# Patient Record
Sex: MALE | Race: WHITE | NOT HISPANIC OR LATINO | Employment: OTHER | URBAN - METROPOLITAN AREA
[De-identification: names, ages, dates, MRNs, and addresses within clinical notes are randomized per-mention and may not be internally consistent; named-entity substitution may affect disease eponyms.]

---

## 2022-06-27 RX ORDER — NEOMYCIN SULFATE 500 MG/1
TABLET ORAL
COMMUNITY
Start: 2021-12-14

## 2023-07-27 ENCOUNTER — HOSPITAL ENCOUNTER (OUTPATIENT)
Dept: DATA CONVERSION | Facility: HOSPITAL | Age: 78
Discharge: HOME | End: 2023-07-28

## 2023-07-27 DIAGNOSIS — G89.29 OTHER CHRONIC PAIN: Primary | ICD-10-CM

## 2023-07-27 DIAGNOSIS — M25.552 PAIN IN LEFT HIP: ICD-10-CM

## 2023-07-27 DIAGNOSIS — Z96.642 PRESENCE OF LEFT ARTIFICIAL HIP JOINT: ICD-10-CM

## 2023-07-27 DIAGNOSIS — Z88.2 ALLERGY STATUS TO SULFONAMIDES: ICD-10-CM

## 2023-07-27 LAB
ANION GAP SERPL CALCULATED.3IONS-SCNC: 7 MMOL/L (ref 0–19)
BASOPHILS # BLD AUTO: 0.02 K/UL (ref 0–0.22)
BASOPHILS NFR BLD AUTO: 0.3 % (ref 0–1)
BUN SERPL-MCNC: 17 MG/DL (ref 8–25)
BUN/CREAT SERPL: 18.9 RATIO (ref 8–21)
CALCIUM SERPL-MCNC: 9.3 MG/DL (ref 8.5–10.4)
CHLORIDE SERPL-SCNC: 105 MMOL/L (ref 97–107)
CO2 SERPL-SCNC: 30 MMOL/L (ref 24–31)
CREAT SERPL-MCNC: 0.9 MG/DL (ref 0.4–1.6)
DEPRECATED RDW RBC AUTO: 45.1 FL (ref 37–54)
DIFFERENTIAL METHOD BLD: ABNORMAL
EOSINOPHIL # BLD AUTO: 0.09 K/UL (ref 0–0.45)
EOSINOPHIL NFR BLD: 1.5 % (ref 0–3)
ERYTHROCYTE [DISTWIDTH] IN BLOOD BY AUTOMATED COUNT: 12.6 % (ref 11.7–15)
GFR SERPL CREATININE-BSD FRML MDRD: 87 ML/MIN/1.73 M2
GLUCOSE SERPL-MCNC: 94 MG/DL (ref 65–99)
HCT VFR BLD AUTO: 44 % (ref 41–50)
HGB BLD-MCNC: 14.8 GM/DL (ref 13.5–16.5)
IMM GRANULOCYTES # BLD AUTO: 0.02 K/UL (ref 0–0.1)
LYMPHOCYTES # BLD AUTO: 1.9 K/UL (ref 1.2–3.2)
LYMPHOCYTES NFR BLD MANUAL: 31.7 % (ref 20–40)
MCH RBC QN AUTO: 32.7 PG (ref 26–34)
MCHC RBC AUTO-ENTMCNC: 33.6 % (ref 31–37)
MCV RBC AUTO: 97.1 FL (ref 80–100)
MONOCYTES # BLD AUTO: 0.78 K/UL (ref 0–0.8)
MONOCYTES NFR BLD MANUAL: 13 % (ref 0–8)
NEUTROPHILS # BLD AUTO: 3.19 K/UL
NEUTROPHILS # BLD AUTO: 3.19 K/UL (ref 1.8–7.7)
NEUTROPHILS.IMMATURE NFR BLD: 0.3 % (ref 0–1)
NEUTS SEG NFR BLD: 53.2 % (ref 50–70)
NRBC BLD-RTO: 0 /100 WBC
PLATELET # BLD AUTO: 192 K/UL (ref 150–450)
PMV BLD AUTO: 9.2 CU (ref 7–12.6)
POTASSIUM SERPL-SCNC: 4.3 MMOL/L (ref 3.4–5.1)
RBC # BLD AUTO: 4.53 M/UL (ref 4.5–5.5)
SODIUM SERPL-SCNC: 142 MMOL/L (ref 133–145)
WBC # BLD AUTO: 6 K/UL (ref 4.5–11)

## 2024-05-12 ENCOUNTER — APPOINTMENT (OUTPATIENT)
Dept: RADIOLOGY | Facility: HOSPITAL | Age: 79
End: 2024-05-12
Payer: MEDICARE

## 2024-05-12 ENCOUNTER — APPOINTMENT (OUTPATIENT)
Dept: CARDIOLOGY | Facility: HOSPITAL | Age: 79
End: 2024-05-12
Payer: MEDICARE

## 2024-05-12 ENCOUNTER — HOSPITAL ENCOUNTER (EMERGENCY)
Facility: HOSPITAL | Age: 79
Discharge: HOME | End: 2024-05-12
Attending: EMERGENCY MEDICINE
Payer: MEDICARE

## 2024-05-12 VITALS
DIASTOLIC BLOOD PRESSURE: 101 MMHG | HEART RATE: 77 BPM | HEIGHT: 68 IN | SYSTOLIC BLOOD PRESSURE: 138 MMHG | OXYGEN SATURATION: 97 % | BODY MASS INDEX: 33.31 KG/M2 | TEMPERATURE: 97.6 F | RESPIRATION RATE: 18 BRPM | WEIGHT: 219.8 LBS

## 2024-05-12 DIAGNOSIS — R11.2 NAUSEA, VOMITING AND DIARRHEA: Primary | ICD-10-CM

## 2024-05-12 DIAGNOSIS — A08.4 VIRAL GASTROENTERITIS: ICD-10-CM

## 2024-05-12 DIAGNOSIS — R19.7 NAUSEA, VOMITING AND DIARRHEA: Primary | ICD-10-CM

## 2024-05-12 LAB
ALBUMIN SERPL-MCNC: 3.4 G/DL (ref 3.5–5)
ALP BLD-CCNC: 45 U/L (ref 35–125)
ALT SERPL-CCNC: 36 U/L (ref 5–40)
ANION GAP SERPL CALC-SCNC: 14 MMOL/L
APPEARANCE UR: CLEAR
AST SERPL-CCNC: 48 U/L (ref 5–40)
BACTERIA #/AREA URNS AUTO: ABNORMAL /HPF
BASOPHILS # BLD MANUAL: 0 X10*3/UL (ref 0–0.1)
BASOPHILS NFR BLD MANUAL: 0 %
BILIRUB SERPL-MCNC: 0.6 MG/DL (ref 0.1–1.2)
BILIRUB UR STRIP.AUTO-MCNC: NEGATIVE MG/DL
BUN SERPL-MCNC: 22 MG/DL (ref 8–25)
CALCIUM SERPL-MCNC: 8.5 MG/DL (ref 8.5–10.4)
CHLORIDE SERPL-SCNC: 102 MMOL/L (ref 97–107)
CO2 SERPL-SCNC: 23 MMOL/L (ref 24–31)
COLOR UR: YELLOW
CREAT SERPL-MCNC: 1.1 MG/DL (ref 0.4–1.6)
EGFRCR SERPLBLD CKD-EPI 2021: 68 ML/MIN/1.73M*2
EOSINOPHIL # BLD MANUAL: 0 X10*3/UL (ref 0–0.4)
EOSINOPHIL NFR BLD MANUAL: 0 %
ERYTHROCYTE [DISTWIDTH] IN BLOOD BY AUTOMATED COUNT: 12.9 % (ref 11.5–14.5)
FLUAV RNA RESP QL NAA+PROBE: NOT DETECTED
FLUBV RNA RESP QL NAA+PROBE: NOT DETECTED
GLUCOSE SERPL-MCNC: 138 MG/DL (ref 65–99)
GLUCOSE UR STRIP.AUTO-MCNC: NORMAL MG/DL
HCT VFR BLD AUTO: 45.1 % (ref 41–52)
HGB BLD-MCNC: 14.9 G/DL (ref 13.5–17.5)
IMM GRANULOCYTES # BLD AUTO: 0.02 X10*3/UL (ref 0–0.5)
IMM GRANULOCYTES NFR BLD AUTO: 0.6 % (ref 0–0.9)
KETONES UR STRIP.AUTO-MCNC: ABNORMAL MG/DL
LEUKOCYTE ESTERASE UR QL STRIP.AUTO: NEGATIVE
LIPASE SERPL-CCNC: <16 U/L (ref 16–63)
LYMPHOCYTES # BLD MANUAL: 1.19 X10*3/UL (ref 0.8–3)
LYMPHOCYTES NFR BLD MANUAL: 34 %
MCH RBC QN AUTO: 31.6 PG (ref 26–34)
MCHC RBC AUTO-ENTMCNC: 33 G/DL (ref 32–36)
MCV RBC AUTO: 96 FL (ref 80–100)
METAMYELOCYTES # BLD MANUAL: 0.07 X10*3/UL
METAMYELOCYTES NFR BLD MANUAL: 2 %
MONOCYTES # BLD MANUAL: 0.39 X10*3/UL (ref 0.05–0.8)
MONOCYTES NFR BLD MANUAL: 11 %
MUCOUS THREADS #/AREA URNS AUTO: ABNORMAL /LPF
NEUTROPHILS # BLD MANUAL: 1.76 X10*3/UL (ref 1.6–5.5)
NEUTS BAND # BLD MANUAL: 1.44 X10*3/UL (ref 0–0.5)
NEUTS BAND NFR BLD MANUAL: 41 %
NEUTS SEG # BLD MANUAL: 0.32 X10*3/UL (ref 1.6–5)
NEUTS SEG NFR BLD MANUAL: 9 %
NEUTS VAC BLD QL SMEAR: PRESENT
NITRITE UR QL STRIP.AUTO: NEGATIVE
NRBC BLD-RTO: 0 /100 WBCS (ref 0–0)
PH UR STRIP.AUTO: 6.5 [PH]
PLATELET # BLD AUTO: 202 X10*3/UL (ref 150–450)
POTASSIUM SERPL-SCNC: 3.7 MMOL/L (ref 3.4–5.1)
PROT SERPL-MCNC: 6.5 G/DL (ref 5.9–7.9)
PROT UR STRIP.AUTO-MCNC: ABNORMAL MG/DL
RBC # BLD AUTO: 4.72 X10*6/UL (ref 4.5–5.9)
RBC # UR STRIP.AUTO: ABNORMAL /UL
RBC #/AREA URNS AUTO: >20 /HPF
RBC MORPH BLD: ABNORMAL
SARS-COV-2 RNA RESP QL NAA+PROBE: NOT DETECTED
SODIUM SERPL-SCNC: 139 MMOL/L (ref 133–145)
SP GR UR STRIP.AUTO: >1.05
SQUAMOUS #/AREA URNS AUTO: ABNORMAL /HPF
TOTAL CELLS COUNTED BLD: 100
TROPONIN T SERPL-MCNC: 25 NG/L
TROPONIN T SERPL-MCNC: 26 NG/L
UROBILINOGEN UR STRIP.AUTO-MCNC: NORMAL MG/DL
VARIANT LYMPHS # BLD MANUAL: 0.11 X10*3/UL (ref 0–0.3)
VARIANT LYMPHS NFR BLD: 3 %
WBC # BLD AUTO: 3.5 X10*3/UL (ref 4.4–11.3)
WBC #/AREA URNS AUTO: ABNORMAL /HPF

## 2024-05-12 PROCEDURE — 2550000001 HC RX 255 CONTRASTS: Performed by: EMERGENCY MEDICINE

## 2024-05-12 PROCEDURE — 87636 SARSCOV2 & INF A&B AMP PRB: CPT | Performed by: PHYSICIAN ASSISTANT

## 2024-05-12 PROCEDURE — 96375 TX/PRO/DX INJ NEW DRUG ADDON: CPT

## 2024-05-12 PROCEDURE — 96374 THER/PROPH/DIAG INJ IV PUSH: CPT

## 2024-05-12 PROCEDURE — 2500000004 HC RX 250 GENERAL PHARMACY W/ HCPCS (ALT 636 FOR OP/ED): Performed by: PHYSICIAN ASSISTANT

## 2024-05-12 PROCEDURE — 80053 COMPREHEN METABOLIC PANEL: CPT | Performed by: PHYSICIAN ASSISTANT

## 2024-05-12 PROCEDURE — 74177 CT ABD & PELVIS W/CONTRAST: CPT | Performed by: RADIOLOGY

## 2024-05-12 PROCEDURE — 93005 ELECTROCARDIOGRAM TRACING: CPT

## 2024-05-12 PROCEDURE — 83690 ASSAY OF LIPASE: CPT | Performed by: PHYSICIAN ASSISTANT

## 2024-05-12 PROCEDURE — 81001 URINALYSIS AUTO W/SCOPE: CPT | Performed by: PHYSICIAN ASSISTANT

## 2024-05-12 PROCEDURE — 85007 BL SMEAR W/DIFF WBC COUNT: CPT | Performed by: PHYSICIAN ASSISTANT

## 2024-05-12 PROCEDURE — 85027 COMPLETE CBC AUTOMATED: CPT | Performed by: PHYSICIAN ASSISTANT

## 2024-05-12 PROCEDURE — 36415 COLL VENOUS BLD VENIPUNCTURE: CPT | Performed by: PHYSICIAN ASSISTANT

## 2024-05-12 PROCEDURE — 87086 URINE CULTURE/COLONY COUNT: CPT | Mod: TRILAB,WESLAB | Performed by: PHYSICIAN ASSISTANT

## 2024-05-12 PROCEDURE — 74177 CT ABD & PELVIS W/CONTRAST: CPT

## 2024-05-12 PROCEDURE — 84484 ASSAY OF TROPONIN QUANT: CPT | Performed by: PHYSICIAN ASSISTANT

## 2024-05-12 PROCEDURE — 71046 X-RAY EXAM CHEST 2 VIEWS: CPT

## 2024-05-12 PROCEDURE — 99285 EMERGENCY DEPT VISIT HI MDM: CPT | Mod: 25

## 2024-05-12 PROCEDURE — 71046 X-RAY EXAM CHEST 2 VIEWS: CPT | Performed by: RADIOLOGY

## 2024-05-12 RX ORDER — ONDANSETRON 4 MG/1
4 TABLET, FILM COATED ORAL EVERY 6 HOURS
Qty: 12 TABLET | Refills: 0 | Status: SHIPPED | OUTPATIENT
Start: 2024-05-12 | End: 2024-05-21 | Stop reason: HOSPADM

## 2024-05-12 RX ORDER — KETOROLAC TROMETHAMINE 30 MG/ML
15 INJECTION, SOLUTION INTRAMUSCULAR; INTRAVENOUS ONCE
Status: COMPLETED | OUTPATIENT
Start: 2024-05-12 | End: 2024-05-12

## 2024-05-12 RX ORDER — FAMOTIDINE 10 MG/ML
20 INJECTION INTRAVENOUS ONCE
Status: COMPLETED | OUTPATIENT
Start: 2024-05-12 | End: 2024-05-12

## 2024-05-12 RX ORDER — ACETAMINOPHEN 325 MG/1
975 TABLET ORAL ONCE
Status: COMPLETED | OUTPATIENT
Start: 2024-05-12 | End: 2024-05-12

## 2024-05-12 RX ORDER — ACETAMINOPHEN 325 MG/1
650 TABLET ORAL EVERY 6 HOURS PRN
Qty: 30 TABLET | Refills: 0 | Status: SHIPPED | OUTPATIENT
Start: 2024-05-12 | End: 2024-05-22

## 2024-05-12 RX ORDER — FAMOTIDINE 20 MG/1
20 TABLET, FILM COATED ORAL DAILY
Qty: 15 TABLET | Refills: 0 | Status: SHIPPED | OUTPATIENT
Start: 2024-05-12 | End: 2024-06-10 | Stop reason: HOSPADM

## 2024-05-12 RX ORDER — ONDANSETRON HYDROCHLORIDE 2 MG/ML
4 INJECTION, SOLUTION INTRAVENOUS ONCE
Status: COMPLETED | OUTPATIENT
Start: 2024-05-12 | End: 2024-05-12

## 2024-05-12 RX ADMIN — IOHEXOL 75 ML: 350 INJECTION, SOLUTION INTRAVENOUS at 14:02

## 2024-05-12 RX ADMIN — FAMOTIDINE 20 MG: 10 INJECTION, SOLUTION INTRAVENOUS at 13:43

## 2024-05-12 RX ADMIN — ONDANSETRON 4 MG: 2 INJECTION INTRAMUSCULAR; INTRAVENOUS at 13:17

## 2024-05-12 RX ADMIN — SODIUM CHLORIDE 500 ML: 900 INJECTION, SOLUTION INTRAVENOUS at 13:18

## 2024-05-12 RX ADMIN — ACETAMINOPHEN 975 MG: 325 TABLET ORAL at 15:35

## 2024-05-12 RX ADMIN — KETOROLAC TROMETHAMINE 15 MG: 30 INJECTION, SOLUTION INTRAMUSCULAR at 13:17

## 2024-05-12 ASSESSMENT — PAIN DESCRIPTION - PAIN TYPE: TYPE: ACUTE PAIN

## 2024-05-12 ASSESSMENT — PAIN SCALES - GENERAL
PAINLEVEL_OUTOF10: 7
PAINLEVEL_OUTOF10: 0 - NO PAIN
PAINLEVEL_OUTOF10: 7
PAINLEVEL_OUTOF10: 7
PAINLEVEL_OUTOF10: 0 - NO PAIN

## 2024-05-12 ASSESSMENT — PAIN - FUNCTIONAL ASSESSMENT
PAIN_FUNCTIONAL_ASSESSMENT: 0-10

## 2024-05-12 ASSESSMENT — COLUMBIA-SUICIDE SEVERITY RATING SCALE - C-SSRS
2. HAVE YOU ACTUALLY HAD ANY THOUGHTS OF KILLING YOURSELF?: NO
6. HAVE YOU EVER DONE ANYTHING, STARTED TO DO ANYTHING, OR PREPARED TO DO ANYTHING TO END YOUR LIFE?: NO
1. IN THE PAST MONTH, HAVE YOU WISHED YOU WERE DEAD OR WISHED YOU COULD GO TO SLEEP AND NOT WAKE UP?: NO

## 2024-05-12 ASSESSMENT — PAIN DESCRIPTION - DESCRIPTORS: DESCRIPTORS: SQUEEZING

## 2024-05-12 ASSESSMENT — PAIN DESCRIPTION - LOCATION
LOCATION: ABDOMEN
LOCATION: ABDOMEN

## 2024-05-12 NOTE — ED PROVIDER NOTES
HPI   No chief complaint on file.      79-year-old male presented emergency department with a chief complaint of 1 week of nausea, vomiting, diarrhea.  Has mild  abdominal discomfort.  Does consume alcohol regularly.  Denies chest pain, shortness of breath numbness weakness.  Well-appearing nontoxic afebrile.  Denies fall or injury or trauma.  Denies recent travel or antibiotic use.  No other complaint.                          No data recorded                   Patient History   No past medical history on file.  No past surgical history on file.  No family history on file.  Social History     Tobacco Use    Smoking status: Not on file    Smokeless tobacco: Not on file   Substance Use Topics    Alcohol use: Not on file    Drug use: Not on file       Physical Exam   ED Triage Vitals [05/12/24 1304]   Temperature Heart Rate Respirations BP   36.4 °C (97.6 °F) 65 20 128/55      Pulse Ox Temp Source Heart Rate Source Patient Position   94 % Temporal Monitor Sitting      BP Location FiO2 (%)     Left arm --       Physical Exam  Vitals and nursing note reviewed.   Constitutional:       Appearance: Normal appearance.   HENT:      Head: Normocephalic.      Nose: Nose normal.      Mouth/Throat:      Mouth: Mucous membranes are moist.   Cardiovascular:      Rate and Rhythm: Normal rate and regular rhythm.   Pulmonary:      Effort: Pulmonary effort is normal.   Abdominal:      Palpations: Abdomen is soft.   Musculoskeletal:         General: Normal range of motion.      Cervical back: Normal range of motion.   Skin:     General: Skin is warm.   Neurological:      General: No focal deficit present.      Mental Status: He is alert and oriented to person, place, and time.   Psychiatric:         Mood and Affect: Mood normal.         ED Course & MDM   ED Course as of 05/12/24 2202   Sun May 12, 2024   1429 EKG on my independent interpretation: Sinus rhythm 81 bpm, normal axis, wide QRS at 1 and 58 ms with right bundle branch block  morphology, no clear acute ST or T wave abnormalities [TRAMAINE]   1500 Bands %, Manual: 41.0 []   1515 Bands %, Manual: 41.0 []      ED Course User Index  [] Burke Brice PA-C  [] June Morales MD         Diagnoses as of 05/12/24 2202   Nausea, vomiting and diarrhea   Viral gastroenteritis       Medical Decision Making  I have seen and evaluated this patient.  The attending physician has also seen and evaluated this patient.  Vital signs, laboratory testing and diagnostic images if applicable have been reviewed.  All laboratory and imaging is interpreted by myself unless otherwise stated.  Radiology studies are also formally interpreted by radiologist.    Patient is mildly leukopenic.  Not significantly anemic.  Metabolic panel without zaid renal impairment or significant electrolyte abnormality.  Patient is a 41% bandemia.  Believe this is viral in nature.  Negative CT abdomen.  Negative chest x-ray.  Overall believe patient represents a viral syndrome.  He has improvement in emergency department.  Feels comfortable plan of discharge.  Released in good condition.    Labs Reviewed  CBC WITH AUTO DIFFERENTIAL - Abnormal     WBC                           3.5 (*)                nRBC                          0.0                    RBC                           4.72                   Hemoglobin                    14.9                   Hematocrit                    45.1                   MCV                           96                     MCH                           31.6                   MCHC                          33.0                   RDW                           12.9                   Platelets                     202                    Immature Granulocytes %, Automated   0.6                    Immature Granulocytes Absolute, Au*   0.02                     Narrative: The previously reported component Neutrophils % is no longer being reported.The previously reported component Lymphocytes % is no  longer being reported.The previously reported component Monocytes % is no longer being reported.The previously                 reported component Eosinophils % is no longer being reported.The previously reported component Basophils % is no longer being reported.The previously reported component Absolute Neutrophils is no longer being reported.The previously reported                 component Absolute Lymphocytes is no longer being reported.The previously reported component Absolute Monocytes is no longer being reported.The previously reported component Absolute Eosinophils is no longer being reported.The previously reported                 component Absolute Basophils is no longer being reported.  COMPREHENSIVE METABOLIC PANEL - Abnormal     Glucose                       138 (*)                Sodium                        139                    Potassium                     3.7                    Chloride                      102                    Bicarbonate                   23 (*)                 Urea Nitrogen                 22                     Creatinine                    1.10                   eGFR                          68                     Calcium                       8.5                    Albumin                       3.4 (*)                Alkaline Phosphatase          45                     Total Protein                 6.5                    AST                           48 (*)                 Bilirubin, Total              0.6                    ALT                           36                     Anion Gap                     14                  LIPASE - Abnormal     Lipase                        <16 (*)             URINALYSIS WITH REFLEX CULTURE AND MICROSCOPIC - Abnormal     Color, Urine                  Yellow                 Appearance, Urine             Clear                  Specific Gravity, Urine       >1.050 (*)               pH, Urine                     6.5                     Protein, Urine                100 (2+) (*)               Glucose, Urine                Normal                 Blood, Urine                    (*)                  Ketones, Urine                10 (1+) (*)               Bilirubin, Urine              NEGATIVE                Urobilinogen, Urine           Normal                 Nitrite, Urine                NEGATIVE                Leukocyte Esterase, Urine     NEGATIVE             SERIAL TROPONIN, INITIAL ([facility]) - Abnormal     Troponin T, High Sensitivity   26 (*)              SERIAL TROPONIN, INITIAL ([facility]) - Abnormal     Troponin T, High Sensitivity   25 (*)              MANUAL DIFFERENTIAL - Abnormal     Neutrophils %, Manual         9.0                    Bands %, Manual               41.0                   Lymphocytes %, Manual         34.0                   Monocytes %, Manual           11.0                   Eosinophils %, Manual         0.0                    Basophils %, Manual           0.0                    Atypical Lymphocytes %, Manual   3.0                    Metamyelocytes %, Manual      2.0                    Seg Neutrophils Absolute, Manual   0.32 (*)               Bands Absolute, Manual        1.44 (*)               Lymphocytes Absolute, Manual   1.19                   Monocytes Absolute, Manual    0.39                   Eosinophils Absolute, Manual   0.00                   Basophils Absolute, Manual    0.00                   Atypical Lymphs Absolute, Manual   0.11                   Metamyelocytes Absolute, Manual   0.07                   Total Cells Counted           100                    Neutrophils Absolute, Manual   1.76                   RBC Morphology                See Below                Vacuolated Neutrophils        Present             URINALYSIS MICROSCOPIC WITH REFLEX CULTURE - Abnormal     WBC, Urine                    6-10 (*)               RBC, Urine                    >20 (*)                Squamous Epithelial Cells, Urine                           Bacteria, Urine               1+ (*)                 Mucus, Urine                  2+                  SARS-COV-2 PCR - Normal     Coronavirus 2019, PCR                                  Narrative: This assay has received FDA Emergency Use Authorization (EUA) and is only authorized for the duration of time that circumstances exist to justify the authorization of the emergency use of in vitro diagnostic tests for the detection of SARS-CoV-2 virus and/or diagnosis of COVID-19 infection under section 564(b)(1) of the Act, 21 U.S.C. 360bbb-3(b)(1). This assay is an in vitro diagnostic nucleic acid amplification test for the qualitative detection of SARS-CoV-2 from nasopharyngeal specimens and has been validated for use at [facility]. Negative results do not preclude COVID-19 infections and should not be used as the sole basis for diagnosis, treatment, or other management decisions.                  INFLUENZA A AND B PCR - Normal     Flu A Result                                         Flu B Result                                           Narrative: This assay is an in vitro diagnostic multiplex nucleic acid amplification test for the detection and discrimination of Influenza A & B from nasopharyngeal specimens, and has been validated for use at [facility]. Negative results do not preclude Influenza A/B infections, and should not be used as the sole basis for diagnosis, treatment, or other management decisions. If Influenza A/B and RSV PCR results are negative, testing for Parainfluenza virus, Adenovirus and Metapneumovirus is routinely performed for [facility] [place] [place] [facility] in[place], and is available on other patients by placing an add-on request.  URINE CULTURE  URINALYSIS WITH REFLEX CULTURE AND MICROSCOPIC       Narrative: The following orders were created for panel order Urinalysis with Reflex Culture and Microscopic.                 Procedure                               Abnormality         Status                                   ---------                               -----------         ------                                   Urinalysis with Reflex C...[[id number]]  Abnormal            Final result                             Extra Urine Gray Tube[[id number]]                                                                                     Please view results for these tests on the individual orders.  TROPONIN T SERIES, HIGH SENSITIVITY (0, 2 HR, 6 HR)       Narrative: The following orders were created for panel order Troponin T Series, High Sensitivity (0, 2HR, 6HR).                Procedure                               Abnormality         Status                                   ---------                               -----------         ------                                   Serial Troponin, Initial...[[id number]]  Abnormal            Final result                                             Please view results for these tests on the individual orders.  TROPONIN T SERIES, HIGH SENSITIVITY (0, 2 HR, 6 HR)       Narrative: The following orders were created for panel order Troponin T Series, High Sensitivity (0, 2HR, 6HR).                Procedure                               Abnormality         Status                                   ---------                               -----------         ------                                   Serial Troponin, Initial...[[id number]]  Abnormal            Final result                             Serial Troponin, 2 Hour ...[[id number]]                                                                               Please view results for these tests on the individual orders.  SERIAL TROPONIN,  2 HOUR ([facility])  XR chest 2 views   Final Result    No acute cardiopulmonary disease.          Signed by: Hollie Stout 5/12/2024 2:39 PM    Dictation workstation:   [id number]     CT abdomen pelvis w IV contrast    Final Result    Diffuse fatty infiltration of the liver.          Colonic diverticulosis without acute diverticulitis.          Mildly dilated proximal small bowel segments which are predominantly    fluid-filled. Gradual transition to less dilated small bowel without    obstruction. Findings may reflect a nonspecific enteritis.          Left renal lower pole 6 mm nonobstructing calculus. No hydronephrosis    bilaterally.                      MACRO:    None.          Signed by: Rocco Hutchins 5/12/2024 2:19 PM    Dictation workstation:   [id number]     Medications  ketorolac (Toradol) injection 15 mg (15 mg intravenous Given 5/12/24 1317)  ondansetron (Zofran) injection 4 mg (4 mg intravenous Given 5/12/24 1317)  sodium chloride 0.9 % bolus 500 mL (0 mL intravenous Stopped 5/12/24 1348)  famotidine PF (Pepcid) injection 20 mg (20 mg intravenous Given 5/12/24 1343)  iohexol (OMNIPaque) 350 mg iodine/mL solution 75 mL (75 mL intravenous Given 5/12/24 1402)  acetaminophen (Tylenol) tablet 975 mg (975 mg oral Given 5/12/24 1535)  Discharge Medication List as of 5/12/2024  4:49 PM    START taking these medications    acetaminophen (Tylenol) 325 mg tablet  Take 2 tablets (650 mg) by mouth every 6 hours if needed for mild pain (1 - 3) for up to 10 days., Starting Sun 5/12/2024, Until Wed 5/22/2024 at 2359, Print    famotidine (Pepcid) 20 mg tablet  Take 1 tablet (20 mg) by mouth once daily for 15 days., Starting Sun 5/12/2024, Until Mon 5/27/2024, Print    ondansetron (Zofran) 4 mg tablet  Take 1 tablet (4 mg) by mouth every 6 hours for 3 days., Starting Sun 5/12/2024, Until Wed 5/15/2024, Print                  Procedure  Procedures     Burke Brice PA-C  05/12/24 7669

## 2024-05-14 LAB
ATRIAL RATE: 81 BPM
BACTERIA UR CULT: NORMAL
P AXIS: 59 DEGREES
P OFFSET: 199 MS
P ONSET: 149 MS
PR INTERVAL: 148 MS
Q ONSET: 223 MS
QRS COUNT: 13 BEATS
QRS DURATION: 158 MS
QT INTERVAL: 422 MS
QTC CALCULATION(BAZETT): 490 MS
QTC FREDERICIA: 466 MS
R AXIS: 220 DEGREES
T AXIS: 28 DEGREES
T OFFSET: 434 MS
VENTRICULAR RATE: 81 BPM

## 2024-05-17 ENCOUNTER — APPOINTMENT (OUTPATIENT)
Dept: CARDIOLOGY | Facility: HOSPITAL | Age: 79
DRG: 417 | End: 2024-05-17
Payer: MEDICARE

## 2024-05-17 ENCOUNTER — HOSPITAL ENCOUNTER (EMERGENCY)
Facility: HOSPITAL | Age: 79
Discharge: OTHER INSTITUTION | DRG: 417 | End: 2024-05-18
Attending: STUDENT IN AN ORGANIZED HEALTH CARE EDUCATION/TRAINING PROGRAM
Payer: MEDICARE

## 2024-05-17 ENCOUNTER — APPOINTMENT (OUTPATIENT)
Dept: RADIOLOGY | Facility: HOSPITAL | Age: 79
DRG: 417 | End: 2024-05-17
Payer: MEDICARE

## 2024-05-17 DIAGNOSIS — R10.84 ABDOMINAL PAIN, GENERALIZED: Primary | ICD-10-CM

## 2024-05-17 DIAGNOSIS — K57.92 DIVERTICULITIS: ICD-10-CM

## 2024-05-17 LAB
ALBUMIN SERPL-MCNC: 2.6 G/DL (ref 3.5–5)
ALP BLD-CCNC: 82 U/L (ref 35–125)
ALT SERPL-CCNC: 27 U/L (ref 5–40)
ANION GAP SERPL CALC-SCNC: 14 MMOL/L
AST SERPL-CCNC: 23 U/L (ref 5–40)
BILIRUB SERPL-MCNC: 0.7 MG/DL (ref 0.1–1.2)
BUN SERPL-MCNC: 16 MG/DL (ref 8–25)
CALCIUM SERPL-MCNC: 8.3 MG/DL (ref 8.5–10.4)
CHLORIDE SERPL-SCNC: 97 MMOL/L (ref 97–107)
CO2 SERPL-SCNC: 23 MMOL/L (ref 24–31)
CREAT SERPL-MCNC: 0.8 MG/DL (ref 0.4–1.6)
EGFRCR SERPLBLD CKD-EPI 2021: 90 ML/MIN/1.73M*2
GLUCOSE SERPL-MCNC: 142 MG/DL (ref 65–99)
LIPASE SERPL-CCNC: 31 U/L (ref 16–63)
POTASSIUM SERPL-SCNC: 3.7 MMOL/L (ref 3.4–5.1)
PROT SERPL-MCNC: 5.8 G/DL (ref 5.9–7.9)
SODIUM SERPL-SCNC: 134 MMOL/L (ref 133–145)

## 2024-05-17 PROCEDURE — 99285 EMERGENCY DEPT VISIT HI MDM: CPT | Mod: 25

## 2024-05-17 PROCEDURE — 2500000004 HC RX 250 GENERAL PHARMACY W/ HCPCS (ALT 636 FOR OP/ED): Performed by: STUDENT IN AN ORGANIZED HEALTH CARE EDUCATION/TRAINING PROGRAM

## 2024-05-17 PROCEDURE — 80053 COMPREHEN METABOLIC PANEL: CPT | Performed by: STUDENT IN AN ORGANIZED HEALTH CARE EDUCATION/TRAINING PROGRAM

## 2024-05-17 PROCEDURE — 83690 ASSAY OF LIPASE: CPT | Performed by: STUDENT IN AN ORGANIZED HEALTH CARE EDUCATION/TRAINING PROGRAM

## 2024-05-17 PROCEDURE — 74177 CT ABD & PELVIS W/CONTRAST: CPT

## 2024-05-17 PROCEDURE — 2550000001 HC RX 255 CONTRASTS: Performed by: STUDENT IN AN ORGANIZED HEALTH CARE EDUCATION/TRAINING PROGRAM

## 2024-05-17 PROCEDURE — 96375 TX/PRO/DX INJ NEW DRUG ADDON: CPT

## 2024-05-17 PROCEDURE — 74177 CT ABD & PELVIS W/CONTRAST: CPT | Performed by: RADIOLOGY

## 2024-05-17 PROCEDURE — 85007 BL SMEAR W/DIFF WBC COUNT: CPT | Performed by: STUDENT IN AN ORGANIZED HEALTH CARE EDUCATION/TRAINING PROGRAM

## 2024-05-17 PROCEDURE — 85027 COMPLETE CBC AUTOMATED: CPT | Performed by: STUDENT IN AN ORGANIZED HEALTH CARE EDUCATION/TRAINING PROGRAM

## 2024-05-17 PROCEDURE — 36415 COLL VENOUS BLD VENIPUNCTURE: CPT | Performed by: STUDENT IN AN ORGANIZED HEALTH CARE EDUCATION/TRAINING PROGRAM

## 2024-05-17 PROCEDURE — 93005 ELECTROCARDIOGRAM TRACING: CPT

## 2024-05-17 RX ORDER — FAMOTIDINE 10 MG/ML
40 INJECTION INTRAVENOUS ONCE
Status: COMPLETED | OUTPATIENT
Start: 2024-05-17 | End: 2024-05-17

## 2024-05-17 RX ORDER — ONDANSETRON HYDROCHLORIDE 2 MG/ML
4 INJECTION, SOLUTION INTRAVENOUS ONCE
Status: COMPLETED | OUTPATIENT
Start: 2024-05-17 | End: 2024-05-17

## 2024-05-17 RX ORDER — SODIUM CHLORIDE 9 MG/ML
100 INJECTION, SOLUTION INTRAVENOUS CONTINUOUS
Status: CANCELLED | OUTPATIENT
Start: 2024-05-17

## 2024-05-17 RX ADMIN — IOHEXOL 75 ML: 350 INJECTION, SOLUTION INTRAVENOUS at 22:55

## 2024-05-17 RX ADMIN — ONDANSETRON 4 MG: 2 INJECTION INTRAMUSCULAR; INTRAVENOUS at 22:25

## 2024-05-17 RX ADMIN — FAMOTIDINE 40 MG: 10 INJECTION, SOLUTION INTRAVENOUS at 22:24

## 2024-05-17 ASSESSMENT — PAIN DESCRIPTION - FREQUENCY: FREQUENCY: CONSTANT/CONTINUOUS

## 2024-05-17 ASSESSMENT — PAIN - FUNCTIONAL ASSESSMENT: PAIN_FUNCTIONAL_ASSESSMENT: 0-10

## 2024-05-17 ASSESSMENT — PAIN SCALES - GENERAL: PAINLEVEL_OUTOF10: 7

## 2024-05-17 ASSESSMENT — PAIN DESCRIPTION - DESCRIPTORS: DESCRIPTORS: CRAMPING

## 2024-05-17 ASSESSMENT — PAIN DESCRIPTION - LOCATION: LOCATION: ABDOMEN

## 2024-05-17 ASSESSMENT — PAIN DESCRIPTION - ORIENTATION: ORIENTATION: LEFT

## 2024-05-18 ENCOUNTER — APPOINTMENT (OUTPATIENT)
Dept: RADIOLOGY | Facility: HOSPITAL | Age: 79
DRG: 417 | End: 2024-05-18
Payer: MEDICARE

## 2024-05-18 ENCOUNTER — HOSPITAL ENCOUNTER (INPATIENT)
Facility: HOSPITAL | Age: 79
LOS: 3 days | Discharge: NURSING, REHAB OR LONG-TERM CARE | DRG: 417 | End: 2024-05-21
Attending: STUDENT IN AN ORGANIZED HEALTH CARE EDUCATION/TRAINING PROGRAM | Admitting: STUDENT IN AN ORGANIZED HEALTH CARE EDUCATION/TRAINING PROGRAM
Payer: MEDICARE

## 2024-05-18 VITALS
TEMPERATURE: 98.4 F | WEIGHT: 240.3 LBS | SYSTOLIC BLOOD PRESSURE: 143 MMHG | RESPIRATION RATE: 15 BRPM | HEART RATE: 85 BPM | HEIGHT: 68 IN | BODY MASS INDEX: 36.42 KG/M2 | OXYGEN SATURATION: 95 % | DIASTOLIC BLOOD PRESSURE: 78 MMHG

## 2024-05-18 DIAGNOSIS — K81.0 ACUTE CHOLECYSTITIS: Primary | ICD-10-CM

## 2024-05-18 DIAGNOSIS — I50.43 CHF (CONGESTIVE HEART FAILURE), NYHA CLASS I, ACUTE ON CHRONIC, COMBINED (MULTI): ICD-10-CM

## 2024-05-18 DIAGNOSIS — I50.9 CHRONIC CONGESTIVE HEART FAILURE, UNSPECIFIED HEART FAILURE TYPE (MULTI): ICD-10-CM

## 2024-05-18 LAB
APPEARANCE UR: CLEAR
BACTERIA #/AREA URNS AUTO: ABNORMAL /HPF
BASOPHILS # BLD MANUAL: 0 X10*3/UL (ref 0–0.1)
BASOPHILS NFR BLD MANUAL: 0 %
BILIRUB UR STRIP.AUTO-MCNC: NEGATIVE MG/DL
COLOR UR: YELLOW
EOSINOPHIL # BLD MANUAL: 0 X10*3/UL (ref 0–0.4)
EOSINOPHIL NFR BLD MANUAL: 0 %
ERYTHROCYTE [DISTWIDTH] IN BLOOD BY AUTOMATED COUNT: 12.7 % (ref 11.5–14.5)
GLUCOSE UR STRIP.AUTO-MCNC: NORMAL MG/DL
HCT VFR BLD AUTO: 43.6 % (ref 41–52)
HGB BLD-MCNC: 14.7 G/DL (ref 13.5–17.5)
IMM GRANULOCYTES # BLD AUTO: 0.1 X10*3/UL (ref 0–0.5)
IMM GRANULOCYTES NFR BLD AUTO: 1.5 % (ref 0–0.9)
KETONES UR STRIP.AUTO-MCNC: ABNORMAL MG/DL
LEUKOCYTE ESTERASE UR QL STRIP.AUTO: NEGATIVE
LYMPHOCYTES # BLD MANUAL: 0.61 X10*3/UL (ref 0.8–3)
LYMPHOCYTES NFR BLD MANUAL: 9 %
MCH RBC QN AUTO: 31.7 PG (ref 26–34)
MCHC RBC AUTO-ENTMCNC: 33.7 G/DL (ref 32–36)
MCV RBC AUTO: 94 FL (ref 80–100)
MONOCYTES # BLD MANUAL: 0.82 X10*3/UL (ref 0.05–0.8)
MONOCYTES NFR BLD MANUAL: 12 %
MUCOUS THREADS #/AREA URNS AUTO: ABNORMAL /LPF
NEUTROPHILS # BLD MANUAL: 5.37 X10*3/UL (ref 1.6–5.5)
NEUTS BAND # BLD MANUAL: 2.11 X10*3/UL (ref 0–0.5)
NEUTS BAND NFR BLD MANUAL: 31 %
NEUTS SEG # BLD MANUAL: 3.26 X10*3/UL (ref 1.6–5)
NEUTS SEG NFR BLD MANUAL: 48 %
NITRITE UR QL STRIP.AUTO: NEGATIVE
NRBC BLD-RTO: 0 /100 WBCS (ref 0–0)
PH UR STRIP.AUTO: 6.5 [PH]
PLATELET # BLD AUTO: 345 X10*3/UL (ref 150–450)
PROT UR STRIP.AUTO-MCNC: ABNORMAL MG/DL
RBC # BLD AUTO: 4.63 X10*6/UL (ref 4.5–5.9)
RBC # UR STRIP.AUTO: NEGATIVE /UL
RBC #/AREA URNS AUTO: ABNORMAL /HPF
RBC MORPH BLD: ABNORMAL
SP GR UR STRIP.AUTO: >1.05
TOTAL CELLS COUNTED BLD: 100
UROBILINOGEN UR STRIP.AUTO-MCNC: ABNORMAL MG/DL
WBC # BLD AUTO: 6.8 X10*3/UL (ref 4.4–11.3)
WBC #/AREA URNS AUTO: ABNORMAL /HPF

## 2024-05-18 PROCEDURE — A9537 TC99M MEBROFENIN: HCPCS | Performed by: STUDENT IN AN ORGANIZED HEALTH CARE EDUCATION/TRAINING PROGRAM

## 2024-05-18 PROCEDURE — 2500000004 HC RX 250 GENERAL PHARMACY W/ HCPCS (ALT 636 FOR OP/ED): Performed by: STUDENT IN AN ORGANIZED HEALTH CARE EDUCATION/TRAINING PROGRAM

## 2024-05-18 PROCEDURE — 78227 HEPATOBIL SYST IMAGE W/DRUG: CPT

## 2024-05-18 PROCEDURE — 81003 URINALYSIS AUTO W/O SCOPE: CPT | Performed by: STUDENT IN AN ORGANIZED HEALTH CARE EDUCATION/TRAINING PROGRAM

## 2024-05-18 PROCEDURE — 96376 TX/PRO/DX INJ SAME DRUG ADON: CPT

## 2024-05-18 PROCEDURE — 1100000001 HC PRIVATE ROOM DAILY

## 2024-05-18 PROCEDURE — 2500000001 HC RX 250 WO HCPCS SELF ADMINISTERED DRUGS (ALT 637 FOR MEDICARE OP): Performed by: STUDENT IN AN ORGANIZED HEALTH CARE EDUCATION/TRAINING PROGRAM

## 2024-05-18 PROCEDURE — 2500000006 HC RX 250 W HCPCS SELF ADMINISTERED DRUGS (ALT 637 FOR ALL PAYERS): Mod: MUE | Performed by: STUDENT IN AN ORGANIZED HEALTH CARE EDUCATION/TRAINING PROGRAM

## 2024-05-18 PROCEDURE — 76705 ECHO EXAM OF ABDOMEN: CPT | Performed by: STUDENT IN AN ORGANIZED HEALTH CARE EDUCATION/TRAINING PROGRAM

## 2024-05-18 PROCEDURE — 76705 ECHO EXAM OF ABDOMEN: CPT

## 2024-05-18 PROCEDURE — 96365 THER/PROPH/DIAG IV INF INIT: CPT

## 2024-05-18 PROCEDURE — 99222 1ST HOSP IP/OBS MODERATE 55: CPT | Performed by: STUDENT IN AN ORGANIZED HEALTH CARE EDUCATION/TRAINING PROGRAM

## 2024-05-18 PROCEDURE — 3430000001 HC RX 343 DIAGNOSTIC RADIOPHARMACEUTICALS: Performed by: STUDENT IN AN ORGANIZED HEALTH CARE EDUCATION/TRAINING PROGRAM

## 2024-05-18 PROCEDURE — 78226 HEPATOBILIARY SYSTEM IMAGING: CPT | Performed by: NUCLEAR MEDICINE

## 2024-05-18 PROCEDURE — 96375 TX/PRO/DX INJ NEW DRUG ADDON: CPT

## 2024-05-18 PROCEDURE — 96361 HYDRATE IV INFUSION ADD-ON: CPT

## 2024-05-18 RX ORDER — SODIUM CHLORIDE, SODIUM LACTATE, POTASSIUM CHLORIDE, CALCIUM CHLORIDE 600; 310; 30; 20 MG/100ML; MG/100ML; MG/100ML; MG/100ML
75 INJECTION, SOLUTION INTRAVENOUS CONTINUOUS
Status: DISCONTINUED | OUTPATIENT
Start: 2024-05-18 | End: 2024-05-20

## 2024-05-18 RX ORDER — TIZANIDINE 2 MG/1
2 TABLET ORAL EVERY 6 HOURS PRN
Status: DISCONTINUED | OUTPATIENT
Start: 2024-05-18 | End: 2024-05-21 | Stop reason: HOSPADM

## 2024-05-18 RX ORDER — ONDANSETRON HYDROCHLORIDE 2 MG/ML
4 INJECTION, SOLUTION INTRAVENOUS EVERY 4 HOURS PRN
Status: DISCONTINUED | OUTPATIENT
Start: 2024-05-18 | End: 2024-05-21 | Stop reason: HOSPADM

## 2024-05-18 RX ORDER — ENOXAPARIN SODIUM 100 MG/ML
40 INJECTION SUBCUTANEOUS DAILY
Status: DISCONTINUED | OUTPATIENT
Start: 2024-05-19 | End: 2024-05-21 | Stop reason: HOSPADM

## 2024-05-18 RX ORDER — ALUMINUM HYDROXIDE, MAGNESIUM HYDROXIDE, AND SIMETHICONE 1200; 120; 1200 MG/30ML; MG/30ML; MG/30ML
30 SUSPENSION ORAL ONCE
Status: COMPLETED | OUTPATIENT
Start: 2024-05-18 | End: 2024-05-18

## 2024-05-18 RX ORDER — MORPHINE SULFATE 4 MG/ML
4 INJECTION, SOLUTION INTRAMUSCULAR; INTRAVENOUS ONCE
Status: COMPLETED | OUTPATIENT
Start: 2024-05-18 | End: 2024-05-18

## 2024-05-18 RX ORDER — METOPROLOL TARTRATE 25 MG/1
25 TABLET, FILM COATED ORAL DAILY
COMMUNITY

## 2024-05-18 RX ORDER — FAMOTIDINE 20 MG/1
20 TABLET, FILM COATED ORAL DAILY
Status: DISCONTINUED | OUTPATIENT
Start: 2024-05-19 | End: 2024-05-21 | Stop reason: HOSPADM

## 2024-05-18 RX ORDER — LISINOPRIL 10 MG/1
10 TABLET ORAL DAILY
COMMUNITY

## 2024-05-18 RX ORDER — KIT FOR THE PREPARATION OF TECHNETIUM TC 99M MEBROFENIN 45 MG/10ML
6 INJECTION, POWDER, LYOPHILIZED, FOR SOLUTION INTRAVENOUS
Status: COMPLETED | OUTPATIENT
Start: 2024-05-18 | End: 2024-05-18

## 2024-05-18 RX ORDER — POTASSIUM CHLORIDE 20 MEQ/1
20 TABLET, EXTENDED RELEASE ORAL DAILY
COMMUNITY

## 2024-05-18 RX ORDER — KETOROLAC TROMETHAMINE 30 MG/ML
15 INJECTION, SOLUTION INTRAMUSCULAR; INTRAVENOUS ONCE
Status: COMPLETED | OUTPATIENT
Start: 2024-05-18 | End: 2024-05-18

## 2024-05-18 RX ORDER — ACETAMINOPHEN 325 MG/1
650 TABLET ORAL EVERY 6 HOURS
Status: DISCONTINUED | OUTPATIENT
Start: 2024-05-18 | End: 2024-05-21 | Stop reason: HOSPADM

## 2024-05-18 RX ADMIN — PIPERACILLIN SODIUM AND TAZOBACTAM SODIUM 3.38 G: 3; .375 INJECTION, SOLUTION INTRAVENOUS at 23:09

## 2024-05-18 RX ADMIN — KETOROLAC TROMETHAMINE 15 MG: 30 INJECTION, SOLUTION INTRAMUSCULAR at 08:54

## 2024-05-18 RX ADMIN — ACETAMINOPHEN 650 MG: 325 TABLET ORAL at 20:26

## 2024-05-18 RX ADMIN — PIPERACILLIN SODIUM AND TAZOBACTAM SODIUM 3.38 G: 3; .375 INJECTION, SOLUTION INTRAVENOUS at 01:06

## 2024-05-18 RX ADMIN — ALUMINUM HYDROXIDE, MAGNESIUM HYDROXIDE, AND SIMETHICONE 30 ML: 1200; 120; 1200 SUSPENSION ORAL at 02:24

## 2024-05-18 RX ADMIN — MORPHINE SULFATE 4 MG: 4 INJECTION, SOLUTION INTRAMUSCULAR; INTRAVENOUS at 01:38

## 2024-05-18 RX ADMIN — TIZANIDINE 2 MG: 2 TABLET ORAL at 20:24

## 2024-05-18 RX ADMIN — SODIUM CHLORIDE 500 ML: 900 INJECTION, SOLUTION INTRAVENOUS at 08:54

## 2024-05-18 RX ADMIN — TAZOBACTAM SODIUM AND PIPERACILLIN SODIUM 3.38 G: 375; 3 INJECTION, SOLUTION INTRAVENOUS at 15:40

## 2024-05-18 RX ADMIN — ONDANSETRON 4 MG: 2 INJECTION INTRAMUSCULAR; INTRAVENOUS at 20:24

## 2024-05-18 RX ADMIN — SODIUM CHLORIDE, SODIUM LACTATE, POTASSIUM CHLORIDE, AND CALCIUM CHLORIDE 75 ML/HR: 600; 310; 30; 20 INJECTION, SOLUTION INTRAVENOUS at 20:23

## 2024-05-18 RX ADMIN — KIT FOR THE PREPARATION OF TECHNETIUM TC 99M MEBROFENIN 6 MILLICURIE: 45 INJECTION, POWDER, LYOPHILIZED, FOR SOLUTION INTRAVENOUS at 09:42

## 2024-05-18 SDOH — SOCIAL STABILITY: SOCIAL NETWORK: ARE YOU MARRIED, WIDOWED, DIVORCED, SEPARATED, NEVER MARRIED, OR LIVING WITH A PARTNER?: WIDOWED

## 2024-05-18 SDOH — ECONOMIC STABILITY: FOOD INSECURITY: WITHIN THE PAST 12 MONTHS, THE FOOD YOU BOUGHT JUST DIDN'T LAST AND YOU DIDN'T HAVE MONEY TO GET MORE.: NEVER TRUE

## 2024-05-18 SDOH — SOCIAL STABILITY: SOCIAL NETWORK: HOW OFTEN DO YOU ATTEND CHURCH OR RELIGIOUS SERVICES?: NEVER

## 2024-05-18 SDOH — ECONOMIC STABILITY: INCOME INSECURITY: HOW HARD IS IT FOR YOU TO PAY FOR THE VERY BASICS LIKE FOOD, HOUSING, MEDICAL CARE, AND HEATING?: NOT HARD AT ALL

## 2024-05-18 SDOH — ECONOMIC STABILITY: FOOD INSECURITY: WITHIN THE PAST 12 MONTHS, YOU WORRIED THAT YOUR FOOD WOULD RUN OUT BEFORE YOU GOT MONEY TO BUY MORE.: NEVER TRUE

## 2024-05-18 SDOH — SOCIAL STABILITY: SOCIAL INSECURITY: WITHIN THE LAST YEAR, HAVE YOU BEEN AFRAID OF YOUR PARTNER OR EX-PARTNER?: NO

## 2024-05-18 SDOH — SOCIAL STABILITY: SOCIAL NETWORK: HOW OFTEN DO YOU GET TOGETHER WITH FRIENDS OR RELATIVES?: ONCE A WEEK

## 2024-05-18 SDOH — SOCIAL STABILITY: SOCIAL NETWORK: HOW OFTEN DO YOU ATTENT MEETINGS OF THE CLUB OR ORGANIZATION YOU BELONG TO?: NEVER

## 2024-05-18 SDOH — SOCIAL STABILITY: SOCIAL INSECURITY
WITHIN THE LAST YEAR, HAVE TO BEEN RAPED OR FORCED TO HAVE ANY KIND OF SEXUAL ACTIVITY BY YOUR PARTNER OR EX-PARTNER?: NO

## 2024-05-18 SDOH — SOCIAL STABILITY: SOCIAL INSECURITY: WITHIN THE LAST YEAR, HAVE YOU BEEN HUMILIATED OR EMOTIONALLY ABUSED IN OTHER WAYS BY YOUR PARTNER OR EX-PARTNER?: NO

## 2024-05-18 SDOH — HEALTH STABILITY: MENTAL HEALTH
STRESS IS WHEN SOMEONE FEELS TENSE, NERVOUS, ANXIOUS, OR CAN'T SLEEP AT NIGHT BECAUSE THEIR MIND IS TROUBLED. HOW STRESSED ARE YOU?: ONLY A LITTLE

## 2024-05-18 SDOH — ECONOMIC STABILITY: TRANSPORTATION INSECURITY
IN THE PAST 12 MONTHS, HAS LACK OF TRANSPORTATION KEPT YOU FROM MEETINGS, WORK, OR FROM GETTING THINGS NEEDED FOR DAILY LIVING?: NO

## 2024-05-18 SDOH — ECONOMIC STABILITY: TRANSPORTATION INSECURITY
IN THE PAST 12 MONTHS, HAS THE LACK OF TRANSPORTATION KEPT YOU FROM MEDICAL APPOINTMENTS OR FROM GETTING MEDICATIONS?: NO

## 2024-05-18 SDOH — SOCIAL STABILITY: SOCIAL NETWORK
DO YOU BELONG TO ANY CLUBS OR ORGANIZATIONS SUCH AS CHURCH GROUPS UNIONS, FRATERNAL OR ATHLETIC GROUPS, OR SCHOOL GROUPS?: NO

## 2024-05-18 SDOH — ECONOMIC STABILITY: INCOME INSECURITY: IN THE PAST 12 MONTHS, HAS THE ELECTRIC, GAS, OIL, OR WATER COMPANY THREATENED TO SHUT OFF SERVICE IN YOUR HOME?: NO

## 2024-05-18 SDOH — ECONOMIC STABILITY: INCOME INSECURITY: IN THE LAST 12 MONTHS, WAS THERE A TIME WHEN YOU WERE NOT ABLE TO PAY THE MORTGAGE OR RENT ON TIME?: NO

## 2024-05-18 SDOH — SOCIAL STABILITY: SOCIAL INSECURITY
WITHIN THE LAST YEAR, HAVE YOU BEEN KICKED, HIT, SLAPPED, OR OTHERWISE PHYSICALLY HURT BY YOUR PARTNER OR EX-PARTNER?: NO

## 2024-05-18 SDOH — HEALTH STABILITY: MENTAL HEALTH
HOW OFTEN DO YOU NEED TO HAVE SOMEONE HELP YOU WHEN YOU READ INSTRUCTIONS, PAMPHLETS, OR OTHER WRITTEN MATERIAL FROM YOUR DOCTOR OR PHARMACY?: NEVER

## 2024-05-18 SDOH — SOCIAL STABILITY: SOCIAL NETWORK: IN A TYPICAL WEEK, HOW MANY TIMES DO YOU TALK ON THE PHONE WITH FAMILY, FRIENDS, OR NEIGHBORS?: ONCE A WEEK

## 2024-05-18 SDOH — HEALTH STABILITY: PHYSICAL HEALTH: ON AVERAGE, HOW MANY MINUTES DO YOU ENGAGE IN EXERCISE AT THIS LEVEL?: 0 MIN

## 2024-05-18 SDOH — SOCIAL STABILITY: SOCIAL INSECURITY: HAVE YOU HAD THOUGHTS OF HARMING ANYONE ELSE?: NO

## 2024-05-18 SDOH — HEALTH STABILITY: PHYSICAL HEALTH: ON AVERAGE, HOW MANY DAYS PER WEEK DO YOU ENGAGE IN MODERATE TO STRENUOUS EXERCISE (LIKE A BRISK WALK)?: 0 DAYS

## 2024-05-18 SDOH — HEALTH STABILITY: MENTAL HEALTH: EXPERIENCED ANY OF THE FOLLOWING LIFE EVENTS: DEATH OF FAMILY/FRIEND

## 2024-05-18 ASSESSMENT — COGNITIVE AND FUNCTIONAL STATUS - GENERAL
CLIMB 3 TO 5 STEPS WITH RAILING: A LITTLE
DAILY ACTIVITIY SCORE: 19
MOVING TO AND FROM BED TO CHAIR: A LITTLE
DRESSING REGULAR LOWER BODY CLOTHING: A LITTLE
STANDING UP FROM CHAIR USING ARMS: A LITTLE
WALKING IN HOSPITAL ROOM: A LITTLE
MOBILITY SCORE: 20
PERSONAL GROOMING: A LITTLE
PATIENT BASELINE BEDBOUND: NO
TOILETING: A LITTLE
HELP NEEDED FOR BATHING: A LITTLE
DRESSING REGULAR UPPER BODY CLOTHING: A LITTLE

## 2024-05-18 ASSESSMENT — ENCOUNTER SYMPTOMS
FACIAL ASYMMETRY: 0
VOMITING: 0
FEVER: 0
DYSURIA: 0
CHILLS: 0
ABDOMINAL PAIN: 1
VOICE CHANGE: 0
ARTHRALGIAS: 0
HEADACHES: 0
HEMATURIA: 0
TROUBLE SWALLOWING: 0
ADENOPATHY: 0
BRUISES/BLEEDS EASILY: 0
SHORTNESS OF BREATH: 0
ABDOMINAL DISTENTION: 1
SORE THROAT: 0
SPEECH DIFFICULTY: 0
DIARRHEA: 1
BLOOD IN STOOL: 0
WOUND: 0
UNEXPECTED WEIGHT CHANGE: 0
NAUSEA: 0
CHEST TIGHTNESS: 0
PALPITATIONS: 0

## 2024-05-18 ASSESSMENT — ACTIVITIES OF DAILY LIVING (ADL)
BATHING: INDEPENDENT
TOILETING: INDEPENDENT
DRESSING YOURSELF: INDEPENDENT
ADEQUATE_TO_COMPLETE_ADL: YES
WALKS IN HOME: NEEDS ASSISTANCE
HEARING - RIGHT EAR: FUNCTIONAL
GROOMING: INDEPENDENT
JUDGMENT_ADEQUATE_SAFELY_COMPLETE_DAILY_ACTIVITIES: YES
PATIENT'S MEMORY ADEQUATE TO SAFELY COMPLETE DAILY ACTIVITIES?: YES
HEARING - LEFT EAR: FUNCTIONAL
FEEDING YOURSELF: INDEPENDENT

## 2024-05-18 ASSESSMENT — LIFESTYLE VARIABLES
AUDIT-C TOTAL SCORE: 0
HOW OFTEN DURING THE LAST YEAR HAVE YOU HAD A FEELING OF GUILT OR REMORSE AFTER DRINKING: WEEKLY
AUDIT TOTAL SCORE: 10
SKIP TO QUESTIONS 9-10: 0
SUBSTANCE_ABUSE_PAST_12_MONTHS: NO
PRESCIPTION_ABUSE_PAST_12_MONTHS: NO
AUDIT-C TOTAL SCORE: 10
HOW OFTEN DO YOU HAVE A DRINK CONTAINING ALCOHOL: NEVER
HOW MANY STANDARD DRINKS CONTAINING ALCOHOL DO YOU HAVE ON A TYPICAL DAY: 5 OR 6
AUDIT-C TOTAL SCORE: 0
HAVE YOU OR SOMEONE ELSE BEEN INJURED AS A RESULT OF YOUR DRINKING: NO
HOW OFTEN DURING THE LAST YEAR HAVE YOU FAILED TO DO WHAT WAS NORMALLY EXPECTED FROM YOU BECAUSE OF DRINKING: MONTHLY
HOW OFTEN DO YOU HAVE A DRINK CONTAINING ALCOHOL: 4 OR MORE TIMES A WEEK
AUDIT TOTAL SCORE: 20
HOW OFTEN DURING THE LAST YEAR HAVE YOU BEEN UNABLE TO REMEMBER WHAT HAPPENED THE NIGHT BEFORE BECAUSE YOU HAD BEEN DRINKING: MONTHLY
HOW OFTEN DURING THE LAST YEAR HAVE YOU NEEDED AN ALCOHOLIC DRINK FIRST THING IN THE MORNING TO GET YOURSELF GOING AFTER A NIGHT OF HEAVY DRINKING: WEEKLY
HOW OFTEN DO YOU HAVE 6 OR MORE DRINKS ON ONE OCCASION: DAILY OR ALMOST DAILY
SKIP TO QUESTIONS 9-10: 1
HAS A RELATIVE, FRIEND, DOCTOR, OR ANOTHER HEALTH PROFESSIONAL EXPRESSED CONCERN ABOUT YOUR DRINKING OR SUGGESTED YOU CUT DOWN: NO
HOW MANY STANDARD DRINKS CONTAINING ALCOHOL DO YOU HAVE ON A TYPICAL DAY: PATIENT DOES NOT DRINK
AUDIT-C TOTAL SCORE: 10
HOW OFTEN DO YOU HAVE 6 OR MORE DRINKS ON ONE OCCASION: NEVER
HOW OFTEN DURING THE LAST YEAR HAVE YOU FOUND THAT YOU WERE NOT ABLE TO STOP DRINKING ONCE YOU HAD STARTED: NEVER

## 2024-05-18 ASSESSMENT — PAIN SCALES - GENERAL
PAINLEVEL_OUTOF10: 4
PAINLEVEL_OUTOF10: 2
PAINLEVEL_OUTOF10: 2
PAINLEVEL_OUTOF10: 7

## 2024-05-18 ASSESSMENT — PATIENT HEALTH QUESTIONNAIRE - PHQ9
2. FEELING DOWN, DEPRESSED OR HOPELESS: SEVERAL DAYS
SUM OF ALL RESPONSES TO PHQ9 QUESTIONS 1 & 2: 2
1. LITTLE INTEREST OR PLEASURE IN DOING THINGS: SEVERAL DAYS

## 2024-05-18 ASSESSMENT — PAIN - FUNCTIONAL ASSESSMENT
PAIN_FUNCTIONAL_ASSESSMENT: 0-10
PAIN_FUNCTIONAL_ASSESSMENT: 0-10

## 2024-05-18 ASSESSMENT — COLUMBIA-SUICIDE SEVERITY RATING SCALE - C-SSRS
1. IN THE PAST MONTH, HAVE YOU WISHED YOU WERE DEAD OR WISHED YOU COULD GO TO SLEEP AND NOT WAKE UP?: NO
6. HAVE YOU EVER DONE ANYTHING, STARTED TO DO ANYTHING, OR PREPARED TO DO ANYTHING TO END YOUR LIFE?: NO
2. HAVE YOU ACTUALLY HAD ANY THOUGHTS OF KILLING YOURSELF?: NO

## 2024-05-18 NOTE — PROGRESS NOTES
Patient received in sign out from Dr Paige. Patient with abdominal pain pending ultrasound.    Ultrasound equivocal as well.  Dr. Multani recommends HIDA scan.  This is positive for acute cholecystitis.  Patient accepted to surgery service.  [facility] [place].  Parts of this chart were completed with dictation software, please excuse any errors in transcription.

## 2024-05-18 NOTE — ED PROVIDER NOTES
HPI   No chief complaint on file.      This is a 79-year-old male with a past medical history of GERD presenting to the ED for evaluation of abdominal pain, bloating, nausea and vomiting.  He was seen in the emergency department 5 days ago with similar symptoms, had a full workup, and was sent home with medication for treatment.  He returns today with the same symptoms which are persistent.  He complains of worsening left lower quadrant abdominal pain as well.  He denies any chest pain or cough or congestion, fevers or chills, shortness of breath.      History provided by:  Patient and medical records   used: No                        No data recorded                   Patient History   Past Medical History:   Diagnosis Date    CHF (congestive heart failure) (Multi)     Hypertension      No past surgical history on file.  No family history on file.  Social History     Tobacco Use    Smoking status: Never    Smokeless tobacco: Never   Vaping Use    Vaping status: Never Used   Substance Use Topics    Alcohol use: Yes     Alcohol/week: 14.0 standard drinks of alcohol     Types: 14 Cans of beer per week    Drug use: Never       Physical Exam   ED Triage Vitals   Temperature Heart Rate Respirations BP   05/17/24 2107 05/17/24 2108 05/17/24 2108 05/17/24 2108   36.5 °C (97.7 °F) 98 18 123/73      Pulse Ox Temp Source Heart Rate Source Patient Position   05/17/24 2108 05/17/24 2108 05/17/24 2108 --   94 % Oral Monitor       BP Location FiO2 (%)     05/17/24 2108 --     Right arm        Physical Exam  General: well developed, well nourished 79-year-old male who is awake alert, he Is present, otherwise in no acute distress  Eyes: sclera clear bilaterally, PERRL, EOMI  HENT: normocephalic, atraumatic.   CV: regular rate and rhythm, no murmur, no gallops, or rubs.   Resp: clear to ascultation bilaterally, no wheezes, rales, or rhonchi  GI: abdomen soft, tender to palpation in the left lower quadrant without  rigidity or guarding, no peritoneal signs, abdomen is nondistended, no masses palpated  MSK: no swelling of the extremities.  Psych: appropriate mood and affect, cooperative with exam  Skin: warm, dry, without evidence of rash or abrasions    ED Course & MDM   ED Course as of 05/18/24 0242   Sat May 18, 2024   0026 EKG shows sinus tachycardia with PVCs present.  Rate is 106 beats minute.  Right bundle branch block morphology is present.  There is no ST elevation or depression, no acute ischemic pattern.  No STEMI.  QTc is 571 ms, MS interval 142.  Morphology unchanged compared with EKG from 5 days ago. [NT]   0212 I spoke with the general surgeon Dr. Multani over the phone who is requesting an ultrasound of the right upper quadrant.  She is not highly suspicious of acute cholecystitis given lack of any leukocytosis or gallstones on CT imaging.  She wants to guide her next steps based on ultrasound imaging.  Patient will stay in the ED until ultrasound is available in the morning to have this done, further disposition will be decided afterwards. [NT]      ED Course User Index  [NT] Oliver Paige DO         Diagnoses as of 05/18/24 0242   Abdominal pain, generalized   Diverticulitis       Medical Decision Making  PMH: GERD  History obtained: directly from patient, review of recent ED visit results  Social factors affecting disposition: none    He is in no acute distress in the ED, he does have ongoing hiccups here.  He complains of left lower quadrant Johnson pain which is tender to palpation on exam, no peritoneal signs present.  I reviewed his workup from his last ED visit as well as a CT scan which showed nonspecific enteritis.  Due to worsening symptoms and now focal tenderness on exam will repeat scan and labs, treat with Pepcid, Zofran.    Labs show no leukocytosis, normal LFTs and lipase.  CT imaging shows interval development of uncomplicated diverticulitis consistent with his left lower quadrant pain.   Also shows a distended gallbladder with pericholecystic fluid, radiologist notes high concern for acute cholecystitis.  The patient does have tenderness to palpation in the right upper quadrant.  I am unable to get an ultrasound at this time of night to confirm this.  Surgical consult placed.    General surgeon Dr. Multani recommends ultrasound given the lack of gallstones or leukocytosis on labs to determine if he does have acute cholecystitis or not.  She feels the fluid is more likely due to his enteritis.  If this is the case he can likely be discharged on antibiotics and treated for his diverticulitis, if he does have cholecystitis he will need to be transferred for surgery.    Care of the patient signed out to the oncoming physician pending ultrasound results in the morning.    Procedure  Procedures     Oliver Paige DO  05/18/24 0242

## 2024-05-19 ENCOUNTER — APPOINTMENT (OUTPATIENT)
Dept: RADIOLOGY | Facility: HOSPITAL | Age: 79
DRG: 417 | End: 2024-05-19
Payer: MEDICARE

## 2024-05-19 ENCOUNTER — ANESTHESIA (OUTPATIENT)
Dept: OPERATING ROOM | Facility: HOSPITAL | Age: 79
DRG: 417 | End: 2024-05-19
Payer: MEDICARE

## 2024-05-19 ENCOUNTER — ANESTHESIA EVENT (OUTPATIENT)
Dept: OPERATING ROOM | Facility: HOSPITAL | Age: 79
DRG: 417 | End: 2024-05-19
Payer: MEDICARE

## 2024-05-19 LAB
ALBUMIN SERPL-MCNC: 2 G/DL (ref 3.5–5)
ALP BLD-CCNC: 125 U/L (ref 35–125)
ALT SERPL-CCNC: 36 U/L (ref 5–40)
ANION GAP SERPL CALC-SCNC: 14 MMOL/L
AST SERPL-CCNC: 37 U/L (ref 5–40)
BILIRUB SERPL-MCNC: 0.7 MG/DL (ref 0.1–1.2)
BUN SERPL-MCNC: 25 MG/DL (ref 8–25)
CALCIUM SERPL-MCNC: 7.7 MG/DL (ref 8.5–10.4)
CHLORIDE SERPL-SCNC: 98 MMOL/L (ref 97–107)
CO2 SERPL-SCNC: 22 MMOL/L (ref 24–31)
CREAT SERPL-MCNC: 1.2 MG/DL (ref 0.4–1.6)
EGFRCR SERPLBLD CKD-EPI 2021: 62 ML/MIN/1.73M*2
ERYTHROCYTE [DISTWIDTH] IN BLOOD BY AUTOMATED COUNT: 12.7 % (ref 11.5–14.5)
GLUCOSE BLD MANUAL STRIP-MCNC: 119 MG/DL (ref 74–99)
GLUCOSE SERPL-MCNC: 130 MG/DL (ref 65–99)
HCT VFR BLD AUTO: 37.2 % (ref 41–52)
HGB BLD-MCNC: 12.3 G/DL (ref 13.5–17.5)
LACTATE BLDV-SCNC: 1 MMOL/L (ref 0.4–2)
MCH RBC QN AUTO: 31.7 PG (ref 26–34)
MCHC RBC AUTO-ENTMCNC: 33.1 G/DL (ref 32–36)
MCV RBC AUTO: 96 FL (ref 80–100)
NRBC BLD-RTO: 0 /100 WBCS (ref 0–0)
PLATELET # BLD AUTO: 273 X10*3/UL (ref 150–450)
POTASSIUM SERPL-SCNC: 4.2 MMOL/L (ref 3.4–5.1)
PROT SERPL-MCNC: 5.1 G/DL (ref 5.9–7.9)
RBC # BLD AUTO: 3.88 X10*6/UL (ref 4.5–5.9)
SODIUM SERPL-SCNC: 134 MMOL/L (ref 133–145)
TROPONIN T SERPL-MCNC: 20 NG/L
TROPONIN T SERPL-MCNC: 25 NG/L
WBC # BLD AUTO: 7.2 X10*3/UL (ref 4.4–11.3)

## 2024-05-19 PROCEDURE — 47562 LAPAROSCOPIC CHOLECYSTECTOMY: CPT | Performed by: STUDENT IN AN ORGANIZED HEALTH CARE EDUCATION/TRAINING PROGRAM

## 2024-05-19 PROCEDURE — 2720000007 HC OR 272 NO HCPCS: Performed by: STUDENT IN AN ORGANIZED HEALTH CARE EDUCATION/TRAINING PROGRAM

## 2024-05-19 PROCEDURE — 2500000005 HC RX 250 GENERAL PHARMACY W/O HCPCS: Performed by: STUDENT IN AN ORGANIZED HEALTH CARE EDUCATION/TRAINING PROGRAM

## 2024-05-19 PROCEDURE — 71260 CT THORAX DX C+: CPT | Performed by: STUDENT IN AN ORGANIZED HEALTH CARE EDUCATION/TRAINING PROGRAM

## 2024-05-19 PROCEDURE — 84484 ASSAY OF TROPONIN QUANT: CPT | Performed by: INTERNAL MEDICINE

## 2024-05-19 PROCEDURE — 3700000001 HC GENERAL ANESTHESIA TIME - INITIAL BASE CHARGE: Performed by: STUDENT IN AN ORGANIZED HEALTH CARE EDUCATION/TRAINING PROGRAM

## 2024-05-19 PROCEDURE — 83605 ASSAY OF LACTIC ACID: CPT | Performed by: INTERNAL MEDICINE

## 2024-05-19 PROCEDURE — 51702 INSERT TEMP BLADDER CATH: CPT

## 2024-05-19 PROCEDURE — 2500000004 HC RX 250 GENERAL PHARMACY W/ HCPCS (ALT 636 FOR OP/ED): Performed by: INTERNAL MEDICINE

## 2024-05-19 PROCEDURE — 2500000001 HC RX 250 WO HCPCS SELF ADMINISTERED DRUGS (ALT 637 FOR MEDICARE OP): Performed by: STUDENT IN AN ORGANIZED HEALTH CARE EDUCATION/TRAINING PROGRAM

## 2024-05-19 PROCEDURE — 2500000004 HC RX 250 GENERAL PHARMACY W/ HCPCS (ALT 636 FOR OP/ED): Performed by: STUDENT IN AN ORGANIZED HEALTH CARE EDUCATION/TRAINING PROGRAM

## 2024-05-19 PROCEDURE — 2500000005 HC RX 250 GENERAL PHARMACY W/O HCPCS: Performed by: NURSE ANESTHETIST, CERTIFIED REGISTERED

## 2024-05-19 PROCEDURE — 99232 SBSQ HOSP IP/OBS MODERATE 35: CPT | Performed by: STUDENT IN AN ORGANIZED HEALTH CARE EDUCATION/TRAINING PROGRAM

## 2024-05-19 PROCEDURE — 2780000003 HC OR 278 NO HCPCS: Performed by: STUDENT IN AN ORGANIZED HEALTH CARE EDUCATION/TRAINING PROGRAM

## 2024-05-19 PROCEDURE — 74177 CT ABD & PELVIS W/CONTRAST: CPT

## 2024-05-19 PROCEDURE — A47562 PR LAP,CHOLECYSTECTOMY: Performed by: NURSE ANESTHETIST, CERTIFIED REGISTERED

## 2024-05-19 PROCEDURE — 2500000002 HC RX 250 W HCPCS SELF ADMINISTERED DRUGS (ALT 637 FOR MEDICARE OP, ALT 636 FOR OP/ED): Performed by: STUDENT IN AN ORGANIZED HEALTH CARE EDUCATION/TRAINING PROGRAM

## 2024-05-19 PROCEDURE — 2500000004 HC RX 250 GENERAL PHARMACY W/ HCPCS (ALT 636 FOR OP/ED): Performed by: NURSE ANESTHETIST, CERTIFIED REGISTERED

## 2024-05-19 PROCEDURE — 7100000001 HC RECOVERY ROOM TIME - INITIAL BASE CHARGE: Performed by: STUDENT IN AN ORGANIZED HEALTH CARE EDUCATION/TRAINING PROGRAM

## 2024-05-19 PROCEDURE — 74177 CT ABD & PELVIS W/CONTRAST: CPT | Performed by: STUDENT IN AN ORGANIZED HEALTH CARE EDUCATION/TRAINING PROGRAM

## 2024-05-19 PROCEDURE — 2500000004 HC RX 250 GENERAL PHARMACY W/ HCPCS (ALT 636 FOR OP/ED): Performed by: ANESTHESIOLOGY

## 2024-05-19 PROCEDURE — 84484 ASSAY OF TROPONIN QUANT: CPT | Mod: 91 | Performed by: INTERNAL MEDICINE

## 2024-05-19 PROCEDURE — 71045 X-RAY EXAM CHEST 1 VIEW: CPT | Performed by: STUDENT IN AN ORGANIZED HEALTH CARE EDUCATION/TRAINING PROGRAM

## 2024-05-19 PROCEDURE — 3600000008 HC OR TIME - EACH INCREMENTAL 1 MINUTE - PROCEDURE LEVEL THREE: Performed by: STUDENT IN AN ORGANIZED HEALTH CARE EDUCATION/TRAINING PROGRAM

## 2024-05-19 PROCEDURE — 99100 ANES PT EXTEME AGE<1 YR&>70: CPT | Performed by: ANESTHESIOLOGY

## 2024-05-19 PROCEDURE — 36415 COLL VENOUS BLD VENIPUNCTURE: CPT | Performed by: STUDENT IN AN ORGANIZED HEALTH CARE EDUCATION/TRAINING PROGRAM

## 2024-05-19 PROCEDURE — 80053 COMPREHEN METABOLIC PANEL: CPT | Performed by: STUDENT IN AN ORGANIZED HEALTH CARE EDUCATION/TRAINING PROGRAM

## 2024-05-19 PROCEDURE — 94640 AIRWAY INHALATION TREATMENT: CPT

## 2024-05-19 PROCEDURE — 74018 RADEX ABDOMEN 1 VIEW: CPT

## 2024-05-19 PROCEDURE — A47562 PR LAP,CHOLECYSTECTOMY: Performed by: ANESTHESIOLOGY

## 2024-05-19 PROCEDURE — 88304 TISSUE EXAM BY PATHOLOGIST: CPT | Mod: TC | Performed by: STUDENT IN AN ORGANIZED HEALTH CARE EDUCATION/TRAINING PROGRAM

## 2024-05-19 PROCEDURE — 7100000002 HC RECOVERY ROOM TIME - EACH INCREMENTAL 1 MINUTE: Performed by: STUDENT IN AN ORGANIZED HEALTH CARE EDUCATION/TRAINING PROGRAM

## 2024-05-19 PROCEDURE — 2550000001 HC RX 255 CONTRASTS: Performed by: STUDENT IN AN ORGANIZED HEALTH CARE EDUCATION/TRAINING PROGRAM

## 2024-05-19 PROCEDURE — 0FT44ZZ RESECTION OF GALLBLADDER, PERCUTANEOUS ENDOSCOPIC APPROACH: ICD-10-PCS | Performed by: STUDENT IN AN ORGANIZED HEALTH CARE EDUCATION/TRAINING PROGRAM

## 2024-05-19 PROCEDURE — A4550 SURGICAL TRAYS: HCPCS | Performed by: STUDENT IN AN ORGANIZED HEALTH CARE EDUCATION/TRAINING PROGRAM

## 2024-05-19 PROCEDURE — 3600000003 HC OR TIME - INITIAL BASE CHARGE - PROCEDURE LEVEL THREE: Performed by: STUDENT IN AN ORGANIZED HEALTH CARE EDUCATION/TRAINING PROGRAM

## 2024-05-19 PROCEDURE — 3700000002 HC GENERAL ANESTHESIA TIME - EACH INCREMENTAL 1 MINUTE: Performed by: STUDENT IN AN ORGANIZED HEALTH CARE EDUCATION/TRAINING PROGRAM

## 2024-05-19 PROCEDURE — 82947 ASSAY GLUCOSE BLOOD QUANT: CPT

## 2024-05-19 PROCEDURE — 71045 X-RAY EXAM CHEST 1 VIEW: CPT

## 2024-05-19 PROCEDURE — 36415 COLL VENOUS BLD VENIPUNCTURE: CPT | Performed by: INTERNAL MEDICINE

## 2024-05-19 PROCEDURE — 1100000001 HC PRIVATE ROOM DAILY

## 2024-05-19 PROCEDURE — 88304 TISSUE EXAM BY PATHOLOGIST: CPT | Performed by: PATHOLOGY

## 2024-05-19 PROCEDURE — 85027 COMPLETE CBC AUTOMATED: CPT | Performed by: STUDENT IN AN ORGANIZED HEALTH CARE EDUCATION/TRAINING PROGRAM

## 2024-05-19 PROCEDURE — 99140 ANES COMP EMERGENCY COND: CPT | Performed by: ANESTHESIOLOGY

## 2024-05-19 PROCEDURE — 9420000001 HC RT PATIENT EDUCATION 5 MIN

## 2024-05-19 PROCEDURE — 2500000006 HC RX 250 W HCPCS SELF ADMINISTERED DRUGS (ALT 637 FOR ALL PAYERS): Performed by: STUDENT IN AN ORGANIZED HEALTH CARE EDUCATION/TRAINING PROGRAM

## 2024-05-19 RX ORDER — HEPARIN SODIUM 5000 [USP'U]/ML
5000 INJECTION, SOLUTION INTRAVENOUS; SUBCUTANEOUS ONCE
Status: COMPLETED | OUTPATIENT
Start: 2024-05-19 | End: 2024-05-19

## 2024-05-19 RX ORDER — LIDOCAINE HYDROCHLORIDE AND EPINEPHRINE 10; 10 MG/ML; UG/ML
INJECTION, SOLUTION INFILTRATION; PERINEURAL AS NEEDED
Status: DISCONTINUED | OUTPATIENT
Start: 2024-05-19 | End: 2024-05-19 | Stop reason: HOSPADM

## 2024-05-19 RX ORDER — IPRATROPIUM BROMIDE AND ALBUTEROL SULFATE 2.5; .5 MG/3ML; MG/3ML
3 SOLUTION RESPIRATORY (INHALATION) EVERY 2 HOUR PRN
Status: DISCONTINUED | OUTPATIENT
Start: 2024-05-19 | End: 2024-05-21 | Stop reason: HOSPADM

## 2024-05-19 RX ORDER — TRAMADOL HYDROCHLORIDE 50 MG/1
50 TABLET ORAL EVERY 6 HOURS PRN
Status: DISCONTINUED | OUTPATIENT
Start: 2024-05-19 | End: 2024-05-21 | Stop reason: HOSPADM

## 2024-05-19 RX ORDER — ROCURONIUM BROMIDE 10 MG/ML
INJECTION, SOLUTION INTRAVENOUS AS NEEDED
Status: DISCONTINUED | OUTPATIENT
Start: 2024-05-19 | End: 2024-05-19

## 2024-05-19 RX ORDER — ALBUTEROL SULFATE 0.83 MG/ML
2.5 SOLUTION RESPIRATORY (INHALATION) ONCE AS NEEDED
Status: DISCONTINUED | OUTPATIENT
Start: 2024-05-19 | End: 2024-05-19 | Stop reason: HOSPADM

## 2024-05-19 RX ORDER — PROPOFOL 10 MG/ML
INJECTION, EMULSION INTRAVENOUS AS NEEDED
Status: DISCONTINUED | OUTPATIENT
Start: 2024-05-19 | End: 2024-05-19

## 2024-05-19 RX ORDER — MIDAZOLAM HYDROCHLORIDE 1 MG/ML
INJECTION, SOLUTION INTRAMUSCULAR; INTRAVENOUS AS NEEDED
Status: DISCONTINUED | OUTPATIENT
Start: 2024-05-19 | End: 2024-05-19

## 2024-05-19 RX ORDER — TRAMADOL HYDROCHLORIDE 50 MG/1
50 TABLET ORAL EVERY 8 HOURS PRN
Status: DISCONTINUED | OUTPATIENT
Start: 2024-05-19 | End: 2024-05-19

## 2024-05-19 RX ORDER — TRAMADOL HYDROCHLORIDE 50 MG/1
50 TABLET ORAL 2 TIMES DAILY PRN
COMMUNITY

## 2024-05-19 RX ORDER — SODIUM CHLORIDE, SODIUM LACTATE, POTASSIUM CHLORIDE, CALCIUM CHLORIDE 600; 310; 30; 20 MG/100ML; MG/100ML; MG/100ML; MG/100ML
100 INJECTION, SOLUTION INTRAVENOUS CONTINUOUS
Status: DISCONTINUED | OUTPATIENT
Start: 2024-05-19 | End: 2024-05-19 | Stop reason: HOSPADM

## 2024-05-19 RX ORDER — ALBUTEROL SULFATE 0.83 MG/ML
2.5 SOLUTION RESPIRATORY (INHALATION) EVERY 6 HOURS PRN
COMMUNITY

## 2024-05-19 RX ORDER — ASPIRIN 81 MG/1
81 TABLET ORAL DAILY
COMMUNITY

## 2024-05-19 RX ORDER — HYDRALAZINE HYDROCHLORIDE 20 MG/ML
5 INJECTION INTRAMUSCULAR; INTRAVENOUS EVERY 30 MIN PRN
Status: DISCONTINUED | OUTPATIENT
Start: 2024-05-19 | End: 2024-05-19 | Stop reason: HOSPADM

## 2024-05-19 RX ORDER — FENTANYL CITRATE 50 UG/ML
50 INJECTION, SOLUTION INTRAMUSCULAR; INTRAVENOUS EVERY 5 MIN PRN
Status: DISCONTINUED | OUTPATIENT
Start: 2024-05-19 | End: 2024-05-19 | Stop reason: HOSPADM

## 2024-05-19 RX ORDER — IPRATROPIUM BROMIDE AND ALBUTEROL SULFATE 2.5; .5 MG/3ML; MG/3ML
3 SOLUTION RESPIRATORY (INHALATION)
Status: DISCONTINUED | OUTPATIENT
Start: 2024-05-19 | End: 2024-05-21 | Stop reason: HOSPADM

## 2024-05-19 RX ORDER — SUCCINYLCHOLINE CHLORIDE 20 MG/ML
INJECTION INTRAMUSCULAR; INTRAVENOUS AS NEEDED
Status: DISCONTINUED | OUTPATIENT
Start: 2024-05-19 | End: 2024-05-19

## 2024-05-19 RX ORDER — IPRATROPIUM BROMIDE AND ALBUTEROL SULFATE 2.5; .5 MG/3ML; MG/3ML
3 SOLUTION RESPIRATORY (INHALATION)
Status: DISCONTINUED | OUTPATIENT
Start: 2024-05-19 | End: 2024-05-19

## 2024-05-19 RX ORDER — ONDANSETRON HYDROCHLORIDE 2 MG/ML
INJECTION, SOLUTION INTRAVENOUS AS NEEDED
Status: DISCONTINUED | OUTPATIENT
Start: 2024-05-19 | End: 2024-05-19

## 2024-05-19 RX ORDER — ONDANSETRON HYDROCHLORIDE 2 MG/ML
4 INJECTION, SOLUTION INTRAVENOUS ONCE AS NEEDED
Status: DISCONTINUED | OUTPATIENT
Start: 2024-05-19 | End: 2024-05-19 | Stop reason: HOSPADM

## 2024-05-19 RX ORDER — FENTANYL CITRATE 50 UG/ML
INJECTION, SOLUTION INTRAMUSCULAR; INTRAVENOUS AS NEEDED
Status: DISCONTINUED | OUTPATIENT
Start: 2024-05-19 | End: 2024-05-19

## 2024-05-19 RX ORDER — MIDAZOLAM HYDROCHLORIDE 1 MG/ML
1 INJECTION, SOLUTION INTRAMUSCULAR; INTRAVENOUS ONCE AS NEEDED
Status: DISCONTINUED | OUTPATIENT
Start: 2024-05-19 | End: 2024-05-19 | Stop reason: HOSPADM

## 2024-05-19 RX ORDER — KETOROLAC TROMETHAMINE 30 MG/ML
15 INJECTION, SOLUTION INTRAMUSCULAR; INTRAVENOUS EVERY 6 HOURS PRN
Status: DISCONTINUED | OUTPATIENT
Start: 2024-05-19 | End: 2024-05-21 | Stop reason: HOSPADM

## 2024-05-19 RX ORDER — LIDOCAINE HYDROCHLORIDE 10 MG/ML
0.1 INJECTION INFILTRATION; PERINEURAL ONCE
Status: DISCONTINUED | OUTPATIENT
Start: 2024-05-19 | End: 2024-05-19 | Stop reason: HOSPADM

## 2024-05-19 RX ADMIN — HEPARIN SODIUM 5000 UNITS: 5000 INJECTION, SOLUTION INTRAVENOUS; SUBCUTANEOUS at 09:37

## 2024-05-19 RX ADMIN — FENTANYL CITRATE 50 MCG: 0.05 INJECTION, SOLUTION INTRAMUSCULAR; INTRAVENOUS at 11:45

## 2024-05-19 RX ADMIN — SODIUM CHLORIDE, SODIUM LACTATE, POTASSIUM CHLORIDE, AND CALCIUM CHLORIDE 75 ML/HR: 600; 310; 30; 20 INJECTION, SOLUTION INTRAVENOUS at 16:10

## 2024-05-19 RX ADMIN — FENTANYL CITRATE 50 MCG: 50 INJECTION INTRAMUSCULAR; INTRAVENOUS at 12:32

## 2024-05-19 RX ADMIN — PROPOFOL 150 MG: 10 INJECTION, EMULSION INTRAVENOUS at 10:13

## 2024-05-19 RX ADMIN — ACETAMINOPHEN 650 MG: 325 TABLET ORAL at 03:41

## 2024-05-19 RX ADMIN — HYDROMORPHONE HYDROCHLORIDE 0.4 MG: 1 INJECTION, SOLUTION INTRAMUSCULAR; INTRAVENOUS; SUBCUTANEOUS at 12:20

## 2024-05-19 RX ADMIN — Medication 2 L/MIN: at 19:48

## 2024-05-19 RX ADMIN — IPRATROPIUM BROMIDE AND ALBUTEROL SULFATE 3 ML: 2.5; .5 SOLUTION RESPIRATORY (INHALATION) at 07:10

## 2024-05-19 RX ADMIN — PIPERACILLIN SODIUM AND TAZOBACTAM SODIUM 3.38 G: 3; .375 INJECTION, SOLUTION INTRAVENOUS at 04:01

## 2024-05-19 RX ADMIN — MIDAZOLAM 2 MG: 1 INJECTION INTRAMUSCULAR; INTRAVENOUS at 10:42

## 2024-05-19 RX ADMIN — SUCCINYLCHOLINE CHLORIDE 160 MG: 20 INJECTION, SOLUTION INTRAMUSCULAR; INTRAVENOUS at 10:13

## 2024-05-19 RX ADMIN — IPRATROPIUM BROMIDE AND ALBUTEROL SULFATE 3 ML: 2.5; .5 SOLUTION RESPIRATORY (INHALATION) at 03:55

## 2024-05-19 RX ADMIN — IOHEXOL 75 ML: 350 INJECTION, SOLUTION INTRAVENOUS at 07:50

## 2024-05-19 RX ADMIN — SUGAMMADEX 100 MG: 100 INJECTION, SOLUTION INTRAVENOUS at 11:33

## 2024-05-19 RX ADMIN — ROCURONIUM BROMIDE 10 MG: 10 INJECTION, SOLUTION INTRAVENOUS at 10:50

## 2024-05-19 RX ADMIN — ACETAMINOPHEN 650 MG: 325 TABLET ORAL at 21:15

## 2024-05-19 RX ADMIN — FENTANYL CITRATE 25 MCG: 0.05 INJECTION, SOLUTION INTRAMUSCULAR; INTRAVENOUS at 10:13

## 2024-05-19 RX ADMIN — ONDANSETRON HYDROCHLORIDE 4 MG: 2 INJECTION INTRAMUSCULAR; INTRAVENOUS at 10:43

## 2024-05-19 RX ADMIN — KETOROLAC TROMETHAMINE 15 MG: 30 INJECTION, SOLUTION INTRAMUSCULAR at 16:03

## 2024-05-19 RX ADMIN — TIZANIDINE 2 MG: 2 TABLET ORAL at 15:56

## 2024-05-19 RX ADMIN — HYDROMORPHONE HYDROCHLORIDE 0.4 MG: 1 INJECTION, SOLUTION INTRAMUSCULAR; INTRAVENOUS; SUBCUTANEOUS at 12:09

## 2024-05-19 RX ADMIN — Medication 5 L/MIN: at 07:13

## 2024-05-19 RX ADMIN — Medication 5 L/MIN: at 04:08

## 2024-05-19 RX ADMIN — TRAMADOL HYDROCHLORIDE 50 MG: 50 TABLET ORAL at 17:11

## 2024-05-19 RX ADMIN — DEXAMETHASONE SODIUM PHOSPHATE 4 MG: 4 INJECTION, SOLUTION INTRAMUSCULAR; INTRAVENOUS at 10:43

## 2024-05-19 RX ADMIN — KETOROLAC TROMETHAMINE 15 MG: 30 INJECTION, SOLUTION INTRAMUSCULAR at 01:44

## 2024-05-19 RX ADMIN — PIPERACILLIN SODIUM AND TAZOBACTAM SODIUM 3.38 G: 3; .375 INJECTION, SOLUTION INTRAVENOUS at 09:37

## 2024-05-19 RX ADMIN — PIPERACILLIN SODIUM AND TAZOBACTAM SODIUM 3.38 G: 3; .375 INJECTION, SOLUTION INTRAVENOUS at 21:19

## 2024-05-19 RX ADMIN — SODIUM CHLORIDE: 9 INJECTION, SOLUTION INTRAVENOUS at 10:08

## 2024-05-19 RX ADMIN — PIPERACILLIN SODIUM AND TAZOBACTAM SODIUM 3.38 G: 3; .375 INJECTION, SOLUTION INTRAVENOUS at 16:00

## 2024-05-19 RX ADMIN — IPRATROPIUM BROMIDE AND ALBUTEROL SULFATE 3 ML: 2.5; .5 SOLUTION RESPIRATORY (INHALATION) at 19:48

## 2024-05-19 RX ADMIN — ROCURONIUM BROMIDE 10 MG: 10 INJECTION, SOLUTION INTRAVENOUS at 10:38

## 2024-05-19 RX ADMIN — FENTANYL CITRATE 25 MCG: 0.05 INJECTION, SOLUTION INTRAMUSCULAR; INTRAVENOUS at 10:28

## 2024-05-19 RX ADMIN — KETOROLAC TROMETHAMINE 15 MG: 30 INJECTION, SOLUTION INTRAMUSCULAR at 21:18

## 2024-05-19 RX ADMIN — ACETAMINOPHEN 650 MG: 325 TABLET ORAL at 15:56

## 2024-05-19 RX ADMIN — HYDROMORPHONE HYDROCHLORIDE 0.4 MG: 1 INJECTION, SOLUTION INTRAMUSCULAR; INTRAVENOUS; SUBCUTANEOUS at 11:58

## 2024-05-19 RX ADMIN — HYDROMORPHONE HYDROCHLORIDE 0.4 MG: 1 INJECTION, SOLUTION INTRAMUSCULAR; INTRAVENOUS; SUBCUTANEOUS at 12:04

## 2024-05-19 RX ADMIN — FENTANYL CITRATE 50 MCG: 50 INJECTION INTRAMUSCULAR; INTRAVENOUS at 12:27

## 2024-05-19 RX ADMIN — SODIUM CHLORIDE 1000 ML: 900 INJECTION, SOLUTION INTRAVENOUS at 06:36

## 2024-05-19 SDOH — HEALTH STABILITY: MENTAL HEALTH: CURRENT SMOKER: 0

## 2024-05-19 ASSESSMENT — COGNITIVE AND FUNCTIONAL STATUS - GENERAL
HELP NEEDED FOR BATHING: A LITTLE
PERSONAL GROOMING: A LITTLE
DAILY ACTIVITIY SCORE: 17
TOILETING: A LOT
MOBILITY SCORE: 17
DRESSING REGULAR LOWER BODY CLOTHING: A LOT
WALKING IN HOSPITAL ROOM: A LOT
WALKING IN HOSPITAL ROOM: A LOT
MOVING TO AND FROM BED TO CHAIR: A LITTLE
TOILETING: A LOT
DRESSING REGULAR UPPER BODY CLOTHING: A LITTLE
MOVING TO AND FROM BED TO CHAIR: A LITTLE
MOBILITY SCORE: 16
DRESSING REGULAR LOWER BODY CLOTHING: A LOT
DRESSING REGULAR UPPER BODY CLOTHING: A LITTLE
STANDING UP FROM CHAIR USING ARMS: A LITTLE
PERSONAL GROOMING: A LITTLE
TURNING FROM BACK TO SIDE WHILE IN FLAT BAD: A LITTLE
CLIMB 3 TO 5 STEPS WITH RAILING: TOTAL
CLIMB 3 TO 5 STEPS WITH RAILING: TOTAL
DAILY ACTIVITIY SCORE: 17
STANDING UP FROM CHAIR USING ARMS: A LITTLE
HELP NEEDED FOR BATHING: A LITTLE

## 2024-05-19 ASSESSMENT — PAIN DESCRIPTION - LOCATION
LOCATION: ABDOMEN

## 2024-05-19 ASSESSMENT — PAIN SCALES - GENERAL
PAINLEVEL_OUTOF10: 4
PAINLEVEL_OUTOF10: 10 - WORST POSSIBLE PAIN
PAINLEVEL_OUTOF10: 9
PAINLEVEL_OUTOF10: 6
PAINLEVEL_OUTOF10: 7
PAINLEVEL_OUTOF10: 8
PAINLEVEL_OUTOF10: 7
PAINLEVEL_OUTOF10: 6
PAINLEVEL_OUTOF10: 5 - MODERATE PAIN
PAINLEVEL_OUTOF10: 8
PAINLEVEL_OUTOF10: 4
PAINLEVEL_OUTOF10: 7
PAINLEVEL_OUTOF10: 10 - WORST POSSIBLE PAIN
PAINLEVEL_OUTOF10: 9
PAIN_LEVEL: 2

## 2024-05-19 ASSESSMENT — PAIN DESCRIPTION - ORIENTATION
ORIENTATION: RIGHT
ORIENTATION: LEFT

## 2024-05-19 ASSESSMENT — PAIN - FUNCTIONAL ASSESSMENT
PAIN_FUNCTIONAL_ASSESSMENT: 0-10

## 2024-05-19 NOTE — ANESTHESIA PROCEDURE NOTES
Airway  Date/Time: 5/19/2024 10:14 AM  Urgency: elective    Airway not difficult    Staffing  Performed: CRNA   Authorized by: Yoni Valdez MD    Performed by: SANDEEP Gibbs-THI  Patient location during procedure: OR    Indications and Patient Condition  Indications for airway management: anesthesia  Spontaneous ventilation: present  Sedation level: deep  Preoxygenated: yes  Patient position: sniffing  MILS not maintained throughout  Mask difficulty assessment: 0 - not attempted    Final Airway Details  Final airway type: endotracheal airway      Successful airway: ETT  Cuffed: yes   Successful intubation technique: video laryngoscopy  Facilitating devices/methods: intubating stylet and cricoid pressure  Blade size: #3  ETT size (mm): 7.5  Cormack-Lehane Classification: grade I - full view of glottis  Placement verified by: capnometry   Cuff volume (mL): 7  Measured from: gums  ETT to gums (cm): 22  Number of attempts at approach: 1  Number of other approaches attempted: 0

## 2024-05-19 NOTE — NURSING NOTE
Pt co sob, expiratory wheezes increasing, pt states he takes albuterol inhalers at home.  Dr Multani will order

## 2024-05-19 NOTE — H&P
History Of Present Illness  Nas Jaffe is a 79 y.o. male presenting with 1 week of abdominal pain and diarrhea. He was seen in the ED last week and diagnosed with enteritis. He came back last night with persistent symptoms. He reports he is no longer having diarrhea but feels bloated.     Past Medical History  Past Medical History:   Diagnosis Date    CHF (congestive heart failure) (Multi)     Hypertension        Surgical History  No past surgical history on file.     Social History  He reports that he has never smoked. He has never used smokeless tobacco. He reports current alcohol use of about 14.0 standard drinks of alcohol per week. He reports that he does not use drugs.    Family History  No family history on file.     Allergies  Patient has no known allergies.    Review of Systems   Constitutional:  Negative for chills, fever and unexpected weight change.   HENT:  Negative for sneezing, sore throat, trouble swallowing and voice change.    Respiratory:  Negative for chest tightness and shortness of breath.    Cardiovascular:  Negative for chest pain and palpitations.   Gastrointestinal:  Positive for abdominal distention, abdominal pain and diarrhea. Negative for blood in stool, nausea and vomiting.   Endocrine: Negative for cold intolerance and heat intolerance.   Genitourinary:  Negative for decreased urine volume, dysuria and hematuria.   Musculoskeletal:  Negative for arthralgias and gait problem.   Skin:  Negative for rash and wound.   Neurological:  Negative for facial asymmetry, speech difficulty and headaches.   Hematological:  Negative for adenopathy. Does not bruise/bleed easily.   Psychiatric/Behavioral:  Negative for self-injury and suicidal ideas.         Physical Exam  Vitals and nursing note reviewed.   Constitutional:       Appearance: Normal appearance.   HENT:      Head: Normocephalic and atraumatic.      Mouth/Throat:      Mouth: Mucous membranes are moist.      Pharynx: Oropharynx is  clear.   Eyes:      Extraocular Movements: Extraocular movements intact.      Pupils: Pupils are equal, round, and reactive to light.   Cardiovascular:      Rate and Rhythm: Normal rate and regular rhythm.      Pulses: Normal pulses.   Pulmonary:      Effort: Pulmonary effort is normal.      Breath sounds: Normal breath sounds.   Abdominal:      General: There is distension.      Palpations: Abdomen is soft.      Tenderness: There is abdominal tenderness (generalized).   Musculoskeletal:      Cervical back: Normal range of motion and neck supple.   Skin:     General: Skin is warm and dry.   Neurological:      General: No focal deficit present.      Mental Status: He is alert and oriented to person, place, and time.   Psychiatric:         Mood and Affect: Mood normal.         Behavior: Behavior normal.          Last Recorded Vitals  Blood pressure 146/53, pulse 101, temperature 36.8 °C (98.2 °F), temperature source Oral, resp. rate 19, SpO2 94%.    Relevant Results  Results for orders placed or performed during the hospital encounter of 05/17/24 (from the past 24 hour(s))   CBC and Auto Differential   Result Value Ref Range    WBC 6.8 4.4 - 11.3 x10*3/uL    nRBC 0.0 0.0 - 0.0 /100 WBCs    RBC 4.63 4.50 - 5.90 x10*6/uL    Hemoglobin 14.7 13.5 - 17.5 g/dL    Hematocrit 43.6 41.0 - 52.0 %    MCV 94 80 - 100 fL    MCH 31.7 26.0 - 34.0 pg    MCHC 33.7 32.0 - 36.0 g/dL    RDW 12.7 11.5 - 14.5 %    Platelets 345 150 - 450 x10*3/uL    Immature Granulocytes %, Automated 1.5 (H) 0.0 - 0.9 %    Immature Granulocytes Absolute, Automated 0.10 0.00 - 0.50 x10*3/uL   Comprehensive metabolic panel   Result Value Ref Range    Glucose 142 (H) 65 - 99 mg/dL    Sodium 134 133 - 145 mmol/L    Potassium 3.7 3.4 - 5.1 mmol/L    Chloride 97 97 - 107 mmol/L    Bicarbonate 23 (L) 24 - 31 mmol/L    Urea Nitrogen 16 8 - 25 mg/dL    Creatinine 0.80 0.40 - 1.60 mg/dL    eGFR 90 >60 mL/min/1.73m*2    Calcium 8.3 (L) 8.5 - 10.4 mg/dL    Albumin 2.6  (L) 3.5 - 5.0 g/dL    Alkaline Phosphatase 82 35 - 125 U/L    Total Protein 5.8 (L) 5.9 - 7.9 g/dL    AST 23 5 - 40 U/L    Bilirubin, Total 0.7 0.1 - 1.2 mg/dL    ALT 27 5 - 40 U/L    Anion Gap 14 <=19 mmol/L   Lipase   Result Value Ref Range    Lipase 31 16 - 63 U/L   Manual Differential   Result Value Ref Range    Neutrophils %, Manual 48.0 40.0 - 80.0 %    Bands %, Manual 31.0 0.0 - 5.0 %    Lymphocytes %, Manual 9.0 13.0 - 44.0 %    Monocytes %, Manual 12.0 2.0 - 10.0 %    Eosinophils %, Manual 0.0 0.0 - 6.0 %    Basophils %, Manual 0.0 0.0 - 2.0 %    Seg Neutrophils Absolute, Manual 3.26 1.60 - 5.00 x10*3/uL    Bands Absolute, Manual 2.11 (H) 0.00 - 0.50 x10*3/uL    Lymphocytes Absolute, Manual 0.61 (L) 0.80 - 3.00 x10*3/uL    Monocytes Absolute, Manual 0.82 (H) 0.05 - 0.80 x10*3/uL    Eosinophils Absolute, Manual 0.00 0.00 - 0.40 x10*3/uL    Basophils Absolute, Manual 0.00 0.00 - 0.10 x10*3/uL    Total Cells Counted 100     Neutrophils Absolute, Manual 5.37 1.60 - 5.50 x10*3/uL    RBC Morphology No significant RBC morphology present    Urinalysis with Reflex Culture and Microscopic   Result Value Ref Range    Color, Urine Yellow Light-Yellow, Yellow, Dark-Yellow    Appearance, Urine Clear Clear    Specific Gravity, Urine >1.050 (N) 1.005 - 1.035    pH, Urine 6.5 5.0, 5.5, 6.0, 6.5, 7.0, 7.5, 8.0    Protein, Urine 50 (1+) (A) NEGATIVE, 10 (TRACE), 20 (TRACE) mg/dL    Glucose, Urine Normal Normal mg/dL    Blood, Urine NEGATIVE NEGATIVE    Ketones, Urine 10 (1+) (A) NEGATIVE mg/dL    Bilirubin, Urine NEGATIVE NEGATIVE    Urobilinogen, Urine 2 (1+) (A) Normal mg/dL    Nitrite, Urine NEGATIVE NEGATIVE    Leukocyte Esterase, Urine NEGATIVE NEGATIVE   Urinalysis Microscopic   Result Value Ref Range    WBC, Urine 1-5 1-5, NONE /HPF    RBC, Urine 1-2 NONE, 1-2, 3-5 /HPF    Bacteria, Urine 1+ (A) NONE SEEN /HPF    Mucus, Urine 1+ Reference range not established. /LPF     NM hepatobiliary    Result Date:  5/18/2024  Interpreted By:  Dyllan Florian, STUDY: NM HEPATOBILIARY;  5/18/2024 2:03 pm   INDICATION: Signs/Symptoms:eval for cholecystitis.   COMPARISON: CT abdomen and ultrasound gallbladder from 05/18/2024.   ACCESSION NUMBER(S): [id number]   ORDERING CLINICIAN: SHEILA AMADOR   TECHNIQUE: DIVISION OF NUCLEAR MEDICINE HEPATOBILIARY SCAN (HIDA), QUANTITATIVE   The patient received an intravenous dose of  6 mCi of Tc-99m mebrofenin (Choletec).  Sequential images of the upper abdomen were then acquired over the next 120 minutes.   FINDINGS: There is prompt accumulation of activity within the liver and normal subsequent excretion via the biliary ductal system into the small bowel. The gallbladder is not visualized up to 2 hours after radiotracer injection.       Nonvisualization of the gallbladder up to 2 hours after radiotracer injection which is in the appropriate clinical setting is compatible with cystic duct obstruction. Please note that extended fasting can have a similar appearance.   I personally reviewed the images/study and I agree with the findings as stated.  This study was interpreted at [facility], [place], [place].   MACRO: None     Signed by: Dyllan Florian 5/18/2024 2:09 PM Dictation workstation:   [id number]    US gallbladder    Result Date: 5/18/2024  Interpreted By:  Maco Nieves, STUDY: US GALLBLADDER;  5/18/2024 7:42 am   INDICATION: Signs/Symptoms:concern for cholecystitis on CT. has diffuse abd pain, vomiting. No gallstones seen on CT..   COMPARISON: None.   ACCESSION NUMBER(S): [id number]   ORDERING CLINICIAN: TOD AUGUSTE   TECHNIQUE: Multiple images of the right upper quadrant were obtained.   FINDINGS: LIVER: The liver measures 16.6 cm. Heterogenous echogenicity. No gross lesions.     GALLBLADDER: The gallbladder is nondistended, and demonstrates no evidence of gallstones, wall thickening or surrounding fluid. The gallbladder wall thickness is 1.9  mm. Sonographic Rubio's sign is negative.     BILE DUCTS: No evidence of intra or extrahepatic biliary dilatation is identified; the common bile duct measures 3.4 mm.   PANCREAS: The pancreas is poorly visualized due to overlying bowel gas.   RIGHT KIDNEY: The right kidney measures 11.7 cm in length. The renal cortical echogenicity and thickness are within normal limit.  No hydronephrosis or renal calculi are seen.       1. Slightly distended gallbladder with evidence of luminal sludge but no significant wall thickening or pericholecystic free fluid. Finding remains equivocal for acute cholecystitis and if continued clinical concern advise further assessment by HIDA scan. 2. No biliary dilatation. 3. Heterogenous hepatic echotexture could be related to underlying steatosis.   MACRO: None     Signed by: Maco Nieves 5/18/2024 8:05 AM Dictation workstation:   [id number]    CT abdomen pelvis w IV contrast    Result Date: 5/18/2024  Interpreted By:  Coty Goldsmith, STUDY: CT ABDOMEN PELVIS W IV CONTRAST;  5/17/2024 10:54 pm   INDICATION: Signs/Symptoms:left lower abd pain, nausea and vomiting.   COMPARISON: 05/12/2024   ACCESSION NUMBER(S): [id number]   ORDERING CLINICIAN: TOD AUGUSTE   TECHNIQUE: Axial CT images of the abdomen and pelvis with coronal and sagittal reconstructed images obtained after intravenous administration of contrast   FINDINGS: LOWER CHEST: No acute abnormality of the lung bases. Mild circumferential distal esophageal wall thickening. BONES: No acute osseous abnormality. Diffuse degenerative disc changes and lumbar facet arthropathy. Left total hip arthroplasty is similar appearance, with evidence of polyethylene wear and large lucencies and cortical thinning involving the acetabulum and proximal femur, consistent with particle disease. ABDOMINAL WALL: Within normal limits.   ABDOMEN:   LIVER: Within normal limits. BILE DUCTS: No biliary dilatation. GALLBLADDER: No radiopaque gallstones  are seen. However, the gallbladder is distended and there is pericholecystic fat infiltration, suspicious for acute cholecystitis. PANCREAS: Within normal limits. SPLEEN: Within normal limits. ADRENALS: Within normal limits. KIDNEYS and URETERS: Symmetric renal enhancement. No hydronephrosis or perinephric fluid collection. 4 mm nonobstructing left renal calculus.     VESSELS: No aortic aneurysm. RETROPERITONEUM: Stable 3 x 1.6 cm mass in the upper aortocaval region, morphologically appears to be an enlarged lymph node.   PELVIS:   REPRODUCTIVE ORGANS: No pelvic masses. BLADDER: Within normal limits.   BOWEL: No dilated bowel. New mild inflammatory stranding adjacent to a diverticulum in the descending colon. Normal appendix. PERITONEUM: No ascites or free air, no fluid collection.       Interval development of mild acute diverticulitis involving the descending colon. No pericolonic abscess or evidence of perforation.   High suspicion for acute cholecystitis. Further evaluation with ultrasound may be considered.   Mild circumferential distal esophageal wall thickening. Please correlate for symptoms of esophagitis.   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 Orthopedic follow-up is recommended.   MACRO: None   Signed by: Coty Goldsmith 5/18/2024 12:35 AM Dictation workstation:   [id number]        Assessment/Plan   Principal Problem:    Acute cholecystitis    79yoM with abdominal pain and diarrhea for a week. CT last week consistent with enteritis. CT last night shows stranding around gall bladder and descending colon, concern for cholecystitis and diverticulitis. NO leukocytosis, no hepatic derangements.     History and physical not consistent with acute mercedes cystitis. More likely enteritis with surrounding inflammation of gall bladder. HIDA positive, but may be false positive  2/2 minimal PO intake over the past week or morphine administration in the ED before the test.     Will keep on clears for now with IV ABX and monitor symptoms. May need surgery in the coming days if pain/n/v/d worsens.       Shabnam Multani MD

## 2024-05-19 NOTE — ANESTHESIA PREPROCEDURE EVALUATION
Patient: Nas Jaffe    Procedure Information       Date/Time: 05/19/24 0930    Procedure: Cholecystectomy Laparoscopy    Location: [facility] OR 10 / [facility] OR    Surgeons: Shabnam Multani MD            Relevant Problems   Liver   (+) Acute cholecystitis       Clinical information reviewed:    Allergies  Meds               NPO Detail:  No data recorded     Physical Exam    Airway  Mallampati: II  TM distance: >3 FB  Neck ROM: full     Cardiovascular - normal exam     Dental - normal exam     Pulmonary - normal exam     Abdominal - normal exam             Anesthesia Plan    History of general anesthesia?: yes  History of complications of general anesthesia?: no    ASA 3 - emergent     general     The patient is not a current smoker.  Patient was not previously instructed to abstain from smoking on day of procedure.  Patient did not smoke on day of procedure.  Education provided regarding risk of obstructive sleep apnea.  intravenous induction   Postoperative administration of opioids is intended.  Trial extubation is planned.  Anesthetic plan and risks discussed with patient.  Use of blood products discussed with patient who consented to blood products.    Plan discussed with CAA, attending and CRNA.

## 2024-05-19 NOTE — CARE PLAN
The patient's goals for the shift include      The clinical goals for the shift include pain management    Over the shift, the patient did not make progress toward the following goals. Barriers to progression include uncontrolled pain. Recommendations to address these barriers include continue with pain management per emar.    Problem: Pain  Goal: My pain/discomfort is manageable  Outcome: Progressing

## 2024-05-19 NOTE — NURSING NOTE
Pt assisted to stand bedside to use urinal, (2 rn max assist) pt able to void 50 ml dark tea colored urine, bladder scanned post void with 200 ml urine noted, Dr Multani updated, ok to place pablo this evening if pt unable to void

## 2024-05-19 NOTE — NURSING NOTE
"Nas medicated with toradol and zanaflex as ordered and requested   pt states  \"They lied to me\"  I asked who told him that \"the anesthesiologist  He told me that he would give me anything I wanted\"   Explained to Nas that was only in the surgery and recovery area   call light in reach   \  "

## 2024-05-19 NOTE — OP NOTE
Cholecystectomy Laparoscopy Operative Note     Date: 2024 - 2024  OR Location: [facility] OR    Name: Nas Jaffe, : 1945, Age: 79 y.o., MRN: [id number], Sex: male    Diagnosis  Pre-op Diagnosis     * Acute cholecystitis [K81.0] Post-op Diagnosis     * Acute cholecystitis [K81.0]     Procedures  Cholecystectomy Laparoscopy  38236 - NE LAPAROSCOPY SURG CHOLECYSTECTOMY      Surgeons      * Shabnam Multani - Primary    Resident/Fellow/Other Assistant:  Surgeons and Role:  * No surgeons found with a matching role *    Procedure Summary  Anesthesia: General  ASA: III  Anesthesia Staff: Anesthesiologist: Yoni Valdez MD  CRNA: SANDEEP Gibbs-CRNA  Estimated Blood Loss: 20mL  Intra-op Medications:   Administrations occurring from 0930 to 1140 on 24:   Medication Name Total Dose   lidocaine-epinephrine (Xylocaine W/EPI) 1 %-1:100,000 injection 20 mL   acetaminophen (Tylenol) tablet 650 mg Cannot be calculated   enoxaparin (Lovenox) syringe 40 mg Cannot be calculated   famotidine (Pepcid) tablet 20 mg Cannot be calculated   ipratropium-albuteroL (Duo-Neb) 0.5-2.5 mg/3 mL nebulizer solution 3 mL Cannot be calculated   ipratropium-albuteroL (Duo-Neb) 0.5-2.5 mg/3 mL nebulizer solution 3 mL Cannot be calculated   ketorolac (Toradol) injection 15 mg Cannot be calculated   ondansetron (Zofran) injection 4 mg Cannot be calculated   piperacillin-tazobactam-dextrose (Zosyn) IV 3.375 g 205 mL   sodium chloride 0.9 % bolus 500 mL Cannot be calculated   tiZANidine (Zanaflex) tablet 2 mg Cannot be calculated   traMADol (Ultram) tablet 50 mg Cannot be calculated   heparin (porcine) injection 5,000 Units 5,000 Units              Anesthesia Record               Intraprocedure I/O Totals       None           Specimen:   ID Type Source Tests Collected by Time   1 : GALLBLADDER WITH CONTENTS Tissue GALLBLADDER CHOLECYSTECTOMY SURGICAL PATHOLOGY EXAM Shabnam Multani MD 2024 1040        Staff:   Circulator: Kristen BURGER  MANDEEP Connolly  Scrub Person: Marcelino Herbert         Drains and/or Catheters:   Urethral Catheter 18 Fr. (Active)   Site Assessment Clean;Skin intact 05/19/24 0652   Collection Container Standard drainage bag 05/19/24 0652   Securement Method Securing device (Describe) 05/19/24 0652   Reason for Continuing Urinary Catheterization accurate hourly measurement of urine volume in a critically ill patient that cannot be assessed by other volumes and urine collection strategies 05/19/24 0652   Output (mL) 300 mL 05/19/24 0652         Findings: acute gangrenous cholecystitis    Indications: Nas Jaffe is an 79 y.o. male who is having surgery for Acute cholecystitis [K81.0].     The patient was seen in the preoperative area. The risks, benefits, complications, treatment options, non-operative alternatives, expected recovery and outcomes were discussed with the patient. The possibilities of reaction to medication, pulmonary aspiration, injury to surrounding structures, bleeding, recurrent infection, the need for additional procedures, failure to diagnose a condition, and creating a complication requiring transfusion or operation were discussed with the patient. The patient concurred with the proposed plan, giving informed consent.  The site of surgery was properly noted/marked if necessary per policy. The patient has been actively warmed in preoperative area. Preoperative antibiotics have been ordered and given within 1 hours of incision. Venous thrombosis prophylaxis have been ordered including bilateral sequential compression devices    Procedure Details:     The patient was placed on the operating room table in the supine position.  General anesthesia was induced.  The abdomen was prepped and draped according to standard sterile fashion.  An incision was made over the umbilicus and a Veress needle was used to gain entrance into the abdomen.  After negative aspiration and positive saline drop test, the abdomen was insufflated  to 15 mmHg.  Inspection of the abdomen to ensure that there was no iatrogenic injury was performed.  3 additional trocars were placed in the subcostal region under direct visualization after instillation of local anesthesia.  The patient was placed head up and right side up.  There was a thickened portion of omentum that was densely adherent to the gallbladder.  This was bluntly peeled down revealing a gangrenous gallbladder.  The wall was incredibly thick and tense with fluid.  An introducer needle was used to drain out about 120 cc of purulent bile.  The gallbladder was grasped and elevated cephalad and the peritoneum over top of the gallbladder was incised using the hook cautery.  As the peritoneum was opened towards the fundus of the gallbladder, it was apparent that the entire back wall was completely disintegrated.  It was a filmy layer that was able to be just peeled off of the liver.  This allowed for a top-down approach.  The critical view was then obtained using blunt dissection with the [place] to identify the duct and the artery.  The artery was clipped followed by the cystic duct and transected.  The gallbladder was then placed in an Endo Catch bag and removed from the umbilicus.  Hemostasis was ensured along the liver bed.  The patient was leveled out and the umbilical incision was closed using a figure-of-eight stitch and a suture passer.  The skin incisions were closed with Monocryl and Dermabond.  The patient tolerated the procedure well.  General anesthesia was reversed.  The patient was transferred to PACU in stable condition.      Complications:  None; patient tolerated the procedure well.    Disposition: PACU - hemodynamically stable.  Condition: stable         Shabnamvijay Devinekeenanalejandra  Phone Number: [phone]

## 2024-05-19 NOTE — SIGNIFICANT EVENT
Critical care note.  I was called by an eye team nurse and asked to evaluate the patient.  Patient is a 79-year-old white male with past medical history of obesity, CHF, hypertension who was admitted by Dr. Multani yesterday with probable acute cholecystitis.  HIDA scan was positive for acute cholecystitis but Dr. Multani's clinical impression was the clinical picture did not meet diagnosis of acute cholecystitis.  Patient is on IV Zosyn.  According to the nurses, patient looks very tired.  He is diaphoretic, somewhat confused.  They placed Coleman catheter and patient produced about 200 cc of dark brown urine during the whole shift.  Blood pressure 105/56.  Repeated systolic in 90s  On exam, elderly obese white male in some distress.  He denies any acute pain during exam.  Denies shortness of breath.  He is currently on 2 L of oxygen he was on room air before.  Face is symmetrical.  Neck is supple, no JVD.  Lungs clear diminished bilaterally.  No wheezes rales or rhonchi.  Heart: Regular tachycardic S1-S2.  Abdomen is obese, soft, mildly positive in right upper quadrant.  Bowel sounds positive in all quadrants.  No rebound.  Extremities: +2 edema in lower extremities.  Moves all extremities.  Stat EKG reviewed: Sinus tachycardia, no acute ST segment elevation depression.  Right bundle branch block pattern.  Possible old septal infarct.  Portable chest x-ray and KUB, reviewed myself:: No acute abnormalities.  CBC reviewed.  Hip patient has normal white count.  CMP is pending.  Nurses are trying to get blood to have troponin and lactate down.  Impression and plan:  Clinically, looks like sepsis with toxic metabolic encephalopathy and hypotension.  Possible source of infection is intra-abdominal, acute cholecystitis versus enteritis.  Continue IV Zosyn.  IV fluids.  CHF.  Patient has history of CHF.  [facility]  He has lower extremity edema but on exam patient does not have JVD.  He  has very small amount of concentrated urine.  I believe, patient is intravascularly depleted will continue IV hydration, will give bolus of fluid now.  Will monitor closely for evidence of decompensated.  Will check echocardiogram to know ejection fraction  Low blood pressure.  Hold antihypertensive medications.  IV fluids.  Will give bolus of 1 L.    Critical care time spent with patient 48 minutes.

## 2024-05-19 NOTE — NURSING NOTE
IV access established 2nd line   pt medicated with tramadol for pain  family brought medications in and reviewed  told family to take his meds home and we will give him them   Dtr Aishwarya beverlys understanding

## 2024-05-19 NOTE — NURSING NOTE
Assumed care of pt this evening, resting in bed, daughters at bedside, abd distended, ble edematous, iv fluids per order, pt with exp wheezes and sob with minimal exertion, call light within reach

## 2024-05-19 NOTE — NURSING NOTE
BSSR complete pt O2 4L nc  labored breathing with exp wheezing, Hiccupping and abd distended  Accompaning Nas for stat CT

## 2024-05-19 NOTE — NURSING NOTE
Phlebotomy unable to draw troponin  per pacu nurse  they were unable to obtain new IV access  Dr Multani made aware

## 2024-05-19 NOTE — NURSING NOTE
Nas back to room transferred to bed with slide board  call light in reach  IV bolus infusing without difficulty

## 2024-05-19 NOTE — NURSING NOTE
Pt confused and diaphoretic this am, hypotensive, rapid response rn at bedside, Dr. Huff at bedside, stat EKG completed, xray chest and abdomen ordered stat, labs ordered, pablo catheter inserted with 350 ml tea colored urine output

## 2024-05-19 NOTE — PROGRESS NOTES
Notified by nurse this morning that pt was diaphoretic and had decreased BP. Labs drawn, worsening kidney fxn, no leukocytosis. Stat CT ordered, I reviewed images and small and large bowel are much improved and dilation mostly resolved. The gall bladder however has more stranding and inflammation c/w acute cholecystitis. Will plan for lap mercedes today. Nursing supervisor notified and team to be called in.

## 2024-05-19 NOTE — SIGNIFICANT EVENT
0550- Called to check on patient. More diaphoretic, confused and BP is lower  Vital signs taken- see Epic charting  0600- Paged hospitalist to come and assess patient.

## 2024-05-19 NOTE — NURSING NOTE
Pts daughter franklyn called to floor to give pt medication list, daughter also wanted rn to inform Dr Multani that pt drinks at least 8 drinks per night and the last month it has been more than 8 daily. Pt has not had alcohol this week due to illness. Dr Multani updated.

## 2024-05-20 ENCOUNTER — APPOINTMENT (OUTPATIENT)
Dept: CARDIOLOGY | Facility: HOSPITAL | Age: 79
DRG: 417 | End: 2024-05-20
Payer: MEDICARE

## 2024-05-20 LAB
ALBUMIN SERPL-MCNC: 2.1 G/DL (ref 3.5–5)
ALP BLD-CCNC: 125 U/L (ref 35–125)
ALT SERPL-CCNC: 39 U/L (ref 5–40)
ANION GAP SERPL CALC-SCNC: 12 MMOL/L
AST SERPL-CCNC: 36 U/L (ref 5–40)
ATRIAL RATE: 77 BPM
BILIRUB SERPL-MCNC: 0.5 MG/DL (ref 0.1–1.2)
BUN SERPL-MCNC: 24 MG/DL (ref 8–25)
CALCIUM SERPL-MCNC: 7.5 MG/DL (ref 8.5–10.4)
CHLORIDE SERPL-SCNC: 97 MMOL/L (ref 97–107)
CO2 SERPL-SCNC: 22 MMOL/L (ref 24–31)
CREAT SERPL-MCNC: 1 MG/DL (ref 0.4–1.6)
EGFRCR SERPLBLD CKD-EPI 2021: 77 ML/MIN/1.73M*2
ERYTHROCYTE [DISTWIDTH] IN BLOOD BY AUTOMATED COUNT: 13.2 % (ref 11.5–14.5)
GLUCOSE SERPL-MCNC: 143 MG/DL (ref 65–99)
HCT VFR BLD AUTO: 36.7 % (ref 41–52)
HGB BLD-MCNC: 11.9 G/DL (ref 13.5–17.5)
MCH RBC QN AUTO: 31.6 PG (ref 26–34)
MCHC RBC AUTO-ENTMCNC: 32.4 G/DL (ref 32–36)
MCV RBC AUTO: 97 FL (ref 80–100)
NRBC BLD-RTO: 0 /100 WBCS (ref 0–0)
P AXIS: 64 DEGREES
P OFFSET: 194 MS
P ONSET: 143 MS
PLATELET # BLD AUTO: 290 X10*3/UL (ref 150–450)
POTASSIUM SERPL-SCNC: 3.8 MMOL/L (ref 3.4–5.1)
PR INTERVAL: 154 MS
PROT SERPL-MCNC: 5 G/DL (ref 5.9–7.9)
Q ONSET: 220 MS
QRS COUNT: 13 BEATS
QRS DURATION: 156 MS
QT INTERVAL: 436 MS
QTC CALCULATION(BAZETT): 493 MS
QTC FREDERICIA: 473 MS
R AXIS: 36 DEGREES
RBC # BLD AUTO: 3.77 X10*6/UL (ref 4.5–5.9)
SODIUM SERPL-SCNC: 131 MMOL/L (ref 133–145)
T AXIS: 16 DEGREES
T OFFSET: 438 MS
TROPONIN T SERPL-MCNC: 15 NG/L
VENTRICULAR RATE: 77 BPM
WBC # BLD AUTO: 8.5 X10*3/UL (ref 4.4–11.3)

## 2024-05-20 PROCEDURE — 2500000005 HC RX 250 GENERAL PHARMACY W/O HCPCS: Performed by: STUDENT IN AN ORGANIZED HEALTH CARE EDUCATION/TRAINING PROGRAM

## 2024-05-20 PROCEDURE — 94640 AIRWAY INHALATION TREATMENT: CPT

## 2024-05-20 PROCEDURE — 85027 COMPLETE CBC AUTOMATED: CPT | Performed by: STUDENT IN AN ORGANIZED HEALTH CARE EDUCATION/TRAINING PROGRAM

## 2024-05-20 PROCEDURE — 97166 OT EVAL MOD COMPLEX 45 MIN: CPT | Mod: GO

## 2024-05-20 PROCEDURE — 97161 PT EVAL LOW COMPLEX 20 MIN: CPT | Mod: GP

## 2024-05-20 PROCEDURE — 2500000002 HC RX 250 W HCPCS SELF ADMINISTERED DRUGS (ALT 637 FOR MEDICARE OP, ALT 636 FOR OP/ED): Mod: MUE | Performed by: STUDENT IN AN ORGANIZED HEALTH CARE EDUCATION/TRAINING PROGRAM

## 2024-05-20 PROCEDURE — 36415 COLL VENOUS BLD VENIPUNCTURE: CPT | Performed by: STUDENT IN AN ORGANIZED HEALTH CARE EDUCATION/TRAINING PROGRAM

## 2024-05-20 PROCEDURE — 93306 TTE W/DOPPLER COMPLETE: CPT

## 2024-05-20 PROCEDURE — 84484 ASSAY OF TROPONIN QUANT: CPT | Performed by: INTERNAL MEDICINE

## 2024-05-20 PROCEDURE — 84075 ASSAY ALKALINE PHOSPHATASE: CPT | Performed by: STUDENT IN AN ORGANIZED HEALTH CARE EDUCATION/TRAINING PROGRAM

## 2024-05-20 PROCEDURE — 2500000001 HC RX 250 WO HCPCS SELF ADMINISTERED DRUGS (ALT 637 FOR MEDICARE OP): Performed by: STUDENT IN AN ORGANIZED HEALTH CARE EDUCATION/TRAINING PROGRAM

## 2024-05-20 PROCEDURE — 94664 DEMO&/EVAL PT USE INHALER: CPT

## 2024-05-20 PROCEDURE — 2500000004 HC RX 250 GENERAL PHARMACY W/ HCPCS (ALT 636 FOR OP/ED): Performed by: STUDENT IN AN ORGANIZED HEALTH CARE EDUCATION/TRAINING PROGRAM

## 2024-05-20 PROCEDURE — 9420000001 HC RT PATIENT EDUCATION 5 MIN

## 2024-05-20 PROCEDURE — 1100000001 HC PRIVATE ROOM DAILY

## 2024-05-20 PROCEDURE — 2500000006 HC RX 250 W HCPCS SELF ADMINISTERED DRUGS (ALT 637 FOR ALL PAYERS): Performed by: STUDENT IN AN ORGANIZED HEALTH CARE EDUCATION/TRAINING PROGRAM

## 2024-05-20 RX ORDER — OXYCODONE HYDROCHLORIDE 5 MG/1
5 TABLET ORAL EVERY 6 HOURS PRN
Status: DISCONTINUED | OUTPATIENT
Start: 2024-05-20 | End: 2024-05-21 | Stop reason: HOSPADM

## 2024-05-20 RX ORDER — NYSTATIN 100000 [USP'U]/ML
5 SUSPENSION ORAL 3 TIMES DAILY
Status: DISCONTINUED | OUTPATIENT
Start: 2024-05-20 | End: 2024-05-21 | Stop reason: HOSPADM

## 2024-05-20 RX ADMIN — PIPERACILLIN SODIUM AND TAZOBACTAM SODIUM 3.38 G: 3; .375 INJECTION, SOLUTION INTRAVENOUS at 17:47

## 2024-05-20 RX ADMIN — IPRATROPIUM BROMIDE AND ALBUTEROL SULFATE 3 ML: 2.5; .5 SOLUTION RESPIRATORY (INHALATION) at 08:06

## 2024-05-20 RX ADMIN — KETOROLAC TROMETHAMINE 15 MG: 30 INJECTION, SOLUTION INTRAMUSCULAR at 17:47

## 2024-05-20 RX ADMIN — IPRATROPIUM BROMIDE AND ALBUTEROL SULFATE 3 ML: 2.5; .5 SOLUTION RESPIRATORY (INHALATION) at 11:41

## 2024-05-20 RX ADMIN — TRAMADOL HYDROCHLORIDE 50 MG: 50 TABLET ORAL at 11:31

## 2024-05-20 RX ADMIN — Medication 2 L/MIN: at 08:00

## 2024-05-20 RX ADMIN — PIPERACILLIN SODIUM AND TAZOBACTAM SODIUM 3.38 G: 3; .375 INJECTION, SOLUTION INTRAVENOUS at 23:31

## 2024-05-20 RX ADMIN — NYSTATIN 500000 UNITS: 100000 SUSPENSION ORAL at 21:00

## 2024-05-20 RX ADMIN — SODIUM CHLORIDE, SODIUM LACTATE, POTASSIUM CHLORIDE, AND CALCIUM CHLORIDE 75 ML/HR: 600; 310; 30; 20 INJECTION, SOLUTION INTRAVENOUS at 06:54

## 2024-05-20 RX ADMIN — ACETAMINOPHEN 650 MG: 325 TABLET ORAL at 17:46

## 2024-05-20 RX ADMIN — Medication 21 PERCENT: at 19:45

## 2024-05-20 RX ADMIN — FAMOTIDINE 20 MG: 20 TABLET, FILM COATED ORAL at 11:31

## 2024-05-20 RX ADMIN — PIPERACILLIN SODIUM AND TAZOBACTAM SODIUM 3.38 G: 3; .375 INJECTION, SOLUTION INTRAVENOUS at 11:32

## 2024-05-20 RX ADMIN — KETOROLAC TROMETHAMINE 15 MG: 30 INJECTION, SOLUTION INTRAMUSCULAR at 11:30

## 2024-05-20 RX ADMIN — ENOXAPARIN SODIUM 40 MG: 40 INJECTION SUBCUTANEOUS at 11:32

## 2024-05-20 RX ADMIN — TIZANIDINE 2 MG: 2 TABLET ORAL at 11:48

## 2024-05-20 RX ADMIN — IPRATROPIUM BROMIDE AND ALBUTEROL SULFATE 3 ML: 2.5; .5 SOLUTION RESPIRATORY (INHALATION) at 19:45

## 2024-05-20 RX ADMIN — PIPERACILLIN SODIUM AND TAZOBACTAM SODIUM 3.38 G: 3; .375 INJECTION, SOLUTION INTRAVENOUS at 03:13

## 2024-05-20 RX ADMIN — ACETAMINOPHEN 650 MG: 325 TABLET ORAL at 11:31

## 2024-05-20 RX ADMIN — ACETAMINOPHEN 650 MG: 325 TABLET ORAL at 03:14

## 2024-05-20 RX ADMIN — TRAMADOL HYDROCHLORIDE 50 MG: 50 TABLET ORAL at 03:13

## 2024-05-20 RX ADMIN — OXYCODONE 5 MG: 5 TABLET ORAL at 17:46

## 2024-05-20 RX ADMIN — IPRATROPIUM BROMIDE AND ALBUTEROL SULFATE 3 ML: 2.5; .5 SOLUTION RESPIRATORY (INHALATION) at 04:10

## 2024-05-20 SDOH — ECONOMIC STABILITY: INCOME INSECURITY: IN THE PAST 12 MONTHS, HAS THE ELECTRIC, GAS, OIL, OR WATER COMPANY THREATENED TO SHUT OFF SERVICE IN YOUR HOME?: NO

## 2024-05-20 SDOH — HEALTH STABILITY: PHYSICAL HEALTH: ON AVERAGE, HOW MANY MINUTES DO YOU ENGAGE IN EXERCISE AT THIS LEVEL?: 0 MIN

## 2024-05-20 SDOH — SOCIAL STABILITY: SOCIAL INSECURITY: WITHIN THE LAST YEAR, HAVE YOU BEEN HUMILIATED OR EMOTIONALLY ABUSED IN OTHER WAYS BY YOUR PARTNER OR EX-PARTNER?: NO

## 2024-05-20 SDOH — SOCIAL STABILITY: SOCIAL NETWORK: HOW OFTEN DO YOU ATTENT MEETINGS OF THE CLUB OR ORGANIZATION YOU BELONG TO?: NEVER

## 2024-05-20 SDOH — HEALTH STABILITY: MENTAL HEALTH: HOW MANY STANDARD DRINKS CONTAINING ALCOHOL DO YOU HAVE ON A TYPICAL DAY?: 5 OR 6

## 2024-05-20 SDOH — SOCIAL STABILITY: SOCIAL NETWORK: ARE YOU MARRIED, WIDOWED, DIVORCED, SEPARATED, NEVER MARRIED, OR LIVING WITH A PARTNER?: WIDOWED

## 2024-05-20 SDOH — SOCIAL STABILITY: SOCIAL NETWORK: HOW OFTEN DO YOU ATTEND CHURCH OR RELIGIOUS SERVICES?: NEVER

## 2024-05-20 SDOH — HEALTH STABILITY: PHYSICAL HEALTH: ON AVERAGE, HOW MANY DAYS PER WEEK DO YOU ENGAGE IN MODERATE TO STRENUOUS EXERCISE (LIKE A BRISK WALK)?: 0 DAYS

## 2024-05-20 SDOH — HEALTH STABILITY: MENTAL HEALTH: HOW OFTEN DO YOU HAVE 6 OR MORE DRINKS ON ONE OCCASION?: DAILY OR ALMOST DAILY

## 2024-05-20 SDOH — ECONOMIC STABILITY: FOOD INSECURITY: WITHIN THE PAST 12 MONTHS, THE FOOD YOU BOUGHT JUST DIDN'T LAST AND YOU DIDN'T HAVE MONEY TO GET MORE.: NEVER TRUE

## 2024-05-20 SDOH — SOCIAL STABILITY: SOCIAL NETWORK: HOW OFTEN DO YOU GET TOGETHER WITH FRIENDS OR RELATIVES?: ONCE A WEEK

## 2024-05-20 SDOH — ECONOMIC STABILITY: FOOD INSECURITY: WITHIN THE PAST 12 MONTHS, YOU WORRIED THAT YOUR FOOD WOULD RUN OUT BEFORE YOU GOT MONEY TO BUY MORE.: NEVER TRUE

## 2024-05-20 SDOH — HEALTH STABILITY: MENTAL HEALTH: HOW OFTEN DO YOU HAVE A DRINK CONTAINING ALCOHOL?: 4 OR MORE TIMES A WEEK

## 2024-05-20 SDOH — ECONOMIC STABILITY: INCOME INSECURITY: IN THE LAST 12 MONTHS, WAS THERE A TIME WHEN YOU WERE NOT ABLE TO PAY THE MORTGAGE OR RENT ON TIME?: NO

## 2024-05-20 SDOH — ECONOMIC STABILITY: HOUSING INSECURITY: IN THE LAST 12 MONTHS, HOW MANY PLACES HAVE YOU LIVED?: 1

## 2024-05-20 SDOH — ECONOMIC STABILITY: INCOME INSECURITY: HOW HARD IS IT FOR YOU TO PAY FOR THE VERY BASICS LIKE FOOD, HOUSING, MEDICAL CARE, AND HEATING?: NOT HARD AT ALL

## 2024-05-20 SDOH — SOCIAL STABILITY: SOCIAL INSECURITY: WITHIN THE LAST YEAR, HAVE YOU BEEN AFRAID OF YOUR PARTNER OR EX-PARTNER?: NO

## 2024-05-20 SDOH — HEALTH STABILITY: MENTAL HEALTH
HOW OFTEN DO YOU NEED TO HAVE SOMEONE HELP YOU WHEN YOU READ INSTRUCTIONS, PAMPHLETS, OR OTHER WRITTEN MATERIAL FROM YOUR DOCTOR OR PHARMACY?: RARELY

## 2024-05-20 SDOH — SOCIAL STABILITY: SOCIAL NETWORK: IN A TYPICAL WEEK, HOW MANY TIMES DO YOU TALK ON THE PHONE WITH FAMILY, FRIENDS, OR NEIGHBORS?: ONCE A WEEK

## 2024-05-20 ASSESSMENT — COGNITIVE AND FUNCTIONAL STATUS - GENERAL
TOILETING: A LOT
MOBILITY SCORE: 7
MOVING TO AND FROM BED TO CHAIR: A LITTLE
DRESSING REGULAR LOWER BODY CLOTHING: A LOT
WALKING IN HOSPITAL ROOM: A LITTLE
DAILY ACTIVITIY SCORE: 15
PERSONAL GROOMING: A LITTLE
DAILY ACTIVITIY SCORE: 17
DRESSING REGULAR UPPER BODY CLOTHING: A LITTLE
EATING MEALS: A LITTLE
DRESSING REGULAR UPPER BODY CLOTHING: A LITTLE
WALKING IN HOSPITAL ROOM: A LITTLE
STANDING UP FROM CHAIR USING ARMS: A LITTLE
DRESSING REGULAR UPPER BODY CLOTHING: A LITTLE
DRESSING REGULAR LOWER BODY CLOTHING: A LOT
CLIMB 3 TO 5 STEPS WITH RAILING: TOTAL
TOILETING: A LOT
MOVING TO AND FROM BED TO CHAIR: A LITTLE
HELP NEEDED FOR BATHING: A LOT
TURNING FROM BACK TO SIDE WHILE IN FLAT BAD: A LITTLE
TURNING FROM BACK TO SIDE WHILE IN FLAT BAD: TOTAL
TOILETING: A LOT
CLIMB 3 TO 5 STEPS WITH RAILING: A LOT
STANDING UP FROM CHAIR USING ARMS: TOTAL
PERSONAL GROOMING: A LITTLE
WALKING IN HOSPITAL ROOM: TOTAL
MOBILITY SCORE: 18
PERSONAL GROOMING: A LITTLE
STANDING UP FROM CHAIR USING ARMS: A LITTLE
HELP NEEDED FOR BATHING: A LITTLE
HELP NEEDED FOR BATHING: A LITTLE
DRESSING REGULAR LOWER BODY CLOTHING: A LOT
MOBILITY SCORE: 18
TURNING FROM BACK TO SIDE WHILE IN FLAT BAD: A LITTLE
DAILY ACTIVITIY SCORE: 17
CLIMB 3 TO 5 STEPS WITH RAILING: A LOT
MOVING FROM LYING ON BACK TO SITTING ON SIDE OF FLAT BED WITH BEDRAILS: A LOT
MOVING TO AND FROM BED TO CHAIR: TOTAL

## 2024-05-20 ASSESSMENT — PAIN DESCRIPTION - LOCATION
LOCATION: ABDOMEN

## 2024-05-20 ASSESSMENT — PAIN SCALES - GENERAL
PAINLEVEL_OUTOF10: 7
PAINLEVEL_OUTOF10: 7
PAINLEVEL_OUTOF10: 3
PAINLEVEL_OUTOF10: 7
PAINLEVEL_OUTOF10: 2

## 2024-05-20 ASSESSMENT — PAIN - FUNCTIONAL ASSESSMENT
PAIN_FUNCTIONAL_ASSESSMENT: 0-10

## 2024-05-20 ASSESSMENT — ACTIVITIES OF DAILY LIVING (ADL)
BATHING_ASSISTANCE: MODERATE
ADL_ASSISTANCE: INDEPENDENT
ADL_ASSISTANCE: INDEPENDENT

## 2024-05-20 ASSESSMENT — LIFESTYLE VARIABLES
SKIP TO QUESTIONS 9-10: 0
AUDIT-C TOTAL SCORE: 10

## 2024-05-20 ASSESSMENT — PAIN DESCRIPTION - ORIENTATION: ORIENTATION: RIGHT

## 2024-05-20 ASSESSMENT — PAIN SCALES - PAIN ASSESSMENT IN ADVANCED DEMENTIA (PAINAD): TOTALSCORE: MEDICATION (SEE MAR)

## 2024-05-20 NOTE — PROGRESS NOTES
"Nas Jaffe is a 79 year old male admitted with acute cholecystectomy.       05/20/24 1511   Current Planned Discharge Disposition   Current Planned Discharge Disposition SNF     Patient lives with his daughter in a multi-level house. His bedroom is on the second level.   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 States the back pain causes leg pain/weakness as well. States he does everything very slowly. He is dependent on his cane. He is independent with ADLs, drives, can  groceries. No tobacco use, he is a daily alcohol drinker. [facility].   ADOD 05/23/2024  Patient wants to go to SNF. List provided. Patient states he will go \"anywhere close by\" but, \"no [facility] [place]\".   PT notes are still needed, will start referral once all of the clinicals are available. Patient has traditional Medicare and tonight will be his third midnight. No precert needed.     Anabella Matias RN-BSN  "

## 2024-05-20 NOTE — PROGRESS NOTES
Occupational Therapy    Evaluation    Patient Name: Nas Jaffe  MRN: [id number]  : 1945  Today's Date: 24  Time Calculation  Start Time: 1355  Stop Time: 1415  Time Calculation (min): 20 min       Assessment:  OT Assessment: Pt would benefit from acute OT services to address deficits in ADLs/IADLs, functional mobility, and transfers  End of Session Communication: Bedside nurse  End of Session Patient Position: Bed, 1 rail up, Alarm off, not on at start of session (all needs in reach)  OT Assessment Results: Decreased ADL status, Decreased endurance, Decreased functional mobility, Decreased IADLs  Plan:  Treatment Interventions: ADL retraining, Functional transfer training, Endurance training, Patient/family training, Equipment evaluation/education  OT Frequency: 5 times per week  OT Discharge Recommendations: Moderate intensity level of continued care  OT - OK to Discharge: Yes  Treatment Interventions: ADL retraining, Functional transfer training, Endurance training, Patient/family training, Equipment evaluation/education    Subjective   Current Problem:  1. Acute cholecystitis  Case Request Operating Room: Cholecystectomy Laparoscopy    Case Request Operating Room: Cholecystectomy Laparoscopy    Surgical Pathology Exam    Surgical Pathology Exam      2. Chronic congestive heart failure, unspecified heart failure type (Multi)  Transthoracic Echo (TTE) Complete    Transthoracic Echo (TTE) Complete      3. CHF (congestive heart failure), NYHA class I, acute on chronic, combined (Multi)  Transthoracic Echo (TTE) Complete    Transthoracic Echo (TTE) Complete        General:   OT Received On: 24  General  Reason for Referral: 79 year old admitted with acute cholecysititis 24 Laparoscopy cholecystectomy  Past Medical History Relevant to Rehab: CHF, HTN, obesity, JESENIA, chronic back pain  Family/Caregiver Present: No  Co-Treatment: PT  Prior to Session Communication: Bedside nurse  Patient  Position Received: Bed, 1 rail up, Alarm off, not on at start of session  General Comment: Cleared by nursing. Pt agreeable to therapy  Precautions:  Medical Precautions: Fall precautions  Post-Surgical Precautions: Abdominal surgery precautions  Vital Signs:     Pain:  Pain Assessment  Pain Assessment: 0-10  Pain Score: 7  Pain Type: Acute pain, Surgical pain  Pain Location: Abdomen    Objective   Cognition:  Overall Cognitive Status: Within Functional Limits           Home Living:  Type of Home: House  Lives With: Adult children (daughter)  Home Adaptive Equipment: Cane, Wheelchair-manual, Walker rolling or standard  Home Layout: Multi-level, Laundry in basement, Bed/bath upstairs  Home Access: Stairs to enter without rails  Entrance Stairs-Rails: None  Entrance Stairs-Number of Steps: 3  Bathroom Shower/Tub: Tub/shower unit  Bathroom Toilet: Standard  Bathroom Equipment: Grab bars around toilet  Home Living Comments: flight to bed/bathroom on second level and shower in basement  Prior Function:  Level of Rich: Independent with ADLs and functional transfers  Receives Help From:  (daughter)  ADL Assistance: Independent  Homemaking Assistance:  (shares with dtr for cooking, cleaning, orders groceries online and picks up)  Ambulatory Assistance: Independent (mod I with use of cane)  Prior Function Comments: +drives, daughter assists with cooking/cleaning. Daughter works full time out of the house.       ADL:  Eating Assistance: Independent  Grooming Assistance:  (set up)  Bathing Assistance: Moderate  UE Dressing Assistance: Minimal  LE Dressing Assistance: Moderate  Toileting Assistance with Device: Maximal    Activity Tolerance:  Endurance: Decreased tolerance for upright activites  Functional Standing Tolerance:     Bed Mobility/Transfers: Bed Mobility  Bed Mobility:  (mod A x2 for trunk control and assist with BLEs supine<>seated EOB, increased effort to perform)    Transfers  Transfer:  (min A for  balance and controlled descent sit<>stand bed level, VCs for safe hand and leg placement)    Functional Mobility:  Functional Mobility  Functional Mobility Performed:  (min A x2 for functional mobility~3-4 side steps with use of RW, pt unsteady this date)  Sitting Balance:  Static Sitting Balance  Static Sitting-Level of Assistance: Close supervision    Sensation:  Sensation Comment: pt reports intermittent paresthesia BUEs  Strength:  Strength Comments: WFL during functional activity      Hand Function:  Hand Function  Gross Grasp: Functional  Coordination: Functional  Extremities: RUE   RUE : Within Functional Limits and LUE   LUE: Within Functional Limits    Outcome Measures: [facility] Daily Activity  Putting on and taking off regular lower body clothing: A lot  Bathing (including washing, rinsing, drying): A lot  Putting on and taking off regular upper body clothing: A little  Toileting, which includes using toilet, bedpan or urinal: A lot  Taking care of personal grooming such as brushing teeth: A little  Eating Meals: A little  Daily Activity - Total Score: 15    Education Documentation  Body Mechanics, taught by Joana Soares OT at 5/20/2024  2:58 PM.  Learner: Patient  Readiness: Acceptance  Method: Explanation  Response: Verbalizes Understanding, Needs Reinforcement    Precautions, taught by Joana Soares OT at 5/20/2024  2:58 PM.  Learner: Patient  Readiness: Acceptance  Method: Explanation  Response: Verbalizes Understanding, Needs Reinforcement    ADL Training, taught by Joana Soares OT at 5/20/2024  2:58 PM.  Learner: Patient  Readiness: Acceptance  Method: Explanation  Response: Verbalizes Understanding, Needs Reinforcement    Education Comments  No comments found.    Goals:  Encounter Problems       Encounter Problems (Active)       ADLs       Pt will complete ADL tasks at mod I with use of AE prn  (Progressing)       Start:  05/20/24    Expected End:  06/10/24                Functional Mobility       Pt will perform functional mobility household distances at mod I with use of RW.    (Progressing)       Start:  05/20/24    Expected End:  06/10/24               Instrumental Activities of Daily Living       Pt will perform simple IADLs/retrieval of items at mod I (Progressing)       Start:  05/20/24    Expected End:  06/10/24               OT Transfers       Pt will perform functional transfers at mod I (Progressing)       Start:  05/20/24    Expected End:  06/10/24

## 2024-05-20 NOTE — PROGRESS NOTES
05/20/24 1505   Physical Activity   On average, how many days per week do you engage in moderate to strenuous exercise (like a brisk walk)? 0 days   On average, how many minutes do you engage in exercise at this level? 0 min   Financial Resource Strain   How hard is it for you to pay for the very basics like food, housing, medical care, and heating? Not hard   Housing Stability   In the last 12 months, was there a time when you were not able to pay the mortgage or rent on time? N   In the last 12 months, how many places have you lived? 1   Transportation Needs   In the past 12 months, has lack of transportation kept you from medical appointments or from getting medications? no   In the past 12 months, has lack of transportation kept you from meetings, work, or from getting things needed for daily living? No   Food Insecurity   Within the past 12 months, you worried that your food would run out before you got the money to buy more. Never true   Within the past 12 months, the food you bought just didn't last and you didn't have money to get more. Never true   Stress   Do you feel stress - tense, restless, nervous, or anxious, or unable to sleep at night because your mind is troubled all the time - these days? Only a littl   Social Connections   In a typical week, how many times do you talk on the phone with family, friends, or neighbors? Once a week   How often do you get together with friends or relatives? Once   How often do you attend Muslim or Hoahaoism services? Never   Do you belong to any clubs or organizations such as Muslim groups, unions, fraternal or athletic groups, or school groups? No   How often do you attend meetings of the clubs or organizations you belong to? Never   Are you , , , , never , or living with a partner?    Intimate Partner Violence   Within the last year, have you been afraid of your partner or ex-partner? No   Within the last year, have  you been humiliated or emotionally abused in other ways by your partner or ex-partner? No   Within the last year, have you been kicked, hit, slapped, or otherwise physically hurt by your partner or ex-partner? No   Within the last year, have you been raped or forced to have any kind of sexual activity by your partner or ex-partner? No   Alcohol Use   Q1: How often do you have a drink containing alcohol? 4 or more ti   Q2: How many drinks containing alcohol do you have on a typical day when you are drinking? 5 or 6   Q3: How often do you have six or more drinks on one occasion? Daily   Utilities   In the past 12 months has the electric, gas, oil, or water company threatened to shut off services in your home? No   Health Literacy   How often do you need to have someone help you when you read instructions, pamphlets, or other written material from your doctor or pharmacy? Rarely

## 2024-05-20 NOTE — NURSING NOTE
"Sergio up to chair with assist \"I can't believe how much muscle I have lost in just a couple days\"   Aerosol treatment in progress  Pt medicated with toradol, tramadol, zanaflex and scheduled tylenol   call light in reach   "

## 2024-05-20 NOTE — CARE PLAN
Problem: PT Goals  Goal: Patient will transfer supine to sit and sit to supine with supervision assist to facilitate mobility.   Outcome: Progressing  Goal: Patient will transfer bed to chair and chair to bed with supervision assist to facilitate mobility.   Outcome: Progressing  Goal: Patient will amb 100 feet with rolling walker device including two turns on even surface with supervision assist to facilitate safe mobility.   Outcome: Progressing  Goal: Patient will increase BLE strength to 3+/5 to improve functional mobility.     Outcome: Progressing

## 2024-05-20 NOTE — PROGRESS NOTES
Nas Jaffe is a 79 y.o. male on day 2 of admission presenting with Acute cholecystitis.    Subjective   Patient trying to sleep upon entering room. States he cannot get sleep in the hospital because people keep coming in. Notes pain, states he has been in pain for 50 years, demerol is the only thing that takes pain away. Notes some RLQ abd pain with coughing. Notes abdomen is slightly larger and firmer than normal. States has difficulty walking due to arthritis in spine causing legs to give out. Notes hiccups are coming back. No BM since surgery, though states he hasn't eaten much. Has been passing gas.        Objective     Physical Exam  Constitutional:       Appearance: He is obese.   HENT:      Head: Normocephalic and atraumatic.      Nose: Nose normal.      Mouth/Throat:      Mouth: Mucous membranes are moist.   Cardiovascular:      Rate and Rhythm: Normal rate.   Pulmonary:      Effort: Pulmonary effort is normal. No respiratory distress.   Abdominal:      General: There is distension.      Tenderness: There is abdominal tenderness. There is no guarding.   Musculoskeletal:         General: Normal range of motion.      Cervical back: Normal range of motion.   Skin:     General: Skin is warm and dry.      Comments: Surgical incisions CDI   Neurological:      General: No focal deficit present.      Mental Status: He is alert and oriented to person, place, and time.   Psychiatric:         Behavior: Behavior normal.         Last Recorded Vitals  Blood pressure 124/61, pulse 76, temperature 36.3 °C (97.3 °F), temperature source Axillary, resp. rate 15, SpO2 97%.  Intake/Output last 3 Shifts:  I/O last 3 completed shifts:  In: 3285 [P.O.:1090; I.V.:245; IV Piggyback:1950]  Out: 1885 [Urine:1810; Blood:75]    Relevant Results             Lab Results   Component Value Date    WBC 8.5 05/20/2024    HGB 11.9 (L) 05/20/2024    HCT 36.7 (L) 05/20/2024    MCV 97 05/20/2024     05/20/2024     Lab Results    Component Value Date    GLUCOSE 143 (H) 05/20/2024    CALCIUM 7.5 (L) 05/20/2024     (L) 05/20/2024    K 3.8 05/20/2024    CO2 22 (L) 05/20/2024    CL 97 05/20/2024    BUN 24 05/20/2024    CREATININE 1.00 05/20/2024          This patient has a urinary catheter   Reason for the urinary catheter remaining today? Urine catheter unnecessary, will be removed today               Assessment/Plan   Principal Problem:    Acute cholecystitis    5/20: POD 1 cholecystectomy. Patient likely with small postoperative ileus PT/OT ordered, discussed importance of ambulation. Will advance to full liquid diet. Awaiting BM, if no progress tomorrow, will give MOM. Coleman catheter to be removed today.        I spent 15 minutes in the professional and overall care of this patient.      Renee Elizabeth PA-C

## 2024-05-20 NOTE — NURSING NOTE
Assumed care of pt this evening, laying in bed, lap sites with dermabond intact, pt co pain 10/10 to abd, pt is unable to get next dose of pain medicatin until 1010, Dr Multani notified, ok to give pt toradol early per dr Bedoya

## 2024-05-20 NOTE — PROGRESS NOTES
05/20/24 1511   [facility] Disability Status   Are you deaf or do you have serious difficulty hearing? N   Are you blind or do you have serious difficulty seeing, even when wearing glasses? N   Because of a physical, mental, or emotional condition, do you have serious difficulty concentrating, remembering, or making decisions? (5 years old or older) N   Do you have serious difficulty walking or climbing stairs? Y   Do you have serious difficulty dressing or bathing? Y   Because of a physical, mental, or emotional condition, do you have serious difficulty doing errands alone such as visiting the doctor? Y

## 2024-05-20 NOTE — PROGRESS NOTES
Nas Jaffe is a 79 y.o. male on day 2 of admission presenting with Acute cholecystitis.    Referrals have been sent to [facility] [place], [place] and [facility]. Will follow for updates.       Anabella Matias RN    UPDATE: [facility] has accepted patient.

## 2024-05-20 NOTE — DOCUMENTATION CLARIFICATION NOTE
"    PATIENT:               DESHAWN SMITH  ACCT #:                  [id number]  MRN:                       [id number]  :                       1945  ADMIT DATE:       2024 4:49 PM  DISCH DATE:  RESPONDING PROVIDER #:        [id number]          PROVIDER RESPONSE TEXT:    Sepsis was a differential diagnosis and ruled out after study    CDI QUERY TEXT:    Clarification        Instruction:  Based on your assessment of the patient and the clinical information, please provide the requested documentation by clicking on the appropriate radio button and enter any additional information if prompted.    Question: Sepsis was documented in the medical record. Based on the documentation and the clinical information, can the diagnosis be further clarified as    When answering this query, please exercise your independent professional judgment. The fact that a question is being asked, does not imply that any particular answer is desired or expected.    The patient's clinical indicators include:  Clinical Information: 79 y.o. male presenting with 1 week of abdominal pain and diarrhea. He was seen in the ED last week and diagnosed with enteritis. He came back last night with persistent symptoms. He reports he is no longer having diarrhea but feels bloated.    Documented Diagnosis: Sepsis    Clinical Indicators:  -Vital Signs:  -WBC: 7.2, 8.2  -Microbiology Results: n/a  -Band Neutrophil Count/percent Band Neutrophil: n/a  -Lactic acid: 1.0  -BUN/Creat: 25/1.2  -Blood cultures: n/a  -Bilirubin: n/a  -MAP: 59  -[place] Coma Scale: n/a  -PAO2/FIO2: n/a  -Procalcitonin: n/a  -Platelets: 273  -Other clinical indicators:    Criteria: Tachycardia, Tachypnea, hypotension, Organ dysfunction: Encephalopathy    : Dr Huff: \"Clinically, looks like sepsis with toxic metabolic encephalopathy and hypotension.  Possible source of infection is intra-abdominal, acute cholecystitis versus enteritis.  Continue IV Zosyn.  IV " "fluids.\"    Treatment: IV antibiotics Zosyn, IV fluid resuscitation    Risk Factors: acute cholecystitis, Hx: HTN, CHF  Options provided:  -- Sepsis was a differential diagnosis and ruled out after study  -- Sepsis with neurological organ dysfunction of Toxic metabolic encephalopathy  -- Sepsis with other organ dysfunction, Please specify sepsis associated organ dysfunction below  -- Other - I will add my own diagnosis  -- Refer to Clinical Documentation Reviewer    Query created by: Marcelo Arnold on 5/20/2024 9:20 AM      Electronically signed by:  KAMARI ELIAS MD 5/20/2024 11:49 AM          "

## 2024-05-20 NOTE — NURSING NOTE
This nurse received call from June from Lab reported patient had a Troponin of  15. Nurse sent provider a notification.

## 2024-05-20 NOTE — NURSING NOTE
Sergio stand at side of bed for void 300ml  then to chair with supervision call light in reach  pt voices understanding to use call light to assist with getting back to bed

## 2024-05-20 NOTE — PROGRESS NOTES
05/20/24 1509   Discharge Planning   Living Arrangements Children  (Lives with daughter Aishwarya)   Support Systems Children   Assistance Needed PT/OT/SNF   Type of Residence Private residence   Number of Stairs to Enter Residence 4   Number of Stairs Within Residence 1  (flight to upstairs)   Do you have animals or pets at home? Yes   Type of Animals or Pets cats   Who is requesting discharge planning? Provider   Home or Post Acute Services Post acute facilities (Rehab/SNF/etc)   Type of Post Acute Facility Services Rehab   Patient expects to be discharged to: SNF   Does the patient need discharge transport arranged? Yes   RoundTrip coordination needed? Yes   Has discharge transport been arranged? No   What day is the transport expected? 05/23/24   What time is the transport expected? 1600   Patient Choice   Provider Choice list and CMS website (https://medicare.gov/care-compare#search) for post-acute Quality and Resource Measure Data were provided and reviewed with: Patient   Patient / Family choosing to utilize agency / facility established prior to hospitalization No

## 2024-05-20 NOTE — PROGRESS NOTES
Physical Therapy    Physical Therapy Evaluation    Patient Name: Nas Jaffe  MRN: [id number]  Today's Date: 5/20/2024   Time Calculation  Start Time: 1354  Stop Time: 1414  Time Calculation (min): 20 min    Assessment/Plan   PT Assessment  PT Assessment Results: Decreased strength, Decreased endurance, Impaired balance, Decreased range of motion, Decreased mobility, Impaired judgement, Decreased safety awareness, Impaired sensation, Obesity  Rehab Prognosis: Good  Barriers to Discharge: medical condition  Evaluation/Treatment Tolerance: Patient limited by fatigue, Patient limited by pain  Medical Staff Made Aware: Yes  Strengths: Ability to acquire knowledge, Access to adaptive/assistive products, Support of Caregivers  Barriers to Participation: Premorbid level of function  End of Session Communication: Bedside nurse  Assessment Comment: 79 year old male admitted with acute cholecystitis and has undergone laparoscopy surgical cholecystectomy who requires  assist x 2 for basic mobility at [facility]. Patient presents with B LE weakness, impaired functional activity tolerance, and impaired  balance. Patient is at high risk for falls and has suffered a functional decline. Charlene is in multilevel home iwth support of family. Patient requires moderate intensity rehab follow up.  End of Session Patient Position: Bed, 2 rail up, Alarm on  IP OR SWING BED PT PLAN  Inpatient or Swing Bed: Inpatient  PT Plan  Treatment/Interventions: Bed mobility, Transfer training, Gait training, Balance training, Strengthening, Endurance training, Therapeutic exercise, Therapeutic activity, Postural re-education  PT Plan: Skilled PT  PT Frequency: 5 times per week  PT Discharge Recommendations: Moderate intensity level of continued care  Equipment Recommended upon Discharge: Wheeled walker  PT Recommended Transfer Status: Assist x2  PT - OK to Discharge: Yes      Subjective   General Visit Information:  General  Reason for Referral: impaired  mobility with acute cholecystitis 5/18/24 laparoscopy cholecystectomy  Referred By: Dr. Multani  Past Medical History Relevant to Rehab: CHF, HTN, obesity, JESENIA, chronic back pain  Prior to Session Communication: Bedside nurse  Patient Position Received: Bed, 3 rail up, Alarm off, not on at start of session  Preferred Learning Style: auditory, verbal  General Comment: 79 year old admitted with acute cholecysititis 5/18/24 Laparoscopy cholecystectomy  Home Living:  Home Living  Type of Home: House  Lives With: Adult children (daughter)  Home Adaptive Equipment: Cane, Wheelchair-manual, Walker rolling or standard  Home Layout: Two level  Home Access: Stairs to enter without rails  Entrance Stairs-Rails: None  Entrance Stairs-Number of Steps: 3  Bathroom Shower/Tub: Tub/shower unit  Bathroom Toilet: Standard  Bathroom Equipment: Other (Comment) (grab bars by toilet)  Home Living Comments: steps to second floor with small landing as it turns to go around corner  Prior Level of Function:  Prior Function Per Pt/Caregiver Report  Level of [place]: Independent with ADLs and functional transfers  Receives Help From: Other (Comment) (daughter)  ADL Assistance: Independent  Homemaking Assistance: Independent (assist for cooking, cleaning, orders groceries online and picks up)  Ambulatory Assistance: Independent  Prior Function Comments: +drives, daughter assists with cooking/cleaning. Daughter works full time out of the house.  Precautions:  Precautions  Hearing/Visual Limitations: none  Medical Precautions: Fall precautions  Post-Surgical Precautions: Other (comment) (abdominal precautions to reduce pain with movement)  Vital Signs:       Objective   Pain:  Pain Assessment  Pain Assessment: 0-10  Pain Score: 7  Pain Type: Acute pain, Surgical pain  Pain Location: Abdomen  Cognition:  Cognition  Orientation Level: Oriented X4    General Assessments:  General Observation  General Observation: morbid obesity, forward flexed                Activity Tolerance  Endurance: Decreased tolerance for upright activites    Sensation  Light Touch: Partial deficits in the RUE, Partial deficits in the LUE, Partial deficits in the RLE, Partial deficits in the LLE (reports paresthesias BUE and B LE)    Strength  Strength Comments: B LE grossly 2+/5 hip, knee  Postural Control  Postural Control: Impaired  Posture Comment: forward head, rounded shoulders, forward flexed    Static Sitting Balance  Static Sitting-Balance Support: Bilateral upper extremity supported, Feet supported  Static Sitting-Level of Assistance: Close supervision    Static Standing Balance  Static Standing-Balance Support: Bilateral upper extremity supported  Static Standing-Level of Assistance: Minimum assistance  Static Standing-Comment/Number of Minutes: min A x 2 with B UE support on RW  Dynamic Standing Balance  Dynamic Standing-Balance Support: Bilateral upper extremity supported  Dynamic Standing-Comments: min A x 2 with B UE support on RW  Functional Assessments:  Bed Mobility  Bed Mobility: Yes  Bed Mobility 1  Bed Mobility 1: Supine to sitting, Sitting to supine  Level of Assistance 1: Moderate assistance, +2  Bed Mobility Comments 1: cues for logroll technique for pain manangement    Transfers  Transfer: Yes  Transfer 1  Transfer From 1: Bed to  Transfer to 1: Stand  Technique 1: Sit to stand, Stand to sit  Transfer Device 1: Walker  Transfer Level of Assistance 1: Minimum assistance  Trials/Comments 1: pulls up on walker with 1 arm and pushes up    Ambulation/Gait Training  Ambulation/Gait Training Performed: Yes  Ambulation/Gait Training 1  Surface 1: Level tile  Device 1: Rolling walker  Assistance 1: Minimum assistance (x 2)  Quality of Gait 1: Wide base of support, Soft knee(s), Inconsistent stride length, Decreased step length  Comments/Distance (ft) 1: Patient takes 3 steps forward and backwards and then 4 steps up along side of bed. B knee weakness noted with L knee  weakness >R. Fatigues with activity.  Extremity/Trunk Assessments:     RLE   RLE : Exceptions to WFL (2+/5 hip, knee, ankle. ROM limited by patient girth and weakness.)  LLE   LLE : Exceptions to WFL (2+/5 hip, knee, ankle. ROM limited by patient girth and weakness.)  Outcome Measures:  [facility] Basic Mobility  Turning from your back to your side while in a flat bed without using bedrails: A lot  Moving from lying on your back to sitting on the side of a flat bed without using bedrails: Total  Moving to and from bed to chair (including a wheelchair): Total  Standing up from a chair using your arms (e.g. wheelchair or bedside chair): Total  To walk in hospital room: Total  Climbing 3-5 steps with railing: Total  Basic Mobility - Total Score: 7    Encounter Problems       Encounter Problems (Active)       PT Goals       Patient will transfer supine to sit and sit to supine with supervision assist to facilitate mobility.  (Progressing)       Start:  05/20/24    Expected End:  06/03/24            Patient will transfer bed to chair and chair to bed with supervision assist to facilitate mobility.  (Progressing)       Start:  05/20/24    Expected End:  06/03/24            Patient will amb 100 feet with rolling walker device including two turns on even surface with supervision assist to facilitate safe mobility.  (Progressing)       Start:  05/20/24    Expected End:  06/03/24            Patient will increase BLE strength to 3+/5 to improve functional mobility.    (Progressing)       Start:  05/20/24    Expected End:  06/03/24                   Education Documentation  Body Mechanics, taught by Ameena Wilson PT at 5/20/2024  3:10 PM.  Learner: Patient  Readiness: Acceptance  Method: Explanation  Response: Verbalizes Understanding, Needs Reinforcement  Comment: Education re: safe mobility technique, use of logroll for pain management    Mobility Training, taught by Ameena Wilson PT at 5/20/2024  3:10 PM.  Learner:  Patient  Readiness: Acceptance  Method: Explanation  Response: Verbalizes Understanding, Needs Reinforcement  Comment: Education re: safe mobility technique, use of logroll for pain management    Body Mechanics, taught by Ameena Wilson, PT at 5/20/2024  3:10 PM.  Learner: Patient  Readiness: Acceptance  Method: Explanation  Response: Verbalizes Understanding, Needs Reinforcement  Comment: Education re: safe mobility technique, use of logroll for pain management    Precautions, taught by Ameena Wilson, PT at 5/20/2024  3:10 PM.  Learner: Patient  Readiness: Acceptance  Method: Explanation  Response: Verbalizes Understanding, Needs Reinforcement  Comment: Education re: safe mobility technique, use of logroll for pain management

## 2024-05-20 NOTE — CARE PLAN
Problem: Respiratory  Goal: Clear secretions with interventions this shift  Outcome: Progressing  Goal: Minimize anxiety/maximize coping throughout shift  Outcome: Progressing  Goal: Minimal/no exertional discomfort or dyspnea this shift  Outcome: Progressing  Goal: No signs of respiratory distress (eg. Use of accessory muscles. Peds grunting)  Outcome: Progressing  Goal: Patent airway maintained this shift  Outcome: Progressing  Goal: Verbalize decreased shortness of breath this shift  Outcome: Progressing  Goal: Wean oxygen to maintain O2 saturation per order/standard this shift  Outcome: Progressing  Goal: Tolerate mechanical ventilation evidenced by VS/agitation level this shift  Outcome: Met  Goal: Tolerate pulmonary toileting this shift  Outcome: Progressing  Goal: Increase self care and/or family involvement in next 24 hours  Outcome: Progressing

## 2024-05-21 VITALS
TEMPERATURE: 97.7 F | RESPIRATION RATE: 17 BRPM | SYSTOLIC BLOOD PRESSURE: 133 MMHG | HEART RATE: 88 BPM | DIASTOLIC BLOOD PRESSURE: 64 MMHG | OXYGEN SATURATION: 95 %

## 2024-05-21 PROBLEM — K81.0 ACUTE CHOLECYSTITIS: Status: RESOLVED | Noted: 2024-05-18 | Resolved: 2024-05-21

## 2024-05-21 LAB
ALBUMIN SERPL-MCNC: 2 G/DL (ref 3.5–5)
ALP BLD-CCNC: 134 U/L (ref 35–125)
ALT SERPL-CCNC: 31 U/L (ref 5–40)
ANION GAP SERPL CALC-SCNC: 11 MMOL/L
AORTIC VALVE MEAN GRADIENT: 2.6 MMHG
AORTIC VALVE PEAK VELOCITY: 1.29 M/S
AST SERPL-CCNC: 23 U/L (ref 5–40)
AV PEAK GRADIENT: 6.7 MMHG
AVA (PEAK VEL): 2.06 CM2
AVA (VTI): 2.61 CM2
BILIRUB SERPL-MCNC: 0.4 MG/DL (ref 0.1–1.2)
BUN SERPL-MCNC: 25 MG/DL (ref 8–25)
CALCIUM SERPL-MCNC: 7.7 MG/DL (ref 8.5–10.4)
CHLORIDE SERPL-SCNC: 99 MMOL/L (ref 97–107)
CO2 SERPL-SCNC: 24 MMOL/L (ref 24–31)
CREAT SERPL-MCNC: 1.1 MG/DL (ref 0.4–1.6)
EGFRCR SERPLBLD CKD-EPI 2021: 68 ML/MIN/1.73M*2
EJECTION FRACTION APICAL 4 CHAMBER: 60.9
ERYTHROCYTE [DISTWIDTH] IN BLOOD BY AUTOMATED COUNT: 12.8 % (ref 11.5–14.5)
GLUCOSE SERPL-MCNC: 123 MG/DL (ref 65–99)
HCT VFR BLD AUTO: 34.7 % (ref 41–52)
HGB BLD-MCNC: 11.9 G/DL (ref 13.5–17.5)
LABORATORY COMMENT REPORT: NORMAL
LEFT ATRIUM VOLUME AREA LENGTH INDEX BSA: 17.5 ML/M2
LEFT VENTRICLE INTERNAL DIMENSION DIASTOLE: 5.85 CM (ref 3.5–6)
LEFT VENTRICULAR OUTFLOW TRACT DIAMETER: 1.93 CM
LV EJECTION FRACTION BIPLANE: 68 %
MCH RBC QN AUTO: 31.7 PG (ref 26–34)
MCHC RBC AUTO-ENTMCNC: 34.3 G/DL (ref 32–36)
MCV RBC AUTO: 93 FL (ref 80–100)
MITRAL VALVE E/A RATIO: 0.85
NRBC BLD-RTO: 0 /100 WBCS (ref 0–0)
PATH REPORT.FINAL DX SPEC: NORMAL
PATH REPORT.GROSS SPEC: NORMAL
PATH REPORT.RELEVANT HX SPEC: NORMAL
PATH REPORT.TOTAL CANCER: NORMAL
PLATELET # BLD AUTO: 300 X10*3/UL (ref 150–450)
POTASSIUM SERPL-SCNC: 3.9 MMOL/L (ref 3.4–5.1)
PROT SERPL-MCNC: 4.9 G/DL (ref 5.9–7.9)
RBC # BLD AUTO: 3.75 X10*6/UL (ref 4.5–5.9)
RIGHT VENTRICLE FREE WALL PEAK S': 16.12 CM/S
RIGHT VENTRICLE PEAK SYSTOLIC PRESSURE: 41.3 MMHG
SODIUM SERPL-SCNC: 134 MMOL/L (ref 133–145)
TRICUSPID ANNULAR PLANE SYSTOLIC EXCURSION: 2.8 CM
WBC # BLD AUTO: 6.8 X10*3/UL (ref 4.4–11.3)

## 2024-05-21 PROCEDURE — 94640 AIRWAY INHALATION TREATMENT: CPT

## 2024-05-21 PROCEDURE — 2500000004 HC RX 250 GENERAL PHARMACY W/ HCPCS (ALT 636 FOR OP/ED): Performed by: STUDENT IN AN ORGANIZED HEALTH CARE EDUCATION/TRAINING PROGRAM

## 2024-05-21 PROCEDURE — 97530 THERAPEUTIC ACTIVITIES: CPT | Mod: GP,CQ

## 2024-05-21 PROCEDURE — 2500000001 HC RX 250 WO HCPCS SELF ADMINISTERED DRUGS (ALT 637 FOR MEDICARE OP): Performed by: STUDENT IN AN ORGANIZED HEALTH CARE EDUCATION/TRAINING PROGRAM

## 2024-05-21 PROCEDURE — 85027 COMPLETE CBC AUTOMATED: CPT | Performed by: STUDENT IN AN ORGANIZED HEALTH CARE EDUCATION/TRAINING PROGRAM

## 2024-05-21 PROCEDURE — 9420000001 HC RT PATIENT EDUCATION 5 MIN

## 2024-05-21 PROCEDURE — 80053 COMPREHEN METABOLIC PANEL: CPT | Performed by: STUDENT IN AN ORGANIZED HEALTH CARE EDUCATION/TRAINING PROGRAM

## 2024-05-21 PROCEDURE — 97530 THERAPEUTIC ACTIVITIES: CPT | Mod: GO,CO

## 2024-05-21 PROCEDURE — 2500000002 HC RX 250 W HCPCS SELF ADMINISTERED DRUGS (ALT 637 FOR MEDICARE OP, ALT 636 FOR OP/ED): Mod: MUE | Performed by: STUDENT IN AN ORGANIZED HEALTH CARE EDUCATION/TRAINING PROGRAM

## 2024-05-21 PROCEDURE — 97110 THERAPEUTIC EXERCISES: CPT | Mod: GP,CQ

## 2024-05-21 PROCEDURE — 36415 COLL VENOUS BLD VENIPUNCTURE: CPT | Performed by: STUDENT IN AN ORGANIZED HEALTH CARE EDUCATION/TRAINING PROGRAM

## 2024-05-21 PROCEDURE — 97535 SELF CARE MNGMENT TRAINING: CPT | Mod: GO,CO

## 2024-05-21 PROCEDURE — 2500000005 HC RX 250 GENERAL PHARMACY W/O HCPCS: Performed by: STUDENT IN AN ORGANIZED HEALTH CARE EDUCATION/TRAINING PROGRAM

## 2024-05-21 RX ADMIN — PIPERACILLIN SODIUM AND TAZOBACTAM SODIUM 3.38 G: 3; .375 INJECTION, SOLUTION INTRAVENOUS at 10:21

## 2024-05-21 RX ADMIN — IPRATROPIUM BROMIDE AND ALBUTEROL SULFATE 3 ML: 2.5; .5 SOLUTION RESPIRATORY (INHALATION) at 07:36

## 2024-05-21 RX ADMIN — ENOXAPARIN SODIUM 40 MG: 40 INJECTION SUBCUTANEOUS at 08:55

## 2024-05-21 RX ADMIN — Medication 21 PERCENT: at 07:36

## 2024-05-21 RX ADMIN — ACETAMINOPHEN 650 MG: 325 TABLET ORAL at 15:00

## 2024-05-21 RX ADMIN — IPRATROPIUM BROMIDE AND ALBUTEROL SULFATE 3 ML: 2.5; .5 SOLUTION RESPIRATORY (INHALATION) at 04:47

## 2024-05-21 RX ADMIN — NYSTATIN 500000 UNITS: 100000 SUSPENSION ORAL at 08:55

## 2024-05-21 RX ADMIN — ACETAMINOPHEN 650 MG: 325 TABLET ORAL at 08:52

## 2024-05-21 RX ADMIN — PIPERACILLIN SODIUM AND TAZOBACTAM SODIUM 3.38 G: 3; .375 INJECTION, SOLUTION INTRAVENOUS at 04:50

## 2024-05-21 RX ADMIN — FAMOTIDINE 20 MG: 20 TABLET, FILM COATED ORAL at 08:54

## 2024-05-21 RX ADMIN — KETOROLAC TROMETHAMINE 15 MG: 30 INJECTION, SOLUTION INTRAMUSCULAR at 04:50

## 2024-05-21 RX ADMIN — NYSTATIN 500000 UNITS: 100000 SUSPENSION ORAL at 15:00

## 2024-05-21 RX ADMIN — OXYCODONE 5 MG: 5 TABLET ORAL at 00:49

## 2024-05-21 ASSESSMENT — COGNITIVE AND FUNCTIONAL STATUS - GENERAL
MOBILITY SCORE: 14
MOVING TO AND FROM BED TO CHAIR: A LITTLE
DRESSING REGULAR LOWER BODY CLOTHING: A LOT
CLIMB 3 TO 5 STEPS WITH RAILING: TOTAL
DAILY ACTIVITIY SCORE: 19
DAILY ACTIVITIY SCORE: 19
MOVING FROM LYING ON BACK TO SITTING ON SIDE OF FLAT BED WITH BEDRAILS: A LITTLE
HELP NEEDED FOR BATHING: A LITTLE
DRESSING REGULAR UPPER BODY CLOTHING: A LITTLE
TOILETING: A LITTLE
WALKING IN HOSPITAL ROOM: A LOT
STANDING UP FROM CHAIR USING ARMS: A LOT
TURNING FROM BACK TO SIDE WHILE IN FLAT BAD: A LOT
HELP NEEDED FOR BATHING: A LITTLE
DRESSING REGULAR UPPER BODY CLOTHING: A LITTLE
CLIMB 3 TO 5 STEPS WITH RAILING: A LOT
TOILETING: A LITTLE
TURNING FROM BACK TO SIDE WHILE IN FLAT BAD: A LITTLE
WALKING IN HOSPITAL ROOM: A LITTLE
MOVING TO AND FROM BED TO CHAIR: A LOT
STANDING UP FROM CHAIR USING ARMS: A LITTLE
MOBILITY SCORE: 15
MOVING FROM LYING ON BACK TO SITTING ON SIDE OF FLAT BED WITH BEDRAILS: A LITTLE
DRESSING REGULAR LOWER BODY CLOTHING: A LOT

## 2024-05-21 ASSESSMENT — PAIN - FUNCTIONAL ASSESSMENT
PAIN_FUNCTIONAL_ASSESSMENT: 0-10

## 2024-05-21 ASSESSMENT — PAIN SCALES - GENERAL
PAINLEVEL_OUTOF10: 6
PAINLEVEL_OUTOF10: 7
PAINLEVEL_OUTOF10: 2
PAINLEVEL_OUTOF10: 0 - NO PAIN
PAINLEVEL_OUTOF10: 6

## 2024-05-21 ASSESSMENT — PAIN SCALES - PAIN ASSESSMENT IN ADVANCED DEMENTIA (PAINAD): TOTALSCORE: MEDICATION (SEE MAR)

## 2024-05-21 ASSESSMENT — ACTIVITIES OF DAILY LIVING (ADL)
BATHING_EQUIPMENT_NEEDED: LONG-HANDLED SPONGE
BATHING_WHERE_ASSESSED: SITTING SINKSIDE
HOME_MANAGEMENT_TIME_ENTRY: 28
BATHING_LEVEL_OF_ASSISTANCE: MODERATE ASSISTANCE

## 2024-05-21 ASSESSMENT — PAIN DESCRIPTION - DESCRIPTORS
DESCRIPTORS: ACHING
DESCRIPTORS: ACHING
DESCRIPTORS: ACHING;CRAMPING

## 2024-05-21 ASSESSMENT — PAIN DESCRIPTION - ORIENTATION
ORIENTATION: RIGHT
ORIENTATION: UPPER
ORIENTATION: UPPER

## 2024-05-21 ASSESSMENT — PAIN DESCRIPTION - LOCATION
LOCATION: ABDOMEN

## 2024-05-21 NOTE — PROGRESS NOTES
Physical Therapy    Physical Therapy Treatment    Patient Name: Nas Jaffe  MRN: [id number]  Today's Date: 5/21/2024  Time Calculation  Start Time: 0920  Stop Time: 0945  Time Calculation (min): 25 min    Assessment/Plan   PT Assessment  PT Assessment Results: Decreased strength, Decreased endurance, Impaired balance, Decreased range of motion, Decreased mobility, Impaired judgement, Decreased safety awareness, Impaired sensation, Obesity  Rehab Prognosis: Good  Evaluation/Treatment Tolerance: Patient limited by fatigue, Patient limited by pain  Strengths: Ability to acquire knowledge, Access to adaptive/assistive products, Support of Caregivers  Barriers to Participation: Premorbid level of function  End of Session Communication: Bedside nurse  Assessment Comment: 79 year old male admitted with acute cholecystitis and has undergone laparoscopy surgical cholecystectomy who requires  assist x 1 for basic mobility at [facility]. Patient presents with B LE weakness, impaired functional activity tolerance, and impaired  balance. Patient is at high risk for falls and has suffered a functional decline. Meghannet is in multilevel home iwth support of family. Patient requires moderate intensity rehab follow up.  End of Session Patient Position: Up in chair, Alarm off, not on at start of session  PT Plan  Inpatient/Swing Bed or Outpatient: Inpatient  PT Plan  Treatment/Interventions: Bed mobility, Transfer training, Gait training, Balance training, Strengthening, Endurance training, Range of motion, Therapeutic exercise, Therapeutic activity, Positioning  PT Plan: Skilled PT  PT Frequency: 5 times per week  PT Discharge Recommendations: Moderate intensity level of continued care  Equipment Recommended upon Discharge: Wheeled walker  PT Recommended Transfer Status: Assist x1, Assistive device  PT - OK to Discharge: Yes    General Visit Information:   PT  Visit  PT Received On: 05/21/24  Response to Previous Treatment: Patient with  no complaints from previous session.  General  Reason for Referral: 79 year old admitted with acute cholecysititis 5/18/24 Laparoscopy cholecystectomy  Referred By: Dr. Multani  Past Medical History Relevant to Rehab: CHF, HTN, obesity, JESENIA, chronic back pain  Missed Visit: No  Family/Caregiver Present: No  Prior to Session Communication: Bedside nurse  Patient Position Received: Bed, 2 rail up, Alarm off, not on at start of session  Preferred Learning Style: verbal, visual  General Comment: Cleared by RN, NAD, agreeable to PT    Subjective   Precautions:  Precautions  Hearing/Visual Limitations: none  Medical Precautions: Fall precautions  Post-Surgical Precautions: Abdominal surgery precautions  Vital Signs:     Objective   Pain:  Pain Assessment  Pain Assessment: 0-10  Pain Score: 6  Pain Type: Surgical pain  Pain Location: Abdomen  Pain Orientation: Upper  Pain Descriptors: Aching, Cramping  Pain Frequency: Constant/continuous  Patient's Stated Pain Goal: No pain  Pain Interventions: Ambulation/increased activity  Response to Interventions: Unchanged  Cognition:  Cognition  Overall Cognitive Status: Within Functional Limits  Orientation Level: Oriented X4  Insight: Mild  Impulsive: Mildly     Postural Control:  Postural Control  Postural Control: Impaired  Posture Comment: forward head, rounded shoulders, forward flexed  Static Sitting Balance  Static Sitting-Balance Support: Bilateral upper extremity supported, Feet supported  Static Sitting-Level of Assistance: Close supervision  Static Sitting-Comment/Number of Minutes: Wide DE  Dynamic Sitting Balance  Dynamic Sitting-Balance Support: Bilateral upper extremity supported, Feet supported  Dynamic Sitting-Balance: Forward lean  Dynamic Sitting-Comments: Wide DE  Static Standing Balance  Static Standing-Balance Support: Bilateral upper extremity supported  Static Standing-Level of Assistance: Minimum assistance  Static Standing-Comment/Number of Minutes:  +RW  Dynamic Standing Balance  Dynamic Standing-Balance Support: Bilateral upper extremity supported  Dynamic Standing-Balance: Turning  Dynamic Standing-Comments: +RW     Activity Tolerance:  Activity Tolerance  Endurance: Decreased tolerance for upright activites  Activity Tolerance Comments: Increased time, effort, encouragement to complete activities. Rest breaks for pain and fatigue with fair recovery.  Rate of Perceived Exertion (RPE): 6/10  Treatments:  Therapeutic Exercise  Therapeutic Exercise Performed: Yes  Therapeutic Exercise Activity 1: B AP x20  Therapeutic Exercise Activity 2: B SAQ x15  Therapeutic Exercise Activity 3: B HF x15  Therapeutic Exercise Activity 4: B GS x15  Therapeutic Exercise Activity 5: Performed seated in bedside chair. (Verbal cueing for technique and completion.)    Therapeutic Activity  Therapeutic Activity Performed: Yes  Therapeutic Activity 1: Functional transfers, mobility, education.    Bed Mobility  Bed Mobility: Yes  Bed Mobility 1  Bed Mobility 1: Supine to sitting, Scooting  Level of Assistance 1: Minimum assistance  Bed Mobility Comments 1: HOB elevated and side rail used for assist. Min HHA to scoot to EOB once upright.    Ambulation/Gait Training  Ambulation/Gait Training Performed: Yes  Ambulation/Gait Training 1  Surface 1: Level tile  Device 1: Rolling walker  Assistance 1: Minimum assistance  Quality of Gait 1: Wide base of support, Soft knee(s), Inconsistent stride length, Decreased step length  Comments/Distance (ft) 1: 15'x2; minimal bilat foot clearance. Verbal cueing for AD proximity  Transfers  Transfer: Yes  Transfer 1  Transfer From 1: Bed to  Transfer to 1: Chair with arms  Technique 1: Sit to stand, Stand to sit  Transfer Device 1: Walker  Transfer Level of Assistance 1: Minimum assistance  Trials/Comments 1: Verbal cueing for hand placement, eccentric control, safety with transfers.    Stairs  Stairs: No    Outcome Measures:  [facility] Basic  Mobility  Turning from your back to your side while in a flat bed without using bedrails: A little  Moving from lying on your back to sitting on the side of a flat bed without using bedrails: A lot  Moving to and from bed to chair (including a wheelchair): A little  Standing up from a chair using your arms (e.g. wheelchair or bedside chair): A little  To walk in hospital room: A little  Climbing 3-5 steps with railing: Total  Basic Mobility - Total Score: 15    OP EDUCATION:  Outpatient Education  Individual(s) Educated: Patient  Education Provided: Anatomy, Body Mechanics, Fall Risk, Other (Energy conservation, pain managment technique, therapeutic breathing technique for safety and comfort.)  Patient Response to Education: Patient/Caregiver Verbalized Understanding of Information  Education Comment: Needs reinforcement    Encounter Problems       Encounter Problems (Active)       PT Goals       Patient will transfer supine to sit and sit to supine with supervision assist to facilitate mobility.  (Progressing)       Start:  05/20/24    Expected End:  06/03/24            Patient will transfer bed to chair and chair to bed with supervision assist to facilitate mobility.  (Progressing)       Start:  05/20/24    Expected End:  06/03/24            Patient will amb 100 feet with rolling walker device including two turns on even surface with supervision assist to facilitate safe mobility.  (Progressing)       Start:  05/20/24    Expected End:  06/03/24            Patient will increase BLE strength to 3+/5 to improve functional mobility.    (Progressing)       Start:  05/20/24    Expected End:  06/03/24               Pain - Adult

## 2024-05-21 NOTE — CARE PLAN
The patient's goals for the shift include      The clinical goals for the shift include Pt will report adequate relief of pain

## 2024-05-21 NOTE — DISCHARGE INSTRUCTIONS
Cholecystectomy Discharge Instructions    About this topic  Cholecystectomy is surgery to remove the gallbladder. There are two types of surgeries done for this. You may have had your gallbladder taken out with a laparoscope. This means you have a few small cuts in your belly. Other times, it is taken out through one large cut in your belly. This is called an open procedure.    What care is needed at home?  Ask your doctor what you need to do when you go home. Make sure you ask questions if you do not understand what the doctor says. This way you will know what you need to do.  Talk to your doctor about how to care for your cut site. Ask your doctor about:  When you should change your bandages  When you may take a bath or shower  If you need to be careful with lifting things over 10 pounds (4.5 kg)  When you may go back to your normal activities like work, driving, or sex  Be sure to wash your hands before and after touching your wound or dressing.  If you had a laparoscopic procedure, you will have a few small cut sites. They may have special tape on them called steri-strips. These will fall off on their own in about 10 days. You can take them off after 10 to 14 days if they have not fallen off. They may also use a skin glue to keep the cut sites together. This will slowly peel off on its own in about 7 to 14 days. Do not pick or peel at them.  If you had an open procedure, you will have one large cut site. It may have sutures or staples. You may also have a drain to get rid of extra fluid. When the drain is taken out, there will be a small cut site.  Keep coughing and doing deep breathing exercises for 7 to 10 days after you go home. This will help prevent lung infections.    What follow-up care is needed?  Your doctor may ask you to make visits to the office to check on your progress. Be sure to keep these visits.  If you have stitches or staples, you will need to have them taken out. Your doctor will often want  to do this in 1 to 2 weeks. If the doctor used skin glue, the glue will fall off on its own.  If you have a drain, you will need to have that removed in the office. Your doctor will want to do this in 1 to 2 weeks.  What drugs may be needed?  The doctor may order drugs to:  Help with pain  Fight an infection  Soften stools if you are taking drugs for pain control  Help with upset stomach  Prevent blood clots  Will physical activity be limited?  Try to walk 3 to 4 times each day. Do your best to walk a little farther and longer each day.  You may need to rest for a while. Talk to your doctor before you run, work out, or play sports.  Do light household work only, such as washing dishes or helping with meals.  What changes to diet are needed?  Drink 8 to 10 glasses of fluids each day.  Avoid eating greasy or spicy foods.  Your doctor may suggest a high-fiber diet. Ask your doctor if you need to change your diet.  What problems could happen?  Bleeding  Infection  Swelling of the pancreas  Injury to small bowel  When do I need to call the doctor?  Signs of infection. These include a fever of 100.4°F (38°C) or higher, chills, very bad sore throat, pain with passing urine, or wound that will not heal.  Signs of wound infection. These include swelling, redness, warmth around the wound; too much pain when touched; yellowish, greenish, or bloody discharge; foul smell coming from the cut site; cut site opens up.  Signs of liver problems like upset stomach or throwing up, belly pain, feeling tired, dark urine, yellow skin or eyes, not hungry.  Bowel movements that are white or gray in color or no bowel movement for 2 to 3 days after surgery  Sudden onset of chest pain, fast heartbeat, breathing problems, and pain or tenderness in your calf could be a sign that a blood clot has traveled to your lungs. Call for emergency help right away.  Helpful tips  When you cough, sneeze, or do deep breathing exercises, press a pillow across  your cut to support the muscles to protect the wound and ease pain.  Be active to help healing and prevent blood clots.  Teach Back: Helping You Understand  The Teach Back Method helps you understand the information we are giving you. After you talk with the staff, tell them in your own words what you learned. This helps to make sure the staff has described each thing clearly. It also helps to explain things that may have been confusing. Before going home, make sure you can do these:  I can tell you about my procedure.  I can tell you how to care for my cut site.  I can tell you what I will do if I have swelling, redness, or warmth around my wound.   Detail Level: Generalized Detail Level: Zone

## 2024-05-21 NOTE — NURSING NOTE
Family at bedside, notified family member and pt that transport is at 3pm for discharge [facility]

## 2024-05-21 NOTE — PROGRESS NOTES
Occupational Therapy    OT Treatment    Patient Name: Nas Jaffe  MRN: [id number]  Today's Date: 5/21/2024  Time Calculation  Start Time: 1054  Stop Time: 1132  Time Calculation (min): 38 min         Assessment:  OT Assessment: Pt actively participating with all requestd tasks progressing with POC.  Will continue with skilled OT services to address remaining deficits.  Prognosis: Good  Barriers to Discharge: None  Evaluation/Treatment Tolerance: Patient tolerated treatment well  Medical Staff Made Aware: Yes  End of Session Communication: Bedside nurse  End of Session Patient Position: Up in chair, Alarm off, not on at start of session (all needs met call light in reach)  OT Assessment Results: Decreased ADL status, Decreased endurance, Decreased functional mobility, Decreased IADLs  Prognosis: Good  Barriers to Discharge: None  Evaluation/Treatment Tolerance: Patient tolerated treatment well  Medical Staff Made Aware: Yes  Strengths: Ability to acquire knowledge, Coping skills  Barriers to Participation: Premorbid level of function  Plan:  Treatment Interventions: ADL retraining, Functional transfer training, Endurance training, Patient/family training, Equipment evaluation/education, Compensatory technique education  OT Frequency: 5 times per week  OT Discharge Recommendations: Moderate intensity level of continued care  Equipment Recommended upon Discharge: Wheeled walker  OT - OK to Discharge: Yes  Treatment Interventions: ADL retraining, Functional transfer training, Endurance training, Patient/family training, Equipment evaluation/education, Compensatory technique education    Subjective   Previous Visit Info:  OT Last Visit  OT Received On: 05/21/24  General:  General  Reason for Referral: 79 year old admitted with acute cholecysititis 5/18/24 Laparoscopy cholecystectomy  Referred By: Dr. Multani  Past Medical History Relevant to Rehab: CHF, HTN, obesity, JESENIA, chronic back pain  Prior to Session  Communication: Bedside nurse  Patient Position Received: Bed, 2 rail up, Alarm off, not on at start of session  Preferred Learning Style: verbal, visual  General Comment: Cleared by NSG.  Pt agreeable toskilled therapeutic interventoipn  Precautions:  Medical Precautions: Fall precautions  Post-Surgical Precautions: Abdominal surgery precautions  Precautions Comment: Issued handout reviewed Pt verbalize proper techniques    Pain:  Pain Assessment  Pain Assessment: 0-10  Pain Score: 6  Pain Type: Surgical pain  Pain Location: Generalized  Pain Interventions: Repositioned, Environmental changes, Relaxation technique, Therapeutic presence  Response to Interventions: Pt participating with all tasks    Objective    Cognition:  Cognition  Overall Cognitive Status: Within Functional Limits  Orientation Level: Oriented X4  Coordination:  Movements are Fluid and Coordinated: No  Coordination Comment: slow and effortful movement  Activities of Daily Living:      Grooming  Grooming Level of Assistance: Modified independent  Grooming Where Assessed: Chair  Grooming Comments: Pt combing hair items in reach    UE Bathing  UE Bathing Level of Assistance: Setup  UE Bathing Where Assessed: Sitting sinkside  UE Bathing Comments: Pt sponge bathing    LE Bathing  LE Bathing Adaptive Equipment: Long-handled sponge (recommendation use discussed with Pt)  LE Bathing Level of Assistance: Moderate assistance  LE Bathing Where Assessed: Sitting sinkside  LE Bathing Comments: assist B feet/buttocks  Pt sponge bathinng    UE Dressing  UE Dressing Level of Assistance: Setup  UE Dressing Where Assessed: Edge of bed, Chair  UE Dressing Comments: Pt doff/don hospital gown    LE Dressing  LE Dressing: Yes  LE Dressing Adaptive Equipment: Sock aide, Reacher  Pants Level of Assistance: Minimum assistance (Pt thread Pants LH reacher wasit in stance)  Sock Level of Assistance: Setup (Pt doff/don LH reacher sock aid)  LE Dressing Where Assessed: Chair  (walker in stance)  LE Dressing Comments: Pt educated with use AE efficient    Toileting  Toileting Level of Assistance: Minimum assistance  Where Assessed: Toilet  Toileting Comments: Pt adjust clothing prior/after assist hygiene  Functional Standing Tolerance:  Time: 5 minutes  Activity: stance  phase ADL skills/functional mobility  Functional Standing Tolerance Comments: no LOB  Bed Mobility/Transfers: Bed Mobility  Bed Mobility: Yes  Bed Mobility 1  Bed Mobility 1: Supine to sitting, Scooting  Level of Assistance 1: Close supervision  Bed Mobility Comments 1: HOB to neutral use side transfer bar effortful    Transfers  Transfer: Yes  Transfer 1  Transfer From 1: Bed to  Transfer to 1: Stand  Technique 1: Sit to stand  Transfer Device 1: Walker  Transfer Level of Assistance 1: Contact guard  Trials/Comments 1: Verbal cueing for hand placement, eccentric control, safety with transfers.    Toilet Transfers  Toilet Transfer From: Rolling walker  Toilet Transfer Type: To and from  Toilet Transfer to: Standard toilet  Toilet Transfer Technique: Ambulating  Toilet Transfers: Contact guard  Toilet Transfers Comments: grab bar use vc hand placement    Functional Mobility:  Functional Mobility  Functional Mobility Performed: Yes  Functional Mobility 1  Surface 1: Level tile  Device 1: Rolling walker  Assistance 1: Minimum assistance  Quality of Functional Mobility 1: Inconsistent stride length  Comments 1: vc safe kimi with advancing RLE    Outcome Measures:[facility] Daily Activity  Putting on and taking off regular lower body clothing: A lot  Bathing (including washing, rinsing, drying): A little  Putting on and taking off regular upper body clothing: A little  Toileting, which includes using toilet, bedpan or urinal: A little  Taking care of personal grooming such as brushing teeth: None  Eating Meals: None  Daily Activity - Total Score: 19    Education Documentation  Body Mechanics, taught by ASHLY Parsons at  5/21/2024 11:46 AM.  Learner: Patient  Readiness: Acceptance  Method: Explanation, Demonstration, Teach-back  Response: Verbalizes Understanding, Demonstrated Understanding  Comment: Instructed Pt with application abdominal precautions to safe transfers, ADL skills and compesatory strategies    Precautions, taught by ASHLY Parsons at 5/21/2024 11:46 AM.  Learner: Patient  Readiness: Acceptance  Method: Explanation, Demonstration, Teach-back  Response: Verbalizes Understanding, Demonstrated Understanding  Comment: Instructed Pt with application abdominal precautions to safe transfers, ADL skills and compesatory strategies    ADL Training, taught by ASHLY Parsons at 5/21/2024 11:46 AM.  Learner: Patient  Readiness: Acceptance  Method: Explanation, Demonstration, Teach-back  Response: Verbalizes Understanding, Demonstrated Understanding  Comment: Instructed Pt with application abdominal precautions to safe transfers, ADL skills and compesatory strategies    Education Comments  No comments found.        OP EDUCATION:       Goals:  Encounter Problems       Encounter Problems (Active)       ADLs       Pt will complete ADL tasks at mod I with use of AE prn  (Progressing)       Start:  05/20/24    Expected End:  06/10/24               Functional Mobility       Pt will perform functional mobility household distances at mod I with use of RW.    (Progressing)       Start:  05/20/24    Expected End:  06/10/24               Instrumental Activities of Daily Living       Pt will perform simple IADLs/retrieval of items at mod I (Progressing)       Start:  05/20/24    Expected End:  06/10/24               OT Transfers       Pt will perform functional transfers at mod I (Progressing)       Start:  05/20/24    Expected End:  06/10/24

## 2024-05-21 NOTE — PROGRESS NOTES
Pt has DC order. Bharati signed. 7000 completed. Pt notified of dc and going to [facility]. Setting up transport. Gave RN Elise number to call report. Transport set up for 2:45pm via round trip. Sent DC and AVS summary via [employer].     PATIENT HAS A SECURE DISCHARGE PLAN TO GO TO [facility] AT 2:45PM.        05/21/24 1335   Discharge Planning   Home or Post Acute Services Post acute facilities (Rehab/SNF/etc)   Type of Post Acute Facility Services Skilled nursing   Patient expects to be discharged to: [facility]   Does the patient need discharge transport arranged? Yes   RoundTrip coordination needed? Yes   Has discharge transport been arranged? No   What day is the transport expected? 05/21/24

## 2024-05-21 NOTE — PROGRESS NOTES
05/21/24 1337   Current Planned Discharge Disposition   Current Planned Discharge Disposition SNF  ([facility])

## 2024-05-21 NOTE — CARE PLAN
Problem: Respiratory  Goal: Clear secretions with interventions this shift  Outcome: Progressing  Goal: Minimize anxiety/maximize coping throughout shift  Outcome: Progressing  Goal: Minimal/no exertional discomfort or dyspnea this shift  Outcome: Progressing  Goal: No signs of respiratory distress (eg. Use of accessory muscles. Peds grunting)  Outcome: Progressing  Goal: Patent airway maintained this shift  Outcome: Progressing  Goal: Verbalize decreased shortness of breath this shift  Outcome: Progressing  Goal: Wean oxygen to maintain O2 saturation per order/standard this shift  Outcome: Progressing  Goal: Tolerate pulmonary toileting this shift  Outcome: Progressing  Goal: Increase self care and/or family involvement in next 24 hours  Outcome: Progressing

## 2024-05-21 NOTE — PROGRESS NOTES
Pt accepted at [facility]. Bed available on 05/23/24.     SECURE DISCHARGE TO [facility] ON 05/23/24.       05/21/24 1048   Current Planned Discharge Disposition   Current Planned Discharge Disposition SNF  ([facility])

## 2024-05-21 NOTE — DISCHARGE SUMMARY
Discharge Diagnosis  Acute cholecystitis    Issues Requiring Follow-Up  Cholecystectomy     Test Results Pending At Discharge  Pending Labs       Order Current Status    Surgical Pathology Exam In process            Hospital Course   Patient returned to ED c/o abdominal pain, bloating, nausea, and vomiting after presenting for the same issue and being discharged 5 days previous. HIDA scan positive for acute cholecystitis. Patient then transferred to [facility] for surgery. On the floor, patient had episode of diaphoresis and decreased BP. Patient continued on Zosyn. Patient underwent laparoscopic cholecystectomy 05/19. Patient tolerated this procedure well. Patient had some bloating post surgically, likely post operative ileus. PT/OT ordered. Today, patient tolerating regular diet well. Patient accepted at [facility] for discharge.     Pertinent Physical Exam At Time of Discharge  Physical Exam  Constitutional:       Appearance: He is obese.   HENT:      Head: Normocephalic and atraumatic.      Nose: Nose normal.      Mouth/Throat:      Mouth: Mucous membranes are moist.   Cardiovascular:      Rate and Rhythm: Normal rate.   Pulmonary:      Effort: Pulmonary effort is normal. No respiratory distress.   Abdominal:      General: There is distension.      Tenderness: There is abdominal tenderness. There is no guarding.      Hernia: No hernia is present.   Musculoskeletal:         General: Normal range of motion.      Cervical back: Normal range of motion.   Skin:     General: Skin is warm and dry.      Comments: Surgical incisions CDI   Neurological:      General: No focal deficit present.      Mental Status: He is alert.   Psychiatric:         Behavior: Behavior normal.         Home Medications     Medication List      CONTINUE taking these medications     acetaminophen 325 mg tablet; Commonly known as: Tylenol; Take 2 tablets   (650 mg) by mouth every 6 hours if needed for mild pain (1 - 3) for up to   10 days.    albuterol 2.5 mg /3 mL (0.083 %) nebulizer solution   aspirin 81 mg EC tablet   famotidine 20 mg tablet; Commonly known as: Pepcid; Take 1 tablet (20   mg) by mouth once daily for 15 days.   lisinopril 10 mg tablet   metoprolol tartrate 25 mg tablet; Commonly known as: Lopressor   potassium chloride CR 20 mEq ER tablet; Commonly known as: Klor-Con M20   traMADol 50 mg tablet; Commonly known as: Ultram     STOP taking these medications     ondansetron 4 mg tablet; Commonly known as: Zofran       Outpatient Follow-Up  Future Appointments   Date Time Provider Department [place]   5/31/2024  2:30 PM SANDEEP Encarnacion-RAJEEV [id number] [facility]   6/6/2024  7:45 AM Cassandra Joshi PT [id number]       Renee Elizabeth PA-C

## 2024-05-22 PROBLEM — Z20.822 CONTACT WITH AND (SUSPECTED) EXPOSURE TO COVID-19: Status: ACTIVE | Noted: 2022-10-03

## 2024-05-22 PROBLEM — R06.00 DYSPNEA: Status: ACTIVE | Noted: 2024-05-22

## 2024-05-22 PROBLEM — M25.559 HIP PAIN: Status: ACTIVE | Noted: 2024-05-22

## 2024-05-22 PROBLEM — J44.1 ACUTE EXACERBATION OF CHRONIC OBSTRUCTIVE PULMONARY DISEASE (MULTI): Status: ACTIVE | Noted: 2022-10-03

## 2024-05-28 NOTE — ANESTHESIA POSTPROCEDURE EVALUATION
Detail Level: Zone Patient: Nas Jaffe    Procedure Summary       Date: 05/19/24 Room / Location: [facility] OR 10 / Virtual ROD OR    Anesthesia Start: 1006 Anesthesia Stop: 1147    Procedure: Cholecystectomy Laparoscopy (Abdomen) Diagnosis:       Acute cholecystitis      (Acute cholecystitis [K81.0])    Surgeons: Shabnam Multani MD Responsible Provider: Yoni Valdez MD    Anesthesia Type: general ASA Status: 3 - Emergent            Anesthesia Type: general    Vitals Value Taken Time   /70 05/19/24 1147   Temp 37 05/19/24 1147   Pulse 94 05/19/24 1147   Resp 24 05/19/24 1147   SpO2 81 % 05/19/24 1147   Vitals shown include unfiled device data.    Anesthesia Post Evaluation    Patient location during evaluation: PACU  Patient participation: complete - patient participated  Level of consciousness: sleepy but conscious  Pain score: 2  Pain management: adequate  Multimodal analgesia pain management approach  Airway patency: patent  Cardiovascular status: acceptable  Respiratory status: acceptable  Hydration status: acceptable  Postoperative Nausea and Vomiting: none        No notable events documented.

## 2024-05-31 ENCOUNTER — OFFICE VISIT (OUTPATIENT)
Dept: SURGERY | Facility: CLINIC | Age: 79
End: 2024-05-31
Payer: MEDICARE

## 2024-05-31 VITALS
OXYGEN SATURATION: 94 % | HEIGHT: 68 IN | WEIGHT: 235 LBS | DIASTOLIC BLOOD PRESSURE: 56 MMHG | SYSTOLIC BLOOD PRESSURE: 104 MMHG | TEMPERATURE: 97.4 F | BODY MASS INDEX: 35.61 KG/M2 | HEART RATE: 64 BPM

## 2024-05-31 DIAGNOSIS — K81.0 ACUTE CHOLECYSTITIS: Primary | ICD-10-CM

## 2024-05-31 PROCEDURE — 1111F DSCHRG MED/CURRENT MED MERGE: CPT | Performed by: NURSE PRACTITIONER

## 2024-05-31 PROCEDURE — 1125F AMNT PAIN NOTED PAIN PRSNT: CPT | Performed by: NURSE PRACTITIONER

## 2024-05-31 PROCEDURE — 1159F MED LIST DOCD IN RCRD: CPT | Performed by: NURSE PRACTITIONER

## 2024-05-31 PROCEDURE — 99024 POSTOP FOLLOW-UP VISIT: CPT | Performed by: NURSE PRACTITIONER

## 2024-05-31 PROCEDURE — 1036F TOBACCO NON-USER: CPT | Performed by: NURSE PRACTITIONER

## 2024-05-31 RX ORDER — BUDESONIDE, GLYCOPYRROLATE, AND FORMOTEROL FUMARATE 160; 9; 4.8 UG/1; UG/1; UG/1
AEROSOL, METERED RESPIRATORY (INHALATION)
COMMUNITY

## 2024-05-31 RX ORDER — MAGNESIUM GLYCINATE 100 MG
CAPSULE ORAL
COMMUNITY
End: 2024-06-10 | Stop reason: HOSPADM

## 2024-05-31 RX ORDER — ONDANSETRON 4 MG/1
4 TABLET, FILM COATED ORAL EVERY 6 HOURS PRN
COMMUNITY
End: 2024-06-10 | Stop reason: HOSPADM

## 2024-05-31 RX ORDER — ACETAMINOPHEN 325 MG/1
325 TABLET ORAL EVERY 6 HOURS PRN
COMMUNITY

## 2024-05-31 RX ORDER — AMOXICILLIN 250 MG
1 CAPSULE ORAL DAILY
COMMUNITY
End: 2024-06-10 | Stop reason: HOSPADM

## 2024-05-31 RX ORDER — DESVENLAFAXINE SUCCINATE 25 MG/1
50 TABLET, EXTENDED RELEASE ORAL DAILY
COMMUNITY
End: 2024-06-10 | Stop reason: HOSPADM

## 2024-05-31 ASSESSMENT — ENCOUNTER SYMPTOMS
WOUND: 1
PSYCHIATRIC NEGATIVE: 1
RESPIRATORY NEGATIVE: 1
MUSCULOSKELETAL NEGATIVE: 1
EYES NEGATIVE: 1
CARDIOVASCULAR NEGATIVE: 1
ABDOMINAL DISTENTION: 1
HEMATOLOGIC/LYMPHATIC NEGATIVE: 1
NEUROLOGICAL NEGATIVE: 1
ALLERGIC/IMMUNOLOGIC NEGATIVE: 1
CONSTITUTIONAL NEGATIVE: 1
ENDOCRINE NEGATIVE: 1

## 2024-05-31 ASSESSMENT — PATIENT HEALTH QUESTIONNAIRE - PHQ9
2. FEELING DOWN, DEPRESSED OR HOPELESS: NEARLY EVERY DAY
9. THOUGHTS THAT YOU WOULD BE BETTER OFF DEAD, OR OF HURTING YOURSELF: NEARLY EVERY DAY
7. TROUBLE CONCENTRATING ON THINGS, SUCH AS READING THE NEWSPAPER OR WATCHING TELEVISION: NOT AT ALL
6. FEELING BAD ABOUT YOURSELF - OR THAT YOU ARE A FAILURE OR HAVE LET YOURSELF OR YOUR FAMILY DOWN: NEARLY EVERY DAY
1. LITTLE INTEREST OR PLEASURE IN DOING THINGS: NEARLY EVERY DAY
8. MOVING OR SPEAKING SO SLOWLY THAT OTHER PEOPLE COULD HAVE NOTICED. OR THE OPPOSITE, BEING SO FIGETY OR RESTLESS THAT YOU HAVE BEEN MOVING AROUND A LOT MORE THAN USUAL: NOT AT ALL
4. FEELING TIRED OR HAVING LITTLE ENERGY: NEARLY EVERY DAY
5. POOR APPETITE OR OVEREATING: NEARLY EVERY DAY
SUM OF ALL RESPONSES TO PHQ9 QUESTIONS 1 AND 2: 6
SUM OF ALL RESPONSES TO PHQ QUESTIONS 1-9: 21
10. IF YOU CHECKED OFF ANY PROBLEMS, HOW DIFFICULT HAVE THESE PROBLEMS MADE IT FOR YOU TO DO YOUR WORK, TAKE CARE OF THINGS AT HOME, OR GET ALONG WITH OTHER PEOPLE: EXTREMELY DIFFICULT
3. TROUBLE FALLING OR STAYING ASLEEP OR SLEEPING TOO MUCH: NEARLY EVERY DAY

## 2024-05-31 ASSESSMENT — PAIN SCALES - GENERAL: PAINLEVEL: 6

## 2024-05-31 NOTE — PATIENT INSTRUCTIONS
No bathing, hot tubs, or swimming until laparoscopic sites are healed and scabs fall off.     Wound Care  Do not submerge incisions in pool, bath, or hot tub  Do not peel off Dermabond -it will gradually loosen and fall off  ° You may shower with your back to the water and pat incisions dry  Do not apply any lotions, creams, or ointments to your incisions  Activity  Do not push, pull, or lift anything.  Avoid excessive bending and twisting at the waist.  You may walk stairs.  You may sleep in any position that feels comfortable.  You must get up and walk every hour during the day.  If you plan to take a nap during the day, set an alarm for one hour so you will get up and walk around.  Potential Complications   Wound Infection - redness, increased warmth, pain, thick drainage, fever  Blood Clot/Pulmonary Embolism - calf pain or swelling, chest pain, shortness of breath, racing heart, fast breathing  CALL 9-1-1 WITH ANY CHEST PAIN OR SHORTNESS OF BREATH, otherwise call the office with any concerns at [phone].     No bending, twisting, pulling, pushing,  or lifting over 15 pounds for 4 weeks.

## 2024-05-31 NOTE — PROGRESS NOTES
"Subjective   Patient ID: Nas Jaffe is a 79 y.o. male who presents for Post-op (S/p lap mercedes on 05/17/2024 with Dr. Multani ).  Pt states that he ate ribs and a large baked potato for lunch today, and then was rushed into ambulette to come to this appointment today. He states that he is very flatulent most of the time and intermittently has abdominal distention,  but does not have nausea. He overall describes poor appetite due to the food selections at the SNF facility. He is taking Ultram for pain prn, but does not take stool softeners because they cause him to have diarrhea. He states that he had a bm this am, but still feels \"full\" after defecating. Overall, no abdominal pain like before his gallbladder surgery. No fevers or chills. +fatigue and low energy.     Pathology report states.   FINAL DIAGNOSIS  A. GALLBLADDER, CHOLECYSTECTOMY:   -- Acute necrotizing cholecystitis.  -- Cholelithiasis.    -- Features consistent with focal cholesterolosis.                Review of Systems   Constitutional: Negative.    HENT: Negative.     Eyes: Negative.    Respiratory: Negative.     Cardiovascular: Negative.    Gastrointestinal:  Positive for abdominal distention.   Endocrine: Negative.    Genitourinary: Negative.    Musculoskeletal: Negative.    Skin:  Positive for wound.   Allergic/Immunologic: Negative.    Neurological: Negative.    Hematological: Negative.    Psychiatric/Behavioral: Negative.         Objective   Physical Exam  Constitutional:       Appearance: Normal appearance.   HENT:      Head: Normocephalic and atraumatic.      Right Ear: Tympanic membrane normal.      Nose: Nose normal.      Mouth/Throat:      Mouth: Mucous membranes are moist.   Eyes:      Pupils: Pupils are equal, round, and reactive to light.   Cardiovascular:      Rate and Rhythm: Normal rate and regular rhythm.      Pulses: Normal pulses.   Pulmonary:      Effort: Pulmonary effort is normal.      Breath sounds: Normal breath sounds. "   Abdominal:      General: Bowel sounds are normal. There is distension.      Palpations: Abdomen is soft.      Tenderness: There is no abdominal tenderness. There is no guarding.      Hernia: No hernia is present.      Comments: Abdomen with laparoscopic sites CDI, with remnants of skin glue.    Genitourinary:     Rectum: Normal.   Musculoskeletal:         General: Normal range of motion.   Skin:     General: Skin is warm and dry.   Neurological:      General: No focal deficit present.      Mental Status: He is alert.   Psychiatric:         Mood and Affect: Mood normal.         Behavior: Behavior normal.         Assessment/Plan   Diagnoses and all orders for this visit:  Acute cholecystitis       Wound Care  Do not submerge incisions in pool, bath, or hot tub  Do not peel off Dermabond -it will gradually loosen and fall off  ° You may shower with your back to the water and pat incisions dry  Do not apply any lotions, creams, or ointments to your incisions  Activity  Do not push, pull, or lift anything.  Avoid excessive bending and twisting at the waist.  You may walk stairs.  You may sleep in any position that feels comfortable.  You must get up and walk every hour during the day.  If you plan to take a nap during the day, set an alarm for one hour so you will get up and walk around.  Potential Complications   Wound Infection - redness, increased warmth, pain, thick drainage, fever  Blood Clot/Pulmonary Embolism - calf pain or swelling, chest pain, shortness of breath, racing heart, fast breathing  CALL 9-1-1 WITH ANY CHEST PAIN OR SHORTNESS OF BREATH, otherwise call the office with any concerns at [phone].     No bending, twisting, pulling, pushing,  or lifting over 15 pounds for 4 weeks.   Recommended metamucil, increase water intake, increase mobilitiy    Call as needed for questions.     SANDEEP Encarnacion-CNP 05/31/24 3:45 PM

## 2024-06-06 ENCOUNTER — HOSPITAL ENCOUNTER (INPATIENT)
Facility: HOSPITAL | Age: 79
LOS: 4 days | Discharge: HOME | End: 2024-06-10
Attending: PSYCHIATRY & NEUROLOGY | Admitting: PSYCHIATRY & NEUROLOGY
Payer: MEDICARE

## 2024-06-06 ENCOUNTER — APPOINTMENT (OUTPATIENT)
Dept: CARDIOLOGY | Facility: HOSPITAL | Age: 79
DRG: 885 | End: 2024-06-06
Payer: MEDICARE

## 2024-06-06 ENCOUNTER — HOSPITAL ENCOUNTER (EMERGENCY)
Facility: HOSPITAL | Age: 79
Discharge: OTHER INSTITUTION | DRG: 885 | End: 2024-06-06
Attending: STUDENT IN AN ORGANIZED HEALTH CARE EDUCATION/TRAINING PROGRAM
Payer: MEDICARE

## 2024-06-06 VITALS
HEIGHT: 68 IN | DIASTOLIC BLOOD PRESSURE: 113 MMHG | HEART RATE: 70 BPM | OXYGEN SATURATION: 95 % | TEMPERATURE: 98.2 F | SYSTOLIC BLOOD PRESSURE: 161 MMHG | BODY MASS INDEX: 35.92 KG/M2 | WEIGHT: 237 LBS | RESPIRATION RATE: 16 BRPM

## 2024-06-06 DIAGNOSIS — E83.42 HYPOMAGNESEMIA: ICD-10-CM

## 2024-06-06 DIAGNOSIS — R45.851 SUICIDAL IDEATION: Primary | ICD-10-CM

## 2024-06-06 DIAGNOSIS — F33.2 SEVERE RECURRENT MAJOR DEPRESSION WITHOUT PSYCHOTIC FEATURES (MULTI): Primary | ICD-10-CM

## 2024-06-06 PROBLEM — F39 MOOD DISORDER (CMS-HCC): Status: ACTIVE | Noted: 2024-06-06

## 2024-06-06 LAB
ALBUMIN SERPL-MCNC: 2.7 G/DL (ref 3.5–5)
ALP BLD-CCNC: 89 U/L (ref 35–125)
ALT SERPL-CCNC: 24 U/L (ref 5–40)
AMPHETAMINES UR QL SCN>1000 NG/ML: NEGATIVE
ANION GAP SERPL CALC-SCNC: 5 MMOL/L
APPEARANCE UR: CLEAR
AST SERPL-CCNC: 23 U/L (ref 5–40)
BACTERIA #/AREA URNS AUTO: ABNORMAL /HPF
BARBITURATES UR QL SCN>300 NG/ML: NEGATIVE
BASOPHILS # BLD AUTO: 0.03 X10*3/UL (ref 0–0.1)
BASOPHILS NFR BLD AUTO: 0.7 %
BENZODIAZ UR QL SCN>300 NG/ML: NEGATIVE
BILIRUB SERPL-MCNC: 0.3 MG/DL (ref 0.1–1.2)
BILIRUB UR STRIP.AUTO-MCNC: NEGATIVE MG/DL
BUN SERPL-MCNC: 13 MG/DL (ref 8–25)
BZE UR QL SCN>300 NG/ML: NEGATIVE
CALCIUM SERPL-MCNC: 8.4 MG/DL (ref 8.5–10.4)
CANNABINOIDS UR QL SCN>50 NG/ML: NEGATIVE
CHLORIDE SERPL-SCNC: 101 MMOL/L (ref 97–107)
CO2 SERPL-SCNC: 30 MMOL/L (ref 24–31)
COLOR UR: YELLOW
CREAT SERPL-MCNC: 0.7 MG/DL (ref 0.4–1.6)
EGFRCR SERPLBLD CKD-EPI 2021: >90 ML/MIN/1.73M*2
EOSINOPHIL # BLD AUTO: 0.01 X10*3/UL (ref 0–0.4)
EOSINOPHIL NFR BLD AUTO: 0.2 %
ERYTHROCYTE [DISTWIDTH] IN BLOOD BY AUTOMATED COUNT: 12.6 % (ref 11.5–14.5)
ETHANOL SERPL-MCNC: <0.01 G/DL
FENTANYL+NORFENTANYL UR QL SCN: NEGATIVE
GLUCOSE SERPL-MCNC: 101 MG/DL (ref 65–99)
GLUCOSE UR STRIP.AUTO-MCNC: NORMAL MG/DL
HCT VFR BLD AUTO: 36.7 % (ref 41–52)
HGB BLD-MCNC: 11.8 G/DL (ref 13.5–17.5)
IMM GRANULOCYTES # BLD AUTO: 0.04 X10*3/UL (ref 0–0.5)
IMM GRANULOCYTES NFR BLD AUTO: 0.9 % (ref 0–0.9)
KETONES UR STRIP.AUTO-MCNC: NEGATIVE MG/DL
LEUKOCYTE ESTERASE UR QL STRIP.AUTO: NEGATIVE
LYMPHOCYTES # BLD AUTO: 1.67 X10*3/UL (ref 0.8–3)
LYMPHOCYTES NFR BLD AUTO: 37.3 %
MCH RBC QN AUTO: 30.7 PG (ref 26–34)
MCHC RBC AUTO-ENTMCNC: 32.2 G/DL (ref 32–36)
MCV RBC AUTO: 96 FL (ref 80–100)
METHADONE UR QL SCN>300 NG/ML: NEGATIVE
MONOCYTES # BLD AUTO: 0.66 X10*3/UL (ref 0.05–0.8)
MONOCYTES NFR BLD AUTO: 14.7 %
MUCOUS THREADS #/AREA URNS AUTO: ABNORMAL /LPF
NEUTROPHILS # BLD AUTO: 2.07 X10*3/UL (ref 1.6–5.5)
NEUTROPHILS NFR BLD AUTO: 46.2 %
NITRITE UR QL STRIP.AUTO: NEGATIVE
NRBC BLD-RTO: 0 /100 WBCS (ref 0–0)
OPIATES UR QL SCN>300 NG/ML: NEGATIVE
OXYCODONE UR QL: NEGATIVE
PCP UR QL SCN>25 NG/ML: NEGATIVE
PH UR STRIP.AUTO: 6.5 [PH]
PLATELET # BLD AUTO: 190 X10*3/UL (ref 150–450)
POTASSIUM SERPL-SCNC: 4.3 MMOL/L (ref 3.4–5.1)
PROT SERPL-MCNC: 5.4 G/DL (ref 5.9–7.9)
PROT UR STRIP.AUTO-MCNC: NORMAL MG/DL
RBC # BLD AUTO: 3.84 X10*6/UL (ref 4.5–5.9)
RBC # UR STRIP.AUTO: NEGATIVE /UL
RBC #/AREA URNS AUTO: ABNORMAL /HPF
SARS-COV-2 RNA RESP QL NAA+PROBE: NOT DETECTED
SODIUM SERPL-SCNC: 136 MMOL/L (ref 133–145)
SP GR UR STRIP.AUTO: 1.02
UROBILINOGEN UR STRIP.AUTO-MCNC: NORMAL MG/DL
WBC # BLD AUTO: 4.5 X10*3/UL (ref 4.4–11.3)
WBC #/AREA URNS AUTO: ABNORMAL /HPF

## 2024-06-06 PROCEDURE — 85025 COMPLETE CBC W/AUTO DIFF WBC: CPT | Performed by: STUDENT IN AN ORGANIZED HEALTH CARE EDUCATION/TRAINING PROGRAM

## 2024-06-06 PROCEDURE — 87635 SARS-COV-2 COVID-19 AMP PRB: CPT | Performed by: STUDENT IN AN ORGANIZED HEALTH CARE EDUCATION/TRAINING PROGRAM

## 2024-06-06 PROCEDURE — 82077 ASSAY SPEC XCP UR&BREATH IA: CPT | Performed by: STUDENT IN AN ORGANIZED HEALTH CARE EDUCATION/TRAINING PROGRAM

## 2024-06-06 PROCEDURE — 80053 COMPREHEN METABOLIC PANEL: CPT | Performed by: STUDENT IN AN ORGANIZED HEALTH CARE EDUCATION/TRAINING PROGRAM

## 2024-06-06 PROCEDURE — 1140000001 HC PRIVATE PSYCH ROOM DAILY

## 2024-06-06 PROCEDURE — 83036 HEMOGLOBIN GLYCOSYLATED A1C: CPT | Performed by: PSYCHIATRY & NEUROLOGY

## 2024-06-06 PROCEDURE — 93005 ELECTROCARDIOGRAM TRACING: CPT

## 2024-06-06 PROCEDURE — 36415 COLL VENOUS BLD VENIPUNCTURE: CPT | Performed by: PHYSICIAN ASSISTANT

## 2024-06-06 PROCEDURE — 90839 PSYTX CRISIS INITIAL 60 MIN: CPT

## 2024-06-06 PROCEDURE — 81001 URINALYSIS AUTO W/SCOPE: CPT | Performed by: STUDENT IN AN ORGANIZED HEALTH CARE EDUCATION/TRAINING PROGRAM

## 2024-06-06 PROCEDURE — 80307 DRUG TEST PRSMV CHEM ANLYZR: CPT | Performed by: STUDENT IN AN ORGANIZED HEALTH CARE EDUCATION/TRAINING PROGRAM

## 2024-06-06 PROCEDURE — 80061 LIPID PANEL: CPT | Performed by: PSYCHIATRY & NEUROLOGY

## 2024-06-06 RX ORDER — QUETIAPINE FUMARATE 25 MG/1
12.5 TABLET, FILM COATED ORAL EVERY 4 HOURS PRN
Status: DISCONTINUED | OUTPATIENT
Start: 2024-06-06 | End: 2024-06-10 | Stop reason: HOSPADM

## 2024-06-06 RX ORDER — ACETAMINOPHEN 325 MG/1
325 TABLET ORAL EVERY 6 HOURS PRN
Status: DISCONTINUED | OUTPATIENT
Start: 2024-06-06 | End: 2024-06-07 | Stop reason: SDUPTHER

## 2024-06-06 RX ORDER — LISINOPRIL 10 MG/1
10 TABLET ORAL DAILY
Status: DISCONTINUED | OUTPATIENT
Start: 2024-06-07 | End: 2024-06-10 | Stop reason: HOSPADM

## 2024-06-06 RX ORDER — METOPROLOL TARTRATE 25 MG/1
25 TABLET, FILM COATED ORAL DAILY
Status: DISCONTINUED | OUTPATIENT
Start: 2024-06-07 | End: 2024-06-10 | Stop reason: HOSPADM

## 2024-06-06 RX ORDER — MAGNESIUM GLYCINATE 100 MG
100 CAPSULE ORAL DAILY
Status: DISCONTINUED | OUTPATIENT
Start: 2024-06-07 | End: 2024-06-08

## 2024-06-06 RX ORDER — TRAZODONE HYDROCHLORIDE 50 MG/1
25 TABLET ORAL NIGHTLY PRN
Status: DISCONTINUED | OUTPATIENT
Start: 2024-06-06 | End: 2024-06-10 | Stop reason: HOSPADM

## 2024-06-06 RX ORDER — ACETAMINOPHEN 325 MG/1
650 TABLET ORAL EVERY 4 HOURS PRN
Status: DISCONTINUED | OUTPATIENT
Start: 2024-06-06 | End: 2024-06-10 | Stop reason: HOSPADM

## 2024-06-06 RX ORDER — POTASSIUM CHLORIDE 20 MEQ/1
20 TABLET, EXTENDED RELEASE ORAL DAILY
Status: DISCONTINUED | OUTPATIENT
Start: 2024-06-07 | End: 2024-06-10 | Stop reason: HOSPADM

## 2024-06-06 RX ORDER — ALUMINUM HYDROXIDE, MAGNESIUM HYDROXIDE, AND SIMETHICONE 2400; 240; 2400 MG/30ML; MG/30ML; MG/30ML
30 SUSPENSION ORAL EVERY 6 HOURS PRN
Status: DISCONTINUED | OUTPATIENT
Start: 2024-06-06 | End: 2024-06-10 | Stop reason: HOSPADM

## 2024-06-06 RX ORDER — ALBUTEROL SULFATE 0.83 MG/ML
2.5 SOLUTION RESPIRATORY (INHALATION) EVERY 6 HOURS PRN
Status: DISCONTINUED | OUTPATIENT
Start: 2024-06-06 | End: 2024-06-10 | Stop reason: HOSPADM

## 2024-06-06 RX ORDER — MAGNESIUM HYDROXIDE 2400 MG/10ML
10 SUSPENSION ORAL DAILY PRN
Status: DISCONTINUED | OUTPATIENT
Start: 2024-06-06 | End: 2024-06-10 | Stop reason: HOSPADM

## 2024-06-06 RX ORDER — ASPIRIN 81 MG/1
81 TABLET ORAL DAILY
Status: DISCONTINUED | OUTPATIENT
Start: 2024-06-07 | End: 2024-06-10 | Stop reason: HOSPADM

## 2024-06-06 RX ORDER — HYDROXYZINE HYDROCHLORIDE 25 MG/1
25 TABLET, FILM COATED ORAL EVERY 6 HOURS PRN
Status: DISCONTINUED | OUTPATIENT
Start: 2024-06-06 | End: 2024-06-10 | Stop reason: HOSPADM

## 2024-06-06 RX ORDER — OLANZAPINE 10 MG/2ML
5 INJECTION, POWDER, FOR SOLUTION INTRAMUSCULAR EVERY 6 HOURS PRN
Status: DISCONTINUED | OUTPATIENT
Start: 2024-06-06 | End: 2024-06-10 | Stop reason: HOSPADM

## 2024-06-06 RX ORDER — TRAMADOL HYDROCHLORIDE 50 MG/1
50 TABLET ORAL 2 TIMES DAILY PRN
Status: DISCONTINUED | OUTPATIENT
Start: 2024-06-06 | End: 2024-06-10 | Stop reason: HOSPADM

## 2024-06-06 SDOH — ECONOMIC STABILITY: FOOD INSECURITY: WITHIN THE PAST 12 MONTHS, THE FOOD YOU BOUGHT JUST DIDN'T LAST AND YOU DIDN'T HAVE MONEY TO GET MORE.: NEVER TRUE

## 2024-06-06 SDOH — HEALTH STABILITY: MENTAL HEALTH: SUICIDAL BEHAVIOR (LIFETIME): NO

## 2024-06-06 SDOH — ECONOMIC STABILITY: INCOME INSECURITY: IN THE PAST 12 MONTHS, HAS THE ELECTRIC, GAS, OIL, OR WATER COMPANY THREATENED TO SHUT OFF SERVICE IN YOUR HOME?: NO

## 2024-06-06 SDOH — HEALTH STABILITY: MENTAL HEALTH: HAVE YOU ACTUALLY HAD ANY THOUGHTS OF KILLING YOURSELF?: YES

## 2024-06-06 SDOH — SOCIAL STABILITY: SOCIAL INSECURITY
WITHIN THE LAST YEAR, HAVE YOU BEEN KICKED, HIT, SLAPPED, OR OTHERWISE PHYSICALLY HURT BY YOUR PARTNER OR EX-PARTNER?: PATIENT DECLINED

## 2024-06-06 SDOH — HEALTH STABILITY: MENTAL HEALTH: DEPRESSION SYMPTOMS: FEELINGS OF HELPLESSNESS;FEELINGS OF HOPELESSESS;INCREASED IRRITABILITY

## 2024-06-06 SDOH — HEALTH STABILITY: MENTAL HEALTH: WISH TO BE DEAD (PAST 1 MONTH): YES

## 2024-06-06 SDOH — HEALTH STABILITY: MENTAL HEALTH
STRESS IS WHEN SOMEONE FEELS TENSE, NERVOUS, ANXIOUS, OR CAN'T SLEEP AT NIGHT BECAUSE THEIR MIND IS TROUBLED. HOW STRESSED ARE YOU?: VERY MUCH

## 2024-06-06 SDOH — ECONOMIC STABILITY: INCOME INSECURITY: IN THE LAST 12 MONTHS, WAS THERE A TIME WHEN YOU WERE NOT ABLE TO PAY THE MORTGAGE OR RENT ON TIME?: NO

## 2024-06-06 SDOH — HEALTH STABILITY: MENTAL HEALTH: IN THE PAST FEW WEEKS, HAVE YOU FELT THAT YOU OR YOUR FAMILY WOULD BE BETTER OFF IF YOU WERE DEAD?: YES

## 2024-06-06 SDOH — SOCIAL STABILITY: SOCIAL NETWORK: HOW OFTEN DO YOU ATTEND CHURCH OR RELIGIOUS SERVICES?: PATIENT DECLINED

## 2024-06-06 SDOH — HEALTH STABILITY: MENTAL HEALTH: IN THE PAST WEEK, HAVE YOU BEEN HAVING THOUGHTS ABOUT KILLING YOURSELF?: YES

## 2024-06-06 SDOH — SOCIAL STABILITY: SOCIAL NETWORK: ARE YOU MARRIED, WIDOWED, DIVORCED, SEPARATED, NEVER MARRIED, OR LIVING WITH A PARTNER?: WIDOWED

## 2024-06-06 SDOH — HEALTH STABILITY: MENTAL HEALTH: HOW MANY STANDARD DRINKS CONTAINING ALCOHOL DO YOU HAVE ON A TYPICAL DAY?: 3 OR 4

## 2024-06-06 SDOH — HEALTH STABILITY: MENTAL HEALTH: HAVE YOU EVER DONE ANYTHING, STARTED TO DO ANYTHING, OR PREPARED TO DO ANYTHING TO END YOUR LIFE?: NO

## 2024-06-06 SDOH — SOCIAL STABILITY: SOCIAL INSECURITY: WITHIN THE LAST YEAR, HAVE YOU BEEN AFRAID OF YOUR PARTNER OR EX-PARTNER?: PATIENT DECLINED

## 2024-06-06 SDOH — HEALTH STABILITY: MENTAL HEALTH: EXPERIENCED ANY OF THE FOLLOWING LIFE EVENTS: DEATH OF FAMILY/FRIEND

## 2024-06-06 SDOH — ECONOMIC STABILITY: HOUSING INSECURITY
IN THE LAST 12 MONTHS, WAS THERE A TIME WHEN YOU DID NOT HAVE A STEADY PLACE TO SLEEP OR SLEPT IN A SHELTER (INCLUDING NOW)?: NO

## 2024-06-06 SDOH — SOCIAL STABILITY: SOCIAL INSECURITY: HAS ANYONE EVER THREATENED TO HURT YOUR FAMILY OR YOUR PETS?: NO

## 2024-06-06 SDOH — SOCIAL STABILITY: SOCIAL NETWORK: HOW OFTEN DO YOU GET TOGETHER WITH FRIENDS OR RELATIVES?: MORE THAN THREE TIMES A WEEK

## 2024-06-06 SDOH — HEALTH STABILITY: MENTAL HEALTH: HOW OFTEN DO YOU HAVE A DRINK CONTAINING ALCOHOL?: 2-3 TIMES A WEEK

## 2024-06-06 SDOH — SOCIAL STABILITY: SOCIAL INSECURITY: DO YOU FEEL UNSAFE GOING BACK TO THE PLACE WHERE YOU ARE LIVING?: NO

## 2024-06-06 SDOH — SOCIAL STABILITY: SOCIAL INSECURITY: HAVE YOU HAD ANY THOUGHTS OF HARMING ANYONE ELSE?: NO

## 2024-06-06 SDOH — SOCIAL STABILITY: SOCIAL NETWORK
IN A TYPICAL WEEK, HOW MANY TIMES DO YOU TALK ON THE PHONE WITH FAMILY, FRIENDS, OR NEIGHBORS?: MORE THAN THREE TIMES A WEEK

## 2024-06-06 SDOH — SOCIAL STABILITY: SOCIAL INSECURITY: DOES ANYONE TRY TO KEEP YOU FROM HAVING/CONTACTING OTHER FRIENDS OR DOING THINGS OUTSIDE YOUR HOME?: NO

## 2024-06-06 SDOH — HEALTH STABILITY: MENTAL HEALTH: HOW OFTEN DO YOU HAVE 6 OR MORE DRINKS ON ONE OCCASION?: WEEKLY

## 2024-06-06 SDOH — HEALTH STABILITY: MENTAL HEALTH: ACTIVE SUICIDAL IDEATION WITH SPECIFIC PLAN AND INTENT (PAST 1 MONTH): NO

## 2024-06-06 SDOH — HEALTH STABILITY: MENTAL HEALTH: SUICIDE ASSESSMENT: ADULT (C-SSRS)

## 2024-06-06 SDOH — HEALTH STABILITY: MENTAL HEALTH
HAVE YOU STARTED TO WORK OUT OR WORKED OUT THE DETAILS OF HOW TO KILL YOURSELF? DO YOU INTENT TO CARRY OUT THIS PLAN?: YES

## 2024-06-06 SDOH — SOCIAL STABILITY: SOCIAL NETWORK: HOW OFTEN DO YOU ATTENT MEETINGS OF THE CLUB OR ORGANIZATION YOU BELONG TO?: PATIENT DECLINED

## 2024-06-06 SDOH — HEALTH STABILITY: PHYSICAL HEALTH
ON AVERAGE, HOW MANY DAYS PER WEEK DO YOU ENGAGE IN MODERATE TO STRENUOUS EXERCISE (LIKE A BRISK WALK)?: PATIENT UNABLE TO ANSWER

## 2024-06-06 SDOH — SOCIAL STABILITY: SOCIAL INSECURITY: ABUSE: ADULT

## 2024-06-06 SDOH — SOCIAL STABILITY: SOCIAL INSECURITY
WITHIN THE LAST YEAR, HAVE YOU BEEN HUMILIATED OR EMOTIONALLY ABUSED IN OTHER WAYS BY YOUR PARTNER OR EX-PARTNER?: PATIENT DECLINED

## 2024-06-06 SDOH — ECONOMIC STABILITY: FOOD INSECURITY: WITHIN THE PAST 12 MONTHS, YOU WORRIED THAT YOUR FOOD WOULD RUN OUT BEFORE YOU GOT MONEY TO BUY MORE.: NEVER TRUE

## 2024-06-06 SDOH — HEALTH STABILITY: MENTAL HEALTH: ANXIETY SYMPTOMS: NO PROBLEMS REPORTED OR OBSERVED.

## 2024-06-06 SDOH — HEALTH STABILITY: MENTAL HEALTH: BEHAVIORS/MOOD: COOPERATIVE;CALM

## 2024-06-06 SDOH — HEALTH STABILITY: MENTAL HEALTH: IN THE PAST FEW WEEKS, HAVE YOU WISHED YOU WERE DEAD?: YES

## 2024-06-06 SDOH — ECONOMIC STABILITY: HOUSING INSECURITY: FEELS SAFE LIVING IN HOME: YES

## 2024-06-06 SDOH — HEALTH STABILITY: MENTAL HEALTH: HAVE YOU WISHED YOU WERE DEAD OR WISHED YOU COULD GO TO SLEEP AND NOT WAKE UP?: YES

## 2024-06-06 SDOH — ECONOMIC STABILITY: INCOME INSECURITY: HOW HARD IS IT FOR YOU TO PAY FOR THE VERY BASICS LIKE FOOD, HOUSING, MEDICAL CARE, AND HEATING?: NOT VERY HARD

## 2024-06-06 SDOH — HEALTH STABILITY: MENTAL HEALTH: HAVE YOU EVER TRIED TO KILL YOURSELF?: NO

## 2024-06-06 SDOH — HEALTH STABILITY: MENTAL HEALTH: HAVE YOU HAD THESE THOUGHTS AND HAD SOME INTENTION OF ACTING ON THEM?: YES

## 2024-06-06 SDOH — SOCIAL STABILITY: SOCIAL INSECURITY
WITHIN THE LAST YEAR, HAVE TO BEEN RAPED OR FORCED TO HAVE ANY KIND OF SEXUAL ACTIVITY BY YOUR PARTNER OR EX-PARTNER?: PATIENT DECLINED

## 2024-06-06 SDOH — SOCIAL STABILITY: SOCIAL INSECURITY: ARE THERE ANY APPARENT SIGNS OF INJURIES/BEHAVIORS THAT COULD BE RELATED TO ABUSE/NEGLECT?: NO

## 2024-06-06 SDOH — HEALTH STABILITY: MENTAL HEALTH: NON-SPECIFIC ACTIVE SUICIDAL THOUGHTS (PAST 1 MONTH): YES

## 2024-06-06 SDOH — SOCIAL STABILITY: SOCIAL INSECURITY: ARE YOU OR HAVE YOU BEEN THREATENED OR ABUSED PHYSICALLY, EMOTIONALLY, OR SEXUALLY BY ANYONE?: NO

## 2024-06-06 SDOH — HEALTH STABILITY: PHYSICAL HEALTH: ON AVERAGE, HOW MANY MINUTES DO YOU ENGAGE IN EXERCISE AT THIS LEVEL?: PATIENT UNABLE TO ANSWER

## 2024-06-06 SDOH — HEALTH STABILITY: MENTAL HEALTH: ACTIVE SUICIDAL IDEATION WITH SOME INTENT TO ACT, WITHOUT SPECIFIC PLAN (PAST 1 MONTH): YES

## 2024-06-06 SDOH — SOCIAL STABILITY: SOCIAL INSECURITY: HAVE YOU HAD THOUGHTS OF HARMING ANYONE ELSE?: NO

## 2024-06-06 SDOH — ECONOMIC STABILITY: HOUSING INSECURITY: IN THE LAST 12 MONTHS, HOW MANY PLACES HAVE YOU LIVED?: 2

## 2024-06-06 SDOH — SOCIAL STABILITY: SOCIAL INSECURITY: WERE YOU ABLE TO COMPLETE ALL THE BEHAVIORAL HEALTH SCREENINGS?: YES

## 2024-06-06 SDOH — SOCIAL STABILITY: SOCIAL NETWORK
DO YOU BELONG TO ANY CLUBS OR ORGANIZATIONS SUCH AS CHURCH GROUPS UNIONS, FRATERNAL OR ATHLETIC GROUPS, OR SCHOOL GROUPS?: PATIENT DECLINED

## 2024-06-06 SDOH — HEALTH STABILITY: MENTAL HEALTH: HAVE YOU BEEN THINKING ABOUT HOW YOU MIGHT DO THIS?: YES

## 2024-06-06 SDOH — HEALTH STABILITY: MENTAL HEALTH: ARE YOU HAVING THOUGHTS OF KILLING YOURSELF RIGHT NOW?: NO

## 2024-06-06 SDOH — SOCIAL STABILITY: SOCIAL INSECURITY: DO YOU FEEL ANYONE HAS EXPLOITED OR TAKEN ADVANTAGE OF YOU FINANCIALLY OR OF YOUR PERSONAL PROPERTY?: NO

## 2024-06-06 ASSESSMENT — COLUMBIA-SUICIDE SEVERITY RATING SCALE - C-SSRS
5. HAVE YOU STARTED TO WORK OUT OR WORKED OUT THE DETAILS OF HOW TO KILL YOURSELF? DO YOU INTEND TO CARRY OUT THIS PLAN?: NO
2. HAVE YOU ACTUALLY HAD ANY THOUGHTS OF KILLING YOURSELF?: YES
1. SINCE LAST CONTACT, HAVE YOU WISHED YOU WERE DEAD OR WISHED YOU COULD GO TO SLEEP AND NOT WAKE UP?: YES
5. HAVE YOU STARTED TO WORK OUT OR WORKED OUT THE DETAILS OF HOW TO KILL YOURSELF? DO YOU INTEND TO CARRY OUT THIS PLAN?: NO
2. HAVE YOU ACTUALLY HAD ANY THOUGHTS OF KILLING YOURSELF?: YES
6. HAVE YOU EVER DONE ANYTHING, STARTED TO DO ANYTHING, OR PREPARED TO DO ANYTHING TO END YOUR LIFE?: NO
6. HAVE YOU EVER DONE ANYTHING, STARTED TO DO ANYTHING, OR PREPARED TO DO ANYTHING TO END YOUR LIFE?: NO
1. SINCE LAST CONTACT, HAVE YOU WISHED YOU WERE DEAD OR WISHED YOU COULD GO TO SLEEP AND NOT WAKE UP?: YES
2. HAVE YOU ACTUALLY HAD ANY THOUGHTS OF KILLING YOURSELF?: NO
1. IN THE PAST MONTH, HAVE YOU WISHED YOU WERE DEAD OR WISHED YOU COULD GO TO SLEEP AND NOT WAKE UP?: YES
4. HAVE YOU HAD THESE THOUGHTS AND HAD SOME INTENTION OF ACTING ON THEM?: YES
5. HAVE YOU STARTED TO WORK OUT OR WORKED OUT THE DETAILS OF HOW TO KILL YOURSELF? DO YOU INTEND TO CARRY OUT THIS PLAN?: NO
6. HAVE YOU EVER DONE ANYTHING, STARTED TO DO ANYTHING, OR PREPARED TO DO ANYTHING TO END YOUR LIFE?: NO

## 2024-06-06 ASSESSMENT — PATIENT HEALTH QUESTIONNAIRE - PHQ9
1. LITTLE INTEREST OR PLEASURE IN DOING THINGS: NEARLY EVERY DAY
2. FEELING DOWN, DEPRESSED OR HOPELESS: NEARLY EVERY DAY
SUM OF ALL RESPONSES TO PHQ9 QUESTIONS 1 & 2: 6

## 2024-06-06 ASSESSMENT — LIFESTYLE VARIABLES
TOTAL_SCORE: 1
CIWA TOTAL SCORE: 0
SKIP TO QUESTIONS 9-10: 0
PRESCIPTION_ABUSE_PAST_12_MONTHS: NO
NAUSEA AND VOMITING: NO NAUSEA AND NO VOMITING
ANXIETY: NO ANXIETY, AT EASE
ORIENTATION AND CLOUDING OF SENSORIUM: ORIENTED AND CAN DO SERIAL ADDITIONS
AUDITORY DISTURBANCES: NOT PRESENT
AGITATION: NORMAL ACTIVITY
VISUAL DISTURBANCES: NOT PRESENT
AUDIT-C TOTAL SCORE: 7
TREMOR: NO TREMOR
HEADACHE, FULLNESS IN HEAD: NOT PRESENT
PRESCIPTION_ABUSE_PAST_12_MONTHS: NO
SUBSTANCE_ABUSE_PAST_12_MONTHS: NO
PAROXYSMAL SWEATS: NO SWEAT VISIBLE
SUBSTANCE_ABUSE_PAST_12_MONTHS: NO

## 2024-06-06 ASSESSMENT — ACTIVITIES OF DAILY LIVING (ADL)
TOILETING: INDEPENDENT
HEARING - RIGHT EAR: FUNCTIONAL
WALKS IN HOME: INDEPENDENT
JUDGMENT_ADEQUATE_SAFELY_COMPLETE_DAILY_ACTIVITIES: YES
DRESSING YOURSELF: NEEDS ASSISTANCE
GROOMING: INDEPENDENT
LACK_OF_TRANSPORTATION: NO
PATIENT'S MEMORY ADEQUATE TO SAFELY COMPLETE DAILY ACTIVITIES?: YES
FEEDING YOURSELF: INDEPENDENT
HEARING - LEFT EAR: FUNCTIONAL
ADEQUATE_TO_COMPLETE_ADL: YES
BATHING: NEEDS ASSISTANCE

## 2024-06-06 ASSESSMENT — PAIN - FUNCTIONAL ASSESSMENT
PAIN_FUNCTIONAL_ASSESSMENT: 0-10
PAIN_FUNCTIONAL_ASSESSMENT: 0-10

## 2024-06-06 ASSESSMENT — PAIN DESCRIPTION - LOCATION: LOCATION: GENERALIZED

## 2024-06-06 ASSESSMENT — PAIN SCALES - GENERAL
PAINLEVEL_OUTOF10: 5 - MODERATE PAIN
PAINLEVEL_OUTOF10: 6

## 2024-06-06 NOTE — PROGRESS NOTES
Pt care accepted from Dr Quesada at 1425 with the pt medically cleared and awaiting placement for inpt MH care      The patient is a 79-year-old male presenting to the emergency department for evaluation of pression with suicidal ideation.  The patient reportedly does have a long history of depression but it has been gradually worsening over the past 2 to 3 weeks.  He states that he does have a plan to either kill himself with a gun and/or to sit in his truck with the motor running to kill himself.  No homicidal ideation.  No hallucinations.  No headache or visual changes.  No chest pain or shortness of breath.  No abdominal pain.  No nausea vomiting.  No diarrhea or constipation.  No urinary complaints.  All pertinent positives and negatives are recorded above.  All other systems reviewed and otherwise negative.  Vital signs within normal limits.  Physical exam with a well-nourished well-developed male with disheveled appearance but no evidence of acute distress.  HEENT exam within normal limits.  He has no evidence of airway compromise or respiratory distress.  Abdominal exam is benign.  He has no gross motor, neurologic or vascular deficits on exam.      EKG with normal sinus rhythm at 68 bpm, right bundle branch block pattern, normal axis, normal voltage, normal ST segment, and inverted T wave in lead III      Diagnostic labs with mild anemia and mild electrolyte imbalance but otherwise unremarkable.      Crisis intervention was consulted and will attempt placement for inpatient mental health care/[facility]      The patient was transferred to [facility] for inpatient mental health care.        Impression/diagnosis  Depression with suicidal ideation      I independently reviewed the results of the EKG and diagnostic labs

## 2024-06-06 NOTE — ED PROVIDER NOTES
"HPI   Chief Complaint   Patient presents with    Suicidal     Bib ems from [facility] and rehab snf, pink slip written by [facility]. Patient had recent abd surgery and has been making suicidal statements over the last 2-3 weeks. Patient told family members what to do with his belongings, patient had a plan to kill himself with his gun and / or sit in his car in the garage. When asked, patient states \"I just want to be left alone\", denies any prior attempt. Patient is supposed to be DC from SNF on Saturday.        HPI     Patient is a 79-year-old male presenting for evaluation of suicidal ideation.  Patient was pink slipped by [facility].  According to the pink slip, patient has been expressing SI for 2 to 3 weeks.  He shared with his family what he would like down with his belongings after he is .  He stated his plan was to shoot himself and family has secured his guns.  His other plan is to sit in his truck with a running.  Patient states he does not want to be a burden on his family.  Patient was reassessed yesterday by [facility] and he stated he had several ways to kill himself.  He states he would give himself 1 week to physically recover to his previous life and if he is unable to do those things he will kill himself because he does not want to be a burden to his family.  On questioning, patient does admit that he has been having suicidal thoughts \" for a while,\"  although he does deny a plan to me.  He denies auditory or visual hallucinations.  He denies any somatic complaints.  He denies drug, tobacco or alcohol use.               [place] Coma Scale Score: 15                     Patient History   Past Medical History:   Diagnosis Date    Acute cholecystitis 05/15/2024    Anxiety     CHF (congestive heart failure) (Multi)     GERD (gastroesophageal reflux disease)     Hypertension      Past Surgical History:   Procedure Laterality Date    CHOLECYSTECTOMY N/A 2024    " [place]     No family history on file.  Social History     Tobacco Use    Smoking status: Former     Types: Cigarettes    Smokeless tobacco: Former     Types: Chew   Vaping Use    Vaping status: Never Used   Substance Use Topics    Alcohol use: Yes     Alcohol/week: 14.0 standard drinks of alcohol     Types: 14 Cans of beer per week     Comment: quit 2 weeks ago was drinking 4-5 beers a day    Drug use: Never       Physical Exam   ED Triage Vitals [06/06/24 1329]   Temperature Heart Rate Respirations BP   36.8 °C (98.2 °F) 68 18 136/79      Pulse Ox Temp Source Heart Rate Source Patient Position   96 % Temporal -- --      BP Location FiO2 (%)     -- --       Physical Exam  Vitals and nursing note reviewed.   Constitutional:       Appearance: Normal appearance.   HENT:      Head: Normocephalic and atraumatic.      Nose: Nose normal.   Eyes:      Extraocular Movements: Extraocular movements intact.      Conjunctiva/sclera: Conjunctivae normal.      Pupils: Pupils are equal, round, and reactive to light.   Cardiovascular:      Rate and Rhythm: Normal rate and regular rhythm.   Pulmonary:      Effort: Pulmonary effort is normal.      Breath sounds: Normal breath sounds.   Abdominal:      General: Abdomen is flat. Bowel sounds are normal.      Palpations: Abdomen is soft.   Musculoskeletal:         General: Normal range of motion.      Cervical back: Normal range of motion and neck supple.   Skin:     General: Skin is warm and dry.   Neurological:      Mental Status: He is alert.         ED Course & MDM   ED Course as of 06/06/24 1637   Thu Jun 06, 2024   1344 EKG interpretation: Normal sinus rhythm, rate 68.  Right bundle branch block.  Borderline leftward axis.  No ST or T wave abnormalities. [ML]      ED Course User Index  [ML] Aung Quesada MD         Diagnoses as of 06/06/24 1637   Suicidal ideation       Medical Decision Making    Parts of this chart have been completed using voice recognition software. Please  excuse any errors of transcription. Despite the medical decision making time stamp above-my medical decision making has taken place during the patient's entire visit. My thought process and reason for plan has been formulated from the time that I saw the patient until the time of disposition and is not specific to one specific moment during their visit and furthermore my MDM encompasses this entire chart and not only this text box.      HPI: Detailed above.    Exam: A medically appropriate exam performed, outlined above, given the known history and presentation.    History obtained from: [place] slip, patient    Social Determinants of Health considered during this visit: Coming from a skilled nursing facility    EKG interpreted by my attending physician, reviewed by myself.    Labs Reviewed   CBC WITH AUTO DIFFERENTIAL - Abnormal       Result Value    WBC 4.5      nRBC 0.0      RBC 3.84 (*)     Hemoglobin 11.8 (*)     Hematocrit 36.7 (*)     MCV 96      MCH 30.7      MCHC 32.2      RDW 12.6      Platelets 190      Neutrophils % 46.2      Immature Granulocytes %, Automated 0.9      Lymphocytes % 37.3      Monocytes % 14.7      Eosinophils % 0.2      Basophils % 0.7      Neutrophils Absolute 2.07      Immature Granulocytes Absolute, Automated 0.04      Lymphocytes Absolute 1.67      Monocytes Absolute 0.66      Eosinophils Absolute 0.01      Basophils Absolute 0.03     COMPREHENSIVE METABOLIC PANEL   DRUG SCREEN,URINE   ALCOHOL   URINALYSIS WITH REFLEX MICROSCOPIC   SARS-COV-2 PCR     No orders to display     Medications - No data to display    Differential diagnoses considered for this visit include: Suicidal ideation versus depression    Considerations/further MDM:    Patient is a 79-year-old male presenting for evaluation of suicidal ideation.  Vital signs hemodynamically stable within normal limits.  Physical examination appreciated well-nourished well-developed gentleman in no acute distress. CBC and CMP were within  normal limits.  Drug screen was negative.  Urinalysis was negative for infection.  COVID testing negative.  Alcohol level negative.  Patient considered medically cleared for placement.    Patient was seen in conjunction with attending physician Dr. Aung Quesada  Patient's history, physical exam, diagnostic studies, and treatment plan were discussed thoroughly.  Procedure  Procedures     Bere Garcia PA-C  06/10/24 1125

## 2024-06-06 NOTE — PROGRESS NOTES
"EPAT - Social Work Psychiatric Assessment    Arrival Details  Mode of Arrival: Ambulance  Admission Source: Other (Comment) ([facility])  Admission Type: Involuntary (pink slip by [employer])  EPAT Assessment Start Date: 06/06/24  EPAT Assessment Start Time: 1438  Name of : CAMERON Pepper    History of Present Illness  Admission Reason: Pt is a 80y/o male presenting to [facility] ED for SI.  HPI: Pt chart, triage and provider notes reviewed prior to assessment, triage assessment reflects \"high risk\". Pt reportedly has been making suicidal statements to family and providers for the last couple weeks. Pt reports feeling hopeless due to medical issues, such as pain and a recent gallbladder surgery. Pt has shared plans to shoot himself or sit in his truck with it running. Pt has been put on medication by NP w/ [employer], however, SI has persisted. Pt minimizing of statements in ED stating \"I retract what I said because I just want to go home\". Pt denies HI/AH/VH. Pt denies previous mental health treatment or diagnoses.    SW Readmission Information   Readmission within 30 Days: No    Psychiatric Symptoms  Anxiety Symptoms: No problems reported or observed.  Depression Symptoms: Feelings of helplessness, Feelings of hopelessess, Increased irritability  Lore Symptoms: No problems reported or observed.    Psychosis Symptoms  Hallucination Type: No problems reported or observed.  Delusion Type: No problems reported or observed.    Additional Symptoms - Adult  Generalized Anxiety Disorder: No problems reported or observed.  Obsessive Compulsive Disorder: No problems reported or observed.  Panic Attack: No problems reported or observed.  Post Traumatic Stress Disorder: No problems reported or observed.  Delirium: No problems reported or observed.    Past Psychiatric History/Meds/Treatments  Past Psychiatric History: Pt denies  Past Psychiatric Meds/Treatments: Pt has been seen by a NP w/ " [facility] during his rehab stay. She indicates she put him on pristiq. Pt denies any inpatient admissions.  Past Violence/Victimization History: Denies    Current Mental Health Contacts   Name/Phone Number: N/A  Provider Name/Phone Number: Viola Rivero NP-[facility]  Provider Last Appointment Date: 6/5/24    Support System: Immediate family (pt daughters are involved and supportive)    Living Arrangement: Lives with someone (lives with one of his daughters)    Home Safety  Feels Safe Living in Home: Yes  Potentially Unsafe Housing Conditions: Unable to Assess  Home Safety : No concerns reported.              Social/Cultural History  Social History: Pt lives at home with one of his daughters. Pt other daughter is involved and is supportive. Pt has been residing at [facility] while recovering from a recent surgery and is anticipating a discharge home this weekend.    Legal  Legal Considerations: Patient/ Family Ability to Make Healthcare Decisions  Criminal Activity/ Legal Involvement Pertinent to Current Situation/ Hospitalization: None reported  Legal Concerns: None reported    Drug Screening  Have you used any substances (canabis, cocaine, heroin, hallucinogens, inhalants, etc.) in the past 12 months?: No  Have you used any prescription drugs other than prescribed in the past 12 months?: No  Is a toxicology screen needed?: Yes         Psychosocial  Psychosocial (WDL): Exceptions to WDL  Behaviors/Mood: Cooperative, Irritable, Flat affect, Restless  Affect: Appropriate to circumstances    Orientation  Orientation Level: Oriented X4, Appropriate for developmental age    General Appearance  Motor Activity: Unremarkable  Speech Pattern: Other (Comment) (unremarkable)  General Attitude: Apathetic, Attentive, Cooperative, Guarded  Appearance/Hygiene: Unremarkable    Thought Process  Coherency: [place] thinking  Content: Unremarkable  Delusions: Other (Comment) (none reported)  Perception: Not  "altered  Hallucination: None  Judgment/Insight: Poor  Confusion: None  Cognition: Appropriate attention/concentration, Appropriate for developmental age, Poor judgement, Poor safety awareness         Risk Factors  Self Harm/Suicidal Ideation Plan: Pt has reportedly been making suicidal statements to various people and admits to this, however, is \"retracting\" these statements now.  Previous Self Harm/Suicidal Plans: Pt states \"of course, everyone thinks about killing themselves\". Denies any previous attempts.  Risk Factors: Male,  history or weapons training, Presence of firearms in home    Violence Risk Assessment  Assessment of Violence: None noted  Thoughts of Harm to Others: No    Ability to Assess Risk Screen  Risk Screen - Ability to Assess: Able to be screened  Ask Suicide-Screening Questions  1. In the past few weeks, have you wished you were dead?: Yes  2. In the past few weeks, have you felt that you or your family would be better off if you were dead?: Yes  3. In the past week, have you been having thoughts about killing yourself?: Yes  4. Have you ever tried to kill yourself?: No  5. Are you having thoughts of killing yourself right now?: No  Calculated Risk Score: Potential Risk  [place] Suicide Severity Rating Scale (Screener/Recent Self-Report)  1. Wish to be Dead (Past 1 Month): Yes  2. Non-Specific Active Suicidal Thoughts (Past 1 Month): Yes  3. Active Suicidal Ideation with any Methods (Not Plan) Without Intent to Act (Past 1 Month): Yes  4. Active Suicidal Ideation with Some Intent to Act, Without Specific Plan (Past 1 Month): Yes  5. Active Suicidal Ideation with Specific Plan and Intent (Past 1 Month): No  6. Suicidal Behavior (Lifetime): No  Calculated C-SSRS Risk Score (Lifetime/Recent): High Risk  Step 1: Risk Factors  Current & Past Psychiatric Dx: Mood disorder  Presenting Symptoms: Hopelessness or despair  Precipitants/Stressors: Chronic physical pain or other acute medical " problem (e.g. CNS disorders), Perceived burden on others  Change in Treatment: Change in provider or treament (i.e., medications, psychotherapy, milieu)  Access to Lethal Methods :  (Pt guns were removed his home recently due to SI.)  Step 2: Protective Factors   Protective Factors External: Supportive social network or family or friends  Step 3: Suicidal Ideation Intensity  Most Severe Suicidal Ideation Identified: SI w/ various plans  How Many Times Have You Had These Thoughts: Many times each day  When You Have the Thoughts How Long do They Last : 4-8 hours/most of the day  Could/Can You Stop Thinking About Killing Yourself or Wanting to Die if You Want to: Can control thoughts with some difficulty  Are There Things - Anyone or Anything - That Stopped You From Wanting to Die or Acting on: Does not apply  What Sort of Reasons Did You Have For Thinking About Wanting to Die or Killing Yourself: Completely to end or stop the pain (you couldn't go on living with the pain or how you were feeling)  Total Score: 17  Step 5: Documentation  Risk Level: Moderate suicide risk    Psychiatric Impression and Plan of Care  Assessment and Plan: Collateral obtained from NP Viola Rivero. Met [facility] w/ pt for assessment. Pt is a 78y/o male currently residing at [facility] for rehab following a recent surgery (anticipated discharge is this weekend). Pt has been making suicidal comments to family and providers for the last couple weeks. Pt reports that he has had a hard life and is feeling hopeless. Pt now denying SI to this SW, stating that he just wants to go home. Pt has voiced plans to shoot himself or sit in his truck w/ it running. Pt feels like he is a burden to his family and has began talking about what to do with his belongings when he's dead. Pt has met w/ psych at rehab while staying there and they have put him on prisitq with some improvement but he has continued to voice SI. Pt denies any previous mental health treatment.  "Pt reports history of SI throughout his lifetime and states that \"everybody thinks about killing themselves\". Pt is cooperative but presents w/ irritable mood. Pt denies HI/AH/VH. Pt would benefit from inpatient admission for safety and stabilization.  Specific Resources Provided to Patient: N/A  CM Notified: N/A  PHP/IOP Recommended: N/A  Specific Information Provided for PHP/IOP: N/A  Plan Comments: Diagnostic impression: adjustment disorder w/ depressed mood    Outcome/Disposition  Patient's Perception of Outcome Achieved: pt does not agree w/ recommendation, however, is cooperative  Assessment, Recommendations and Risk Level Reviewed with: Dr Quesada  Contact Name: Viola Rivero  Contact Number(s): [phone]  Contact Relationship: psych NP w/ [facility]  EPAT Assessment Completed Date: 06/06/24  EPAT Assessment Completed Time: 1962 3310 Pt accepted to [facility] by Dr Gutierrez. Pink slip addressed, copy faxed, clinicals given to RN for report.       "

## 2024-06-06 NOTE — ED PROVIDER NOTES
Supervisory note:  Patient seen in conjunction with NERISSA Edwards.    Patient presents with suicidal ideation.  A pink slip was completed by his outpatient provider due to suicidal statements.  He states that when he is laying they are dying, he has been having thoughts of killing himself.  He states that he has lots of ideas of how to complete suicide.  He reports that he is not having any hallucinations.  He is currently in rehab following gallbladder surgery.  He had plan to go home on Saturday.  Patient is cooperative at this time.  Flat affect.  Unremarkable vital signs.    Patient is medically cleared and awaiting placement at this time.    I personally saw the patient and made/approved the management plan and take responsiblity for the patient management.  Parts of this chart were completed with dictation software, please excuse any errors in transcription.     Aung Quesada MD  06/06/24 8532

## 2024-06-07 PROBLEM — E88.09 HYPOALBUMINEMIA: Status: ACTIVE | Noted: 2024-06-07

## 2024-06-07 PROBLEM — F33.2 SEVERE RECURRENT MAJOR DEPRESSION WITHOUT PSYCHOTIC FEATURES (MULTI): Status: ACTIVE | Noted: 2024-06-07

## 2024-06-07 PROBLEM — I10 PRIMARY HYPERTENSION: Status: ACTIVE | Noted: 2024-06-07

## 2024-06-07 PROBLEM — R45.851 SUICIDAL IDEATIONS: Status: ACTIVE | Noted: 2024-06-07

## 2024-06-07 LAB
CHOLEST SERPL-MCNC: 80 MG/DL (ref 133–200)
CHOLEST/HDLC SERPL: 2.6 {RATIO}
EST. AVERAGE GLUCOSE BLD GHB EST-MCNC: 120 MG/DL
HBA1C MFR BLD: 5.8 %
HDLC SERPL-MCNC: 31 MG/DL
LDLC SERPL CALC-MCNC: 27 MG/DL (ref 65–130)
TRIGL SERPL-MCNC: 112 MG/DL (ref 40–150)

## 2024-06-07 PROCEDURE — 99221 1ST HOSP IP/OBS SF/LOW 40: CPT | Performed by: INTERNAL MEDICINE

## 2024-06-07 PROCEDURE — 99222 1ST HOSP IP/OBS MODERATE 55: CPT | Performed by: PSYCHIATRY & NEUROLOGY

## 2024-06-07 PROCEDURE — 97165 OT EVAL LOW COMPLEX 30 MIN: CPT | Mod: GO

## 2024-06-07 PROCEDURE — 1140000001 HC PRIVATE PSYCH ROOM DAILY

## 2024-06-07 PROCEDURE — 97535 SELF CARE MNGMENT TRAINING: CPT | Mod: GO

## 2024-06-07 PROCEDURE — 2500000001 HC RX 250 WO HCPCS SELF ADMINISTERED DRUGS (ALT 637 FOR MEDICARE OP): Performed by: INTERNAL MEDICINE

## 2024-06-07 PROCEDURE — 2500000002 HC RX 250 W HCPCS SELF ADMINISTERED DRUGS (ALT 637 FOR MEDICARE OP, ALT 636 FOR OP/ED): Mod: MUE | Performed by: INTERNAL MEDICINE

## 2024-06-07 RX ADMIN — LISINOPRIL 10 MG: 10 TABLET ORAL at 08:02

## 2024-06-07 RX ADMIN — BUDESONIDE, GLYCOPYRROLATE, AND FORMOTEROL FUMARATE 2 PUFF: 160; 9; 4.8 AEROSOL, METERED RESPIRATORY (INHALATION) at 20:35

## 2024-06-07 RX ADMIN — POTASSIUM CHLORIDE 20 MEQ: 1500 TABLET, EXTENDED RELEASE ORAL at 08:02

## 2024-06-07 RX ADMIN — TRAMADOL HYDROCHLORIDE 50 MG: 50 TABLET ORAL at 20:48

## 2024-06-07 RX ADMIN — ASPIRIN 81 MG: 81 TABLET, COATED ORAL at 08:02

## 2024-06-07 RX ADMIN — METOPROLOL TARTRATE 25 MG: 25 TABLET, FILM COATED ORAL at 08:02

## 2024-06-07 SDOH — HEALTH STABILITY: MENTAL HEALTH

## 2024-06-07 SDOH — ECONOMIC STABILITY: GENERAL

## 2024-06-07 SDOH — HEALTH STABILITY: MENTAL HEALTH: MAJOR CHANGE/ LOSS/ STRESSOR: CHRONIC CONDITION;JOB CHANGE/ LOSS;LOSS OF INDEPENDENCE

## 2024-06-07 SDOH — SOCIAL STABILITY: SOCIAL INSECURITY: ASSESSMENT OF VIOLENCE: NONE NOTED

## 2024-06-07 SDOH — SOCIAL STABILITY: SOCIAL INSECURITY: FEELS SAFE LIVING IN HOME: YES

## 2024-06-07 SDOH — ECONOMIC STABILITY: HOUSING INSECURITY: POTENTIALLY UNSAFE HOUSING CONDITIONS: UNABLE TO ASSESS

## 2024-06-07 SDOH — SOCIAL STABILITY: SOCIAL INSECURITY: THOUGHTS OF HARM TO OTHERS: NO

## 2024-06-07 SDOH — HEALTH STABILITY: MENTAL HEALTH: DEPRESSION SYMPTOMS: FEELINGS OF HELPLESSNESS;FEELINGS OF HOPELESSESS;FEELINGS OF WORTHLESSNESS;LOSS OF INTEREST

## 2024-06-07 SDOH — HEALTH STABILITY: MENTAL HEALTH: PREVIOUS MENTAL HEALTH TREATMENT: MEDICATION

## 2024-06-07 SDOH — HEALTH STABILITY: MENTAL HEALTH: MENTAL HEALTH CONDITIONS/ SYMPTOMS: DEPRESSION

## 2024-06-07 SDOH — HEALTH STABILITY: MENTAL HEALTH: ANXIETY SYMPTOMS: NO PROBLEMS REPORTED OR OBSERVED.

## 2024-06-07 SDOH — ECONOMIC STABILITY: HOUSING INSECURITY: HOME SAFETY: NO CONCERNS REPORTED

## 2024-06-07 SDOH — ECONOMIC STABILITY: GENERAL: INCOME/EXPENSE INFORMATION: INCOME MEETS EXPENSES

## 2024-06-07 SDOH — ECONOMIC STABILITY: HOUSING INSECURITY: FEELS SAFE LIVING IN HOME: YES

## 2024-06-07 ASSESSMENT — COLUMBIA-SUICIDE SEVERITY RATING SCALE - C-SSRS
2. HAVE YOU ACTUALLY HAD ANY THOUGHTS OF KILLING YOURSELF?: NO
6. HAVE YOU EVER DONE ANYTHING, STARTED TO DO ANYTHING, OR PREPARED TO DO ANYTHING TO END YOUR LIFE?: NO
1. SINCE LAST CONTACT, HAVE YOU WISHED YOU WERE DEAD OR WISHED YOU COULD GO TO SLEEP AND NOT WAKE UP?: NO
5. HAVE YOU STARTED TO WORK OUT OR WORKED OUT THE DETAILS OF HOW TO KILL YOURSELF? DO YOU INTEND TO CARRY OUT THIS PLAN?: NO
2. HAVE YOU ACTUALLY HAD ANY THOUGHTS OF KILLING YOURSELF?: NO
6. HAVE YOU EVER DONE ANYTHING, STARTED TO DO ANYTHING, OR PREPARED TO DO ANYTHING TO END YOUR LIFE?: NO
1. SINCE LAST CONTACT, HAVE YOU WISHED YOU WERE DEAD OR WISHED YOU COULD GO TO SLEEP AND NOT WAKE UP?: YES

## 2024-06-07 ASSESSMENT — PAIN SCALES - GENERAL
PAINLEVEL_OUTOF10: 6
PAINLEVEL_OUTOF10: 6
PAINLEVEL_OUTOF10: 0 - NO PAIN

## 2024-06-07 ASSESSMENT — PAIN - FUNCTIONAL ASSESSMENT
PAIN_FUNCTIONAL_ASSESSMENT: FLACC (FACE, LEGS, ACTIVITY, CRY, CONSOLABILITY)
PAIN_FUNCTIONAL_ASSESSMENT: 0-10
PAIN_FUNCTIONAL_ASSESSMENT: 0-10

## 2024-06-07 ASSESSMENT — PAIN DESCRIPTION - DESCRIPTORS: DESCRIPTORS: OTHER (COMMENT)

## 2024-06-07 ASSESSMENT — COGNITIVE AND FUNCTIONAL STATUS - GENERAL
DAILY ACTIVITIY SCORE: 17
HELP NEEDED FOR BATHING: A LITTLE
EATING MEALS: A LITTLE
PERSONAL GROOMING: A LITTLE
TOILETING: A LITTLE
DRESSING REGULAR LOWER BODY CLOTHING: A LOT
DRESSING REGULAR UPPER BODY CLOTHING: A LITTLE

## 2024-06-07 ASSESSMENT — LIFESTYLE VARIABLES
PRESCIPTION_ABUSE_PAST_12_MONTHS: NO
SUBSTANCE_ABUSE_PAST_12_MONTHS: NO

## 2024-06-07 ASSESSMENT — ACTIVITIES OF DAILY LIVING (ADL)
HOME_MANAGEMENT_TIME_ENTRY: 13
ADL_ASSISTANCE: INDEPENDENT

## 2024-06-07 NOTE — GROUP NOTE
Group Topic: Goals   Group Date: 6/7/2024  Start Time: 0730  End Time: 0800  Facilitators: Tamiko Hartmann   Department: [facility]    Number of Participants: 9   Group Focus: coping skills, daily focus, and goals  Treatment Modality: Other: pca  Interventions utilized were assignment  Purpose: coping skills, communication skills, and other: goals    Name: Nas Jaffe YOB: 1945   MR: [id number]      Facilitator: [facility]  Level of Participation: minimal  Quality of Participation: appropriate/pleasant  Interactions with others: appropriate and sarcastic  Mood/Affect: appropriate  Triggers (if applicable): n/a  Cognition: coherent/clear  Progress: Minimal  Comments: mood disorder  Plan: continue with services

## 2024-06-07 NOTE — PROGRESS NOTES
Psychosocial Assessment completed. Pt is a 79y  male admitted for SI. Pt had been making suicidal statements while at SNF following gallbladder surgery. Pt reportedly had plans to kill himself with guns or sit in truck with it running upon his return home. As well as pt presented helpless, hopeless, and was not wanting to be a burden to family. Pt has history of depression, self reported of 40yrs. Pt's wife passed away a few years ago. [facility] but [facility]. Pt's dtr states that he never really gave them a chance and that he has self medicated with alcohol. Pt states that he drinks 3-4 beers 3-4 times per week. However family reports it to be more like daily. Pt reportedly had alcohol in his gallbladder at the time of surgery, which is how significant his drinking was. Pt has chronic pain in his right hip that needs to be replaced, however due to his other physical conditions such as COPD and heart issues he is not able to undergo the surgery. Pt's dtr states that the guns have been removed from the home and he does not have access to a vehicle. Pt's dtr does not believe pt would harm himself but does endorse that pt is depressed. Pt's dtr feels that pt's son is contributing to pt's depression in that after pt talks to him he is down as well as he enables pt to drink with him. Pt is A&Ox3. Pt presents calm and cooperative with flat affect. LSW to follow for structure, support, and discharge planning.     Michelle Rodríguez, DARIANA

## 2024-06-07 NOTE — GROUP NOTE
Group Topic: Music Therapy   Group Date: 6/7/2024  Start Time: 1000  End Time: 1100  Facilitators: Anca Espinoza   Department: [facility]    Number of Participants: 8   Group Focus: calming/relaxation, coping skills/planning, education, expressive outlet, leisure skills, music therapy, opportunity for choice/control, and reality orientation  Treatment Modality: Leisure Development and Music Therapy  Interventions Utilized were: education/instruction, exploration, leisure development, passive music engagement, and sharing/discussion      Name: Nas Jaffe YOB: 1945   MR: [id number]      Level of Participation: withdrawn  Quality of Participation: distractible, isolative, passive, and quiet  Interactions with others: appropriate  Mood/Affect: blunted  Cognition, Pre Treatment: coherent/clear and not focused  Cognition, Post Treatment: coherent/clear and not focused  Progress: Minimal  Plan: continue with services

## 2024-06-07 NOTE — CONSULTS
"Nutrition Assessement Note    Nutrition Assessment    Reason for Assessment: Admission nursing screening (MST 3)  Recent discharge from [facility] for cholecystectomy. Admitted from rehab, pink slipped due to suicidal statements. Consulted for MST 3. Inconsistent weights in Epic, unable to assess weight loss. PO intake recorded as adequate. Ensure High Protein already provided once daily. Will continue to follow and monitor po intake.    Reason for Hospital Admission:  Nas Jaffe is a 79 y.o. male who is admitted for severe depression    Past Medical History:   Diagnosis Date    Acute cholecystitis 05/15/2024    Anxiety     CHF (congestive heart failure) (Multi)     GERD (gastroesophageal reflux disease)     Hypertension       Past Surgical History:   Procedure Laterality Date    CHOLECYSTECTOMY N/A 05/17/2024    Dr. Multani       Nutrition History:     Energy Intake: Good > 75 %  Food Allergies/Intolerances:  None  GI Symptoms: None  Oral Problems: None    Anthropometrics:  Ht: 172.7 cm (5' 8\"), Wt: 103 kg (227 lb 4.7 oz), BMI: 34.57  IBW/kg (Dietitian Calculated): 70 kg  Percent of IBW: 147 %  Adjusted Body Weight (kg): 78.18 kg    Weight Change:  Daily Weight  06/06/24 : 103 kg (227 lb 4.7 oz)  06/06/24 : 108 kg (237 lb)  05/31/24 : 107 kg (235 lb)  05/17/24 : 109 kg (240 lb 4.8 oz)  05/12/24 : 99.7 kg (219 lb 12.8 oz)     Weight History / % Weight Change: Inconsistent weights in Epic     Nutrition Focused Physical Exam Findings:   Subcutaneous Fat Loss  Orbital Fat Pads: Defer  Buccal Fat Pads: Defer  Triceps: Defer  Ribs: Defer    Muscle Wasting  Temporalis: Defer  Pectoralis (Clavicular Region): Defer  Deltoid/Trapezius: Defer  Interosseous: Defer  Trapezius/Infraspinatus/Supraspinatus (Scapular Region): Defer  Quadriceps: Defer  Gastrocnemius: Defer    Nutrition Significant Labs:  Lab Results   Component Value Date    WBC 4.5 06/06/2024    HGB 11.8 (L) 06/06/2024    HCT 36.7 (L) 06/06/2024     " 06/06/2024    CHOL 80 (L) 06/06/2024    TRIG 112 06/06/2024    HDL 31.0 (L) 06/06/2024    ALT 24 06/06/2024    AST 23 06/06/2024     06/06/2024    K 4.3 06/06/2024     06/06/2024    CREATININE 0.70 06/06/2024    BUN 13 06/06/2024    CO2 30 06/06/2024    HGBA1C 5.8 (H) 06/06/2024     Nutrition Specific Medications:  aspirin, 81 mg, oral, Daily  budesonide-glycopyr-formoterol, 2 puff, inhalation, BID  lisinopril, 10 mg, oral, Daily  magnesium glycinate, 100 mg, oral, Daily  metoprolol tartrate, 25 mg, oral, Daily  potassium chloride CR, 20 mEq, oral, Daily         Dietary Orders (From admission, onward)       Start     Ordered    06/07/24 1322  Oral nutritional supplements  Until discontinued        Question Answer Comment   Deliver with Breakfast    Select supplement: Ensure High Protein        06/07/24 1322    06/06/24 2151  Adult diet Regular  Diet effective now        Question:  Diet type  Answer:  Regular    06/06/24 2155                   Estimated Needs:   Estimated Energy Needs  Total Energy Estimated Needs (kCal):  (3453-1181)  Total Estimated Energy Need per Day (kCal/kg):  (25-30)  Method for Estimating Needs: Adj Wt    Estimated Protein Needs  Total Protein Estimated Needs (g):  (78-94)  Total Protein Estimated Needs (g/kg):  (1-1.2)  Method for Estimating Needs: Adj Wt    Estimated Fluid Needs  Total Fluid Estimated Needs (mL): 1950 mL  Total Fluid Estimated Needs (mL/kg): 25 mL/kg  Method for Estimating Needs: Adj Wt      Nutrition Diagnosis   Nutrition Diagnosis:     Nutrition Diagnosis  Patient has Nutrition Diagnosis: No     Nutrition Interventions/Recommendations   Nutrition Interventions and Recommendations:    Nutrition Prescription:  Individualized Nutrition Prescription Provided for : 3978-9468 calories, 78-94 gm protein to be provided via diet/nutritional supplements    Nutrition Interventions:   Food and/or Nutrient Delivery Interventions  Interventions: Meals and snacks, Medical  food supplement  Meals and Snacks: General healthful diet  Goal: provide as ordered  Medical Food Supplement: Commercial beverage  Goal: ensure high protein once daily to provide 160 kcals and 16g protein    Education Documentation  No documentation found.         Nutrition Monitoring and Evaluation   Monitoring/Evaluation:   Food/Nutrient Related History Monitoring  Monitoring and Evaluation Plan: Energy intake  Energy Intake: Estimated energy intake  Criteria: pt to consume >/= 75% estimated needs       Time Spent/Follow-up:   Follow Up  Time Spent (min): 25 minutes  Last Date of Nutrition Visit: 06/07/24  Nutrition Follow-Up Needed?: 5-7 days  Follow up Comment: 6/12/24

## 2024-06-07 NOTE — NURSING NOTE
Nurse Admission Note    Reason for admission in patient's own words:  I guess I was suicidal after being sick for a while    Patient living arrangements prior to admission:  Rehab after gallbladder removal surgery, home with daughter prior to that    Legal status:  voluntarily    Medical consult notification to:  Dr. Espinoza    Admission folder given to:   patient    Problems/treatment plans:  Depression, SI    Patient requests family to be notified of admission?    yes  Person notified:  Daughter Aishwarya Jaffe    Strengths:  has interpersonal relationships and support, housing stability, and financial stability    Liabilities:  poor physical health, self-care deficit    Previous mental health treatment:  [facility]    Preliminary discharge planning needs:  TBA

## 2024-06-07 NOTE — NURSING NOTE
"VAL NOTE     Problem:  Pt. Is continuing their journey on working to improve their depressive Sx.     Behavior:  Pt. Is cooperative w/ Tx. Plan and has been agreeable to talking w/ staff & peers alike. Pt. Has been able to maintain safety. Pt. States he's not really supposed to be here & that people blew his statements out of proprtion. Medical team was notified with \"No rush, pt. has complained of dizziness, bp is fine, (he's on bp meds) but more importantly he states he takes Dremamine at home when he feels like this. thoughts? \"  Response was \"Can monitor for now Make him drink more fluids .\"  Will continue to follow advise.  Appetite/Meals: good        Interventions:  Pt. Was offered groups and their scheduled medications.     Response:  Pt. Has been compliant w/ meds, tolerated them well; and did attend the majority of group sessions w/ good results.     Plan:  Pt. will continue to be monitored for changes in mental status & safety on the unit.    "

## 2024-06-07 NOTE — GROUP NOTE
"Group Topic: Other   Group Date: 6/7/2024  Start Time: 1100  End Time: 1145  Facilitators: RAMÍREZ Mckeon   Department: [facility]    Number of Participants: 6   Group Focus: other Leisure Does it Cards  Treatment Modality: Other: Recreation Therapy  Interventions utilized were exploration, group exercise, patient education, problem solving, and support  Purpose: coping skills, maladaptive thinking, feelings, irrational fears, communication skills, insight or knowledge, self-worth, self-care, relapse prevention strategies, and trigger / craving management    Name: Nas Jaffe YOB: 1945   MR: [id number]      Facilitator: Recreational Therapist  Level of Participation: did not attend  Quality of Participation:  did not attend  Interactions with others:  did not attend  Mood/Affect:  did not attend  Triggers (if applicable): n/a  Cognition:  did not attend  Progress: None  Comments: pt problem is depressed mood.  Pt refuses to join group at this time.  Reports he is \"more of a loner and I am not interested in groups\".  No handout to offer.   Plan: continue with services      "

## 2024-06-07 NOTE — H&P
"History Of Present Illness  Nas Jaffe is a 79 y.o. male presenting with reported exacerbated depression and suicidal ideation.    Chart reviewed including Care Everywhere records, and notes incorporated by reference or direct quotation.    Patient arrived to the [facility] emergency department on June 6 around 120 in the afternoon with report of suicidal ideation the patient was sent by outpatient psychiatric provider due to suicidal statements at the skilled nursing facility.  It is reported in the emergency department documentation the patient has been having thoughts of dying by suicide and that he has several different methods and contemplation to do so.    Patient was seen in the emergency department for medical clearance and deemed to be not in need of other medical services interfering with potential psychiatric admission and referral was made for social work assessment by emergency psychiatric Access team.  Notable includes mild anemia with mild electrolyte imbalance.    Emergency psychiatric Access team then assessed patient who on triage was noted to be high risk as having made suicidal statements to family and providers over the last several weeks with reports of feeling hopeless due to medical issues including pain from recent gallbladder surgery.  It is documented the patient has shared plans to shoot himself or sit in his truck with a running.  It is further noted that patient started psychiatric medications via nurse practitioner from [facility] who rounds at the skilled nursing facility and patient has continued to have mood symptoms including suicidality.  In the emergency department patient is quoted as saying \"I retract what I said because I just want to go home.\"    Further charting describes patient as having been a resident of [facility] following recent surgery with anticipated discharge from that facility this weekend and that patient was making suicidal statements to family " "and providers over the last several weeks describing that he has had a hard life and feels hopeless.  However, patient denying suicidality directly when asked by  during initial assessment reporting that he instead wants to go home.  He previously endorsed that he had multiple plans to complete suicide.  Patient described feeling that he is a burden to his family and has been talking about planning for his afterlife including who would obtain his belongings.  Patient was started on Pristiq with mild improvement while in rehab but continued to voice suicidal thoughts.  Patient did endorse history of suicidality throughout his lifetime during the assessment in the emergency department and further explains this as \"everybody thinks about killing themselves.\"  Patient is noted to be [place] cooperative but with irritable mood.  It was determined the patient would benefit from inpatient psychiatric stabilization and was referred to this facility and thus transferred.    On meeting with patient this morning, he is seated in a wheelchair after having completed the lunch service.  He engages willingly and readily for encounter with this provider stating surprise that he did not think this provider would be meeting with him today.  Patient tells the story that he was recently admitted medically related to cholecystitis with subsequent surgery for this reason and then was discharged medically to rehab to improve his ability to walk.  Patient notes that he was \"kidnapped\" after making comments about having suicidal thoughts which he reports was true but is no longer.  He states several times over that \"I would just like to go home.\"  Patient endorses feeling depressed and describes this as being \"down in the dumps.\"  Patient notes he has been this way for 40 years with limited improvement.  Patient reports that he has previously tried several [facility] but has " not formally been treated psychiatrically in the past.  He reports medication is unhelpful and therefore declines additional psychiatric medications and reports would not like to take any psychiatric medications during this admission.  He endorses several features of depression including trouble with sleep, low interest, increased guilt, feelings of hopelessness and helplessness, low moods, psychomotor slowing, trouble with appetite and suicidality.  Despite this, he continues to report that he does not feel he is a risk of harm to himself and that he would like to be able to leave the facility also related to cost of admission.  He is allowing for collateral obtaining from his daughter Aishwarya with whom he lives.  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  Reports desire to punch referring provider in the face.  Denies expressed desire to otherwise harm others.  Denies psychotic features including hallucinosis.  Is not evidencing aneesh.       Past Medical History  He has a past medical history of Acute cholecystitis (05/15/2024), Anxiety, CHF (congestive heart failure) (Multi), GERD (gastroesophageal reflux disease), and Hypertension.    Past Psychiatric History:   Reports has never been previously psychiatrically hospitalized.  Patient denies previous suicide attempts.  Patient endorses multiple [facility] in the [facility] but has not been linked with a psychiatrist.  Patient denies family history of psychiatric issues.    Surgical History  He has a past surgical history that includes Cholecystectomy (N/A, 05/17/2024).     Social History  He reports that he has quit smoking. His smoking use included cigarettes. He has quit using smokeless tobacco.  His smokeless tobacco use included chew. He reports current alcohol use of about 14.0 standard drinks of  "alcohol per week. He reports that he does not use drugs.  Patient reports he was born and raised in [place] and moved in [place] in .  He completed high school and no college.  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  He reports he lives with his daughter Aishwarya and has 2 other children including an older daughter and a son.  He reports he was  but his wife is now .  He reports having multiple firearms at home.  Patient reports she was in the [employer] at [place] and was never deployed and did not see active duty.  Patient denies any current or past legal charges against him.  Patient reports that he has had an alcohol use history including going to the [employer] 3 or 4 times per week and drinking 3 or 4 beers each time.  He also reports that he uses snuff but has not been able to do so since being in the hospital.  Patient denies use of other substances including marijuana or cocaine and illicit street drugs.     Allergies  Tetanus toxoid fluid, Duloxetine, Mirtazapine, Flunisolide, and Naproxen    Review of Systems    Psychiatric ROS - Adult  Anxiety: Negative  Depression: anhedonia, appetite decreased, concentration, energy, guilt, helpless, hopeless, interest, persistent thoughts of death, psychomotor retardation, sleep decreased , suicidal thoughts, and withdrawn  Delirium: negative  Psychosis: negative  Lore: negative  Safety Issues: thoughts of harm to others, suicidal ideation, and weapon (gun) in home  Psychiatric ROS Comment: as noted    Physical Exam      Mental Status Examination  General Appearance: Age appearing male wearing personal garments sitting in a wheelchair facing the window in his room after lunch service  Gait/Station: Not observed ambulating and seen using wheelchair to move about the milieu  Speech: Slow but clear, limited spontaneity  Mood: \"I am down in the dumps\"  Affect: Dysphoric, " "constricted  and Congruent with mood and topic of conversation  Thought Process:  Logical but simple, superficial  Thought Associations: No loosening of associations  Thought Content: suicidal  Perception: No perceptual abnormalities noted  Level of Consciousness: Alert  Orientation: Alert and oriented to person, place, time and situation  Attention and Concentration: Intact  Recent Memory: Intact as evidenced by ability to recall details from the past 24 hours   Remote Memory: Intact as evidenced by ability to recall previous medical issues   Executive function: Intact  Language: Naming intact  Fund of knowledge: Good  Insight: Limited, as evidenced by recognition of recent suicidality and active depression however refusal to engage to accept treatment for this condition, continuous requests for discharge  Judgment: Limited, as evidence by inability to reason through medical decision making, recent high-risk or self-harming behavior , and desire to cause harm to self      Psychiatric Risk Assessment  Violence Risk Assessment: major mental illness, male,  history or weapons training, presence of firearms in home, and other , multiple medical conditions, chronic pain  Acute Risk of Harm to Others is Considered: moderate   Suicide Risk Assessment: access to weapons, age > 65 yrs old, , chronic medical illness, chronic pain, current psychiatric illness, feelings of hopelessness, life crisis (shame/despair), male, presence of firearms in home, suicidal ideations, suicidal plans, and unmarried  Protective Factors against Suicide: other limited  Acute Risk of Harm to Self is Considered: moderate    Last Recorded Vitals  Blood pressure 135/68, pulse 86, temperature 36.6 °C (97.9 °F), temperature source Temporal, resp. rate 18, height 1.727 m (5' 8\"), weight 103 kg (227 lb 4.7 oz), SpO2 94%.    Relevant Results        Results for orders placed or performed during the hospital encounter of 06/06/24 " (from the past 96 hour(s))   Lipid Panel   Result Value Ref Range    Cholesterol 80 (L) 133 - 200 mg/dL    HDL-Cholesterol 31.0 (L) >40.0 mg/dL    Cholesterol/HDL Ratio 2.6 SEE COMMENT    LDL Calculated 27 (L) 65 - 130 mg/dL    Triglycerides 112 40 - 150 mg/dL   Hemoglobin A1C   Result Value Ref Range    Hemoglobin A1C 5.8 (H) See below %    Estimated Average Glucose 120 Not Established mg/dL          Assessment/Plan   Principal Problem:    Severe recurrent major depression without psychotic features (Multi)  Active Problems:    Mood disorder ([facility])      79-year-old male admitted for suicidality with exacerbated depression.  Patient had been from the community recently through medical hospitalization for cholecystitis with resulting cholecystectomy.  Patient was then medically discharged to skilled nursing facility for physical rehabilitation.  It had been noted the patient had been making comments related to suicidality with multiple plans and concern for intention at discharge from skilled nursing facility.  Psychiatry services at skilled nursing facility recommended admission and patient transitioned to psychiatric facility for further stabilization.  In meeting with patient today, he endorses past suicidality and ongoing depression symptoms however declines to engage in treatment including use of medications.  He is discharge focused and appears to be dissimulating for purposes affording sooner discharge.  There is concern his level of insight and failure to appreciate the significance and concerns related to the current scenario.  He is allowing for collateral obtaining from his daughter with whom he lives.  Patient has several risk factors for suicide including age, sex, race, chronic medical conditions, past  history, weapons access, limited insight, patient is .  Referring provider updated on progress thus far and notes that patient elected to terminate services with her on discharge from  SNF.    Biological -     Patient is declining to provide consent for psychiatric medications at this time.  We will revisit this with him each day and encourage medication to help treat depression.     Discussion with patient regarding risk benefits and alternatives and side effects with opportunity to ask questions and questions answered.  Patient given consent to the plan as noted    2. Psychological -       Patient is encouraged to participate with the therapeutic milieu and program and group therapy      3. Sociological -      Patient encouraged to cooperate with social work staff on issues relevant to discharge planning         Goals for discharge include:  Psychiatric condition: to lower acute risk, return to baseline level of functioning, work to identify positive coping skills to manage psychiatric symptoms, allow for reconciliation of medications  Physical condition: increase daily physical activity, demonstrate interest in completing daily activity, and complete Activities of Daily Living  Social function: identify protective factors and family supports, increase social interactions on the unit with peers and providing staff, and demonstrate appropriate problem solving skills for engaging after care on discharge    I personally spent 50 minutes today providing care for this patient, including preparation, face to face time, documentation and other services such as review of medical records, diagnostic result, patient education, counseling, coordination of care as specified in the encounter.    Parts of this chart have been completed using voice recognition software.  Please excuse any errors of transcription.  Despite the medical decision making time stamp, my medical decision making has taken place during the patient's entire visit.  Thought process and reason for plan has been formulated from the time that I saw the patient until the time of disposition and is not specific to one specific moment during  their visit and furthermore the medical decision making encompasses the entire chart and not only that represented in this note.      Medication Consent  Medication Consent: declining to provide consent for psychiatric medications at this time    Varinder Gutierrez, DO

## 2024-06-07 NOTE — PROGRESS NOTES
Occupational Therapy Evaluation & Treatment    Evaluation/Treatment    Patient Name: Nas Jaffe  MRN: [id number]  : 1945  Today's Date: 24  Time Calculation  Start Time: 704  Stop Time: 727  Time Calculation (min): 23 min       Assessment:  OT Assessment: 78 y/o male presenting from SNF with c/c depression and SI. on eval, he requires increased assist for xfers, mobility and self care d/t decreased strength, balance, activity tolerance and frustration tolerance.  Would benefit from skilled OT services while in house to maximize safety/IND prior to DC.  Prognosis: Excellent  Barriers to Discharge: Other (Comment) (requires assist x1 for xfers/self care; lives with dtr - works?)  Evaluation/Treatment Tolerance: Patient tolerated treatment well  Medical Staff Made Aware: Yes  End of Session Communication: Bedside nurse, PCT/NA/CTA  End of Session Patient Position: Up in chair, Alarm off, caregiver present (seated at nurses' station; within eyesight of staff)  OT Assessment Results: Decreased ADL status, Decreased upper extremity strength, Decreased cognition, Decreased endurance, Decreased functional mobility, Decreased IADLs  Prognosis: Excellent  Barriers to Discharge: Other (Comment) (requires assist x1 for xfers/self care; lives with dtr - works?)  Evaluation/Treatment Tolerance: Patient tolerated treatment well  Medical Staff Made Aware: Yes  Strengths: Support of Caregivers, Rehab experience  Barriers to Participation: Comorbidities  Plan:  Treatment Interventions: ADL retraining, Functional transfer training, UE strengthening/ROM, Endurance training, Cognitive reorientation, Patient/family training, Equipment evaluation/education, Neuromuscular reeducation, Compensatory technique education  OT Frequency: 3 times per week  OT Discharge Recommendations: Low intensity level of continued care  Equipment Recommended upon Discharge: Wheeled walker  OT Recommended Transfer Status: Assist of 1  OT - OK  to Discharge: Yes  Treatment Interventions: ADL retraining, Functional transfer training, UE strengthening/ROM, Endurance training, Cognitive reorientation, Patient/family training, Equipment evaluation/education, Neuromuscular reeducation, Compensatory technique education    Subjective   Current Problem:  No diagnosis found.  General:   OT Received On: 06/07/24  General  Reason for Referral: Decreased ADLs  Referred By: Dr. Gutierrez  Past Medical History Relevant to Rehab: CHF, depression, cholecystitis s/p surgery on 5/19  Family/Caregiver Present: No  Prior to Session Communication: PCT/NA/CTA  Patient Position Received: Bed, 3 rail up  Preferred Learning Style: visual, verbal  General Comment: Cleared by nsg, pt met in supine, agreeable to OT session  Precautions:  Hearing/Visual Limitations: + False Pass, does not wear hearing aides, reports his glasses are not here  Medical Precautions: Abdominal precautions, Fall precautions  Post-Surgical Precautions: Abdominal surgery precautions (s/p cholecystectomy in May)    Pain:  Pain Assessment  Pain Assessment: 0-10  Pain Score: 6  Pain Type: Acute pain, Surgical pain  Pain Location: Abdomen    Objective   Cognition:  Overall Cognitive Status: Within Functional Limits  Cognition Comments: decreased frustration tolerance at times but pt is calm and cooperative for the majority of session    Home Living:  Type of Home: House  Lives With: Adult children (dtr)  Home Adaptive Equipment: Cane, Walker rolling or standard (cannot recall if walker at home is std or front wheeled; prefers his cane)  Home Layout: Multi-level, Stairs to alternate level with rails  Alternate Level Stairs-Rails:  (unilateral)  Alternate Level Stairs-Number of Steps: full flight to 2nd story or basement for shower/bedroom  Bathroom Shower/Tub: Walk-in shower  Bathroom Equipment: Grab bars in shower, Shower chair with back  Home Living Comments: patient is from [facility] where he is rehabbing after  abdominal surgery; planned DC from rehab was scheduled from 6/8 - he typically resides in a multi level home c/ dtr, uses a cane at baseline    Prior Function:  Level of [place]: Independent with ADLs and functional transfers, Independent with homemaking with ambulation  Receives Help From: Family, Other (Comment) (dtr)  ADL Assistance: Independent  Homemaking Assistance: Needs assistance (dtr assists)  Ambulatory Assistance: Independent (mod I with cane)  Prior Function Comments: denies fall hx    ADL:  Eating Assistance: Stand by  Eating Deficit: Setup  Grooming Assistance: Stand by  Grooming Deficit: Supervision/safety, Setup, Wash/dry hands, Teeth care, Wash/dry face (standing sinkside grooming tasks- supervision for safety/stability)  UE Dressing Assistance: Stand by  UE Dressing Deficit: Setup (to kartik pull over shirt)  Toileting Assistance with Device: Stand by  Toileting Deficit: Supervison/safety, Use of bedpan/urinal setup (requests urinal for toileting at EOB; supervision for stability in dynamic standing)    Activity Tolerance:  Endurance: Decreased tolerance for upright activites  Activity Tolerance Comments: slightly SOB after household distance mobility    Bed Mobility/Transfers:   Bed Mobility  Bed Mobility: Yes  Bed Mobility 1  Bed Mobility 1: Supine to sitting  Level of Assistance 1: Minimum assistance  Bed Mobility Comments 1: min A for trunk up, high effort required with HOB significantly elevated    Transfers  Transfer: Yes  Transfer 1  Technique 1: Stand to sit, Sit to stand  Transfer Device 1: Walker  Transfer Level of Assistance 1: Contact guard  Trials/Comments 1: STS x 3 trials today, cues for hand placement and walker mgmt required    Functional Mobility:  Functional Mobility  Functional Mobility Performed: Yes  Functional Mobility 1  Surface 1: Level tile  Device 1: Rolling walker  Assistance 1: Close supervision  Comments 1: ambulates household distance x 2 trials today with FWW,  "supervision for stability. chronic gait issues d/t impaired Lt hip - reports \"it is loose\" but no overt LOB noted    Vision:Vision - Basic Assessment  Current Vision: Does not wear glasses  Sensation:  Light Touch: Partial deficits in the RUE, Partial deficits in the LUE, Partial deficits in the RLE, Partial deficits in the LLE (reports intermittent numbness/tingling in all extremities but currently sensation is WFL)  Strength:  Strength Comments: BUEs 4-/5 grossly, chronic shoulder stiffness b/l  Hand Function:  Hand Function  Gross Grasp: Functional  Coordination: Functional  Extremities: RUE   RUE : Within Functional Limits and LUE   LUE: Within Functional Limits    Outcome Measures: [facility] Daily Activity  Putting on and taking off regular lower body clothing: A lot  Bathing (including washing, rinsing, drying): A little  Putting on and taking off regular upper body clothing: A little  Toileting, which includes using toilet, bedpan or urinal: A little  Taking care of personal grooming such as brushing teeth: A little  Eating Meals: A little  Daily Activity - Total Score: 17      Education Documentation  Body Mechanics, taught by Pantera Zapata OT at 6/7/2024  7:51 AM.  Learner: Patient  Readiness: Acceptance  Method: Explanation  Response: Needs Reinforcement    Precautions, taught by Pantera aZpata OT at 6/7/2024  7:51 AM.  Learner: Patient  Readiness: Acceptance  Method: Explanation  Response: Needs Reinforcement    ADL Training, taught by Pantera Zapata OT at 6/7/2024  7:51 AM.  Learner: Patient  Readiness: Acceptance  Method: Explanation  Response: Needs Reinforcement    Education Comments  No comments found.      OP EDUCATION:  Education  Individual(s) Educated: Patient  Education Provided: Fall precautons, Risk and benefits of OT discussed with patient or other, POC discussed and agreed upon  Risk and Benefits Discussed with Patient/Caregiver/Other: yes  Patient/Caregiver Demonstrated Understanding: yes  Plan " of Care Discussed and Agreed Upon: yes  Patient Response to Education: Patient/Caregiver Verbalized Understanding of Information    Goals:  Encounter Problems       Encounter Problems (Active)       OT Goals       ADLs (Progressing)       Start:  06/07/24    Expected End:  06/21/24       Patient will complete ADLs with MOD I using AE/compensatory techniques as needed.          Functional Transfers (Progressing)       Start:  06/07/24    Expected End:  06/21/24       Patient will complete functional transfers, including but not limited to: bed, chair, toilet, shower with MOD I using LRD.          Precautions (Progressing)       Start:  06/07/24    Expected End:  06/21/24       Patient will independently verbalize post op abdominal precautions in preparation for ADLS.

## 2024-06-07 NOTE — GROUP NOTE
"Group Topic: Other   Group Date: 6/7/2024  Start Time: 1330  End Time: 1430  Facilitators: RAMÍREZ Mckeon   Department: [facility] REHABTH VIRTUAL    Number of Participants: 7   Group Focus: other Tell All  Treatment Modality: Other: Recreation Therapy  Interventions utilized were mental fitness, reminiscence, and story telling  Purpose: other: elevate mood, increase socialization, enhance self esteem    Name: Nas Jaffe YOB: 1945   MR: [id number]      Facilitator: Recreational Therapist  Level of Participation: did not attend  Quality of Participation:  did not attend  Interactions with others:  did not attend  Mood/Affect:  did not attend  Triggers (if applicable): n/a  Cognition:  did not attend  Progress: None  Comments: pt problem is depressed mood.  Pt continues to refuse to attend group at this time.  Reports groups \" make me uncomfortable, I am a loner\".   No handout to offer.   Plan: continue with services      "

## 2024-06-07 NOTE — CONSULTS
[facility]: [facility]  PROGRESS NOTE      Nas Jaffe is a 79 y.o. male that is being seen  today for medical management .  Subjective   Patient is a 79-year-old male who recently underwent cholecystectomy for acute cholecystitis, hypertension, depression and anxiety who is being seen for medical management of geropsych.  Patient was recently admitted to the hospital for acute cholecystitis and had a surgery done after that patient was admitted to rehab and patient was having suicidal ideations.  Patient was brought to the ER and evaluated and medically cleared for admission to [facility].  Patient denies any suicidal ideations at present.      ROS  Negative for fever or chills  Negative for sore throat, ear pain, nasal discharge  Negative for cough, shortness of breath or wheezing  Negative for chest pain, palpitations, swelling of legs  Negative for abdominal pain, constipation, diarrhea, blood in the stools  Negative for urinary complaints  Negative for headache, dizziness, weakness or numbness  Negative for joint pain  Negative for depression or anxiety  All other systems reviewed and were negative   Vitals:    06/07/24 0602   BP: 135/68   Pulse: 86   Resp: 18   Temp: 36.6 °C (97.9 °F)   SpO2: 94%      Vitals:    06/06/24 2203   Weight: 103 kg (227 lb 4.7 oz)     Body mass index is 34.56 kg/m².  Physical Exam  Constitutional: Patient does not appear to be in any acute distress  Head and Face: NCAT  Eyes: Normal external exam, EOMI  ENT: Normal external inspection of ears and nose. Oropharynx normal.  Cardiovascular: RRR, S1/S2, no murmurs, rubs, or gallops, radial pulses +2, no edema of extremities  Pulmonary: CTAB, no respiratory distress.  Abdomen: +BS, soft, non-tender, nondistended, no guarding or rebound, no masses noted  MSK: No joint swelling, normal movements of all extremities. Range of motion- normal.  Skin- No lesions, contusions, or erythema.  Peripheral puslses palpable  bilaterally 2+  Neuro: AAO X3, Cranial nerves 2-12 grossly intact,DTR 2+ in all 4 limbs   Psychiatric: Judgment intact. Appropriate mood and behavior    LABS   [unfilled]  Lab Results   Component Value Date    GLUCOSE 101 (H) 06/06/2024    CALCIUM 8.4 (L) 06/06/2024     06/06/2024    K 4.3 06/06/2024    CO2 30 06/06/2024     06/06/2024    BUN 13 06/06/2024    CREATININE 0.70 06/06/2024     Lab Results   Component Value Date    ALT 24 06/06/2024    AST 23 06/06/2024    ALKPHOS 89 06/06/2024    BILITOT 0.3 06/06/2024     Lab Results   Component Value Date    WBC 4.5 06/06/2024    HGB 11.8 (L) 06/06/2024    HCT 36.7 (L) 06/06/2024    MCV 96 06/06/2024     06/06/2024     Lab Results   Component Value Date    CHOL 80 (L) 06/06/2024     Lab Results   Component Value Date    HDL 31.0 (L) 06/06/2024     Lab Results   Component Value Date    LDLCALC 27 (L) 06/06/2024     Lab Results   Component Value Date    TRIG 112 06/06/2024     Lab Results   Component Value Date    HGBA1C 5.8 (H) 06/06/2024     Other labs not included in the list above were reviewed either before or during this encounter.    History    Past Medical History:   Diagnosis Date    Acute cholecystitis 05/15/2024    Anxiety     CHF (congestive heart failure) (Multi)     GERD (gastroesophageal reflux disease)     Hypertension      Past Surgical History:   Procedure Laterality Date    CHOLECYSTECTOMY N/A 05/17/2024    Dr. Multani     No family history on file.  Allergies   Allergen Reactions    Tetanus Toxoid Fluid Anaphylaxis    Duloxetine Nausea Only    Mirtazapine Other    Flunisolide Other    Naproxen GI Upset     No current facility-administered medications on file prior to encounter.     Current Outpatient Medications on File Prior to Encounter   Medication Sig Dispense Refill    aspirin 81 mg EC tablet Take 1 tablet (81 mg) by mouth once daily.      budesonide-glycopyr-formoterol (Breztri Aerosphere) 160-9-4.8 mcg/actuation HFA aerosol  inhaler Inhale 2 times a day.      desvenlafaxine succinate (Pristiq) 25 mg 24 hour tablet Take 2 tablets (50 mg) by mouth once daily.      lisinopril 10 mg tablet Take 1 tablet (10 mg) by mouth once daily.      magnesium glycinate 100 mg magnesium capsule Take by mouth.      metoprolol tartrate (Lopressor) 25 mg tablet Take 1 tablet (25 mg) by mouth once daily.      traMADol (Ultram) 50 mg tablet Take 1 tablet (50 mg) by mouth 2 times a day as needed for severe pain (7 - 10) or moderate pain (4 - 6).      acetaminophen 325 mg chewable tablet Take 1 tablet (325 mg) by mouth every 6 hours if needed.      albuterol 2.5 mg /3 mL (0.083 %) nebulizer solution Take 3 mL (2.5 mg) by nebulization every 6 hours if needed for wheezing or shortness of breath.      famotidine (Pepcid) 20 mg tablet Take 1 tablet (20 mg) by mouth once daily for 15 days. 15 tablet 0    magnesium hydroxide (MILK OF MAGNESIA ORAL) Take by mouth.      ondansetron (Zofran) 4 mg tablet Take 1 tablet (4 mg) by mouth every 6 hours if needed for nausea or vomiting.      potassium chloride CR 20 mEq ER tablet Take 1 tablet (20 mEq) by mouth once daily. Do not crush or chew.      sennosides-docusate sodium (Edna-Colace) 8.6-50 mg tablet Take 1 tablet by mouth once daily.       Immunization History   Administered Date(s) Administered    Moderna SARS-CoV-2 Vaccination 05/25/2021, 06/22/2021, 01/21/2022     Patient's medical history was reviewed and updated either before or during this encounter.  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  Denies any suicidal ideations at present blood pressure is fairly controlled with metoprolol.  Patient has been on tramadol for pain.  Patient does have low albumin and patient has been  started on protein supplements.      Shayne Espinoza MD

## 2024-06-08 PROCEDURE — 2500000002 HC RX 250 W HCPCS SELF ADMINISTERED DRUGS (ALT 637 FOR MEDICARE OP, ALT 636 FOR OP/ED): Performed by: INTERNAL MEDICINE

## 2024-06-08 PROCEDURE — 2500000001 HC RX 250 WO HCPCS SELF ADMINISTERED DRUGS (ALT 637 FOR MEDICARE OP): Performed by: INTERNAL MEDICINE

## 2024-06-08 PROCEDURE — 99232 SBSQ HOSP IP/OBS MODERATE 35: CPT | Performed by: PSYCHIATRY & NEUROLOGY

## 2024-06-08 PROCEDURE — 97110 THERAPEUTIC EXERCISES: CPT | Mod: GP | Performed by: PHYSICAL THERAPIST

## 2024-06-08 PROCEDURE — 97161 PT EVAL LOW COMPLEX 20 MIN: CPT | Mod: GP | Performed by: PHYSICAL THERAPIST

## 2024-06-08 PROCEDURE — 1140000001 HC PRIVATE PSYCH ROOM DAILY

## 2024-06-08 RX ORDER — DESVENLAFAXINE SUCCINATE 25 MG/1
25 TABLET, EXTENDED RELEASE ORAL DAILY
Status: DISCONTINUED | OUTPATIENT
Start: 2024-06-08 | End: 2024-06-10 | Stop reason: HOSPADM

## 2024-06-08 RX ORDER — MULTIVIT-MIN/FOLIC/VIT K/LYCOP 400-300MCG
64 TABLET ORAL DAILY
Status: DISCONTINUED | OUTPATIENT
Start: 2024-06-08 | End: 2024-06-10 | Stop reason: HOSPADM

## 2024-06-08 RX ADMIN — BUDESONIDE, GLYCOPYRROLATE, AND FORMOTEROL FUMARATE 2 PUFF: 160; 9; 4.8 AEROSOL, METERED RESPIRATORY (INHALATION) at 20:52

## 2024-06-08 RX ADMIN — METOPROLOL TARTRATE 25 MG: 25 TABLET, FILM COATED ORAL at 08:21

## 2024-06-08 RX ADMIN — TRAMADOL HYDROCHLORIDE 50 MG: 50 TABLET ORAL at 08:30

## 2024-06-08 RX ADMIN — POTASSIUM CHLORIDE 20 MEQ: 1500 TABLET, EXTENDED RELEASE ORAL at 08:21

## 2024-06-08 RX ADMIN — ASPIRIN 81 MG: 81 TABLET, COATED ORAL at 08:21

## 2024-06-08 RX ADMIN — TRAMADOL HYDROCHLORIDE 50 MG: 50 TABLET ORAL at 20:55

## 2024-06-08 RX ADMIN — LISINOPRIL 10 MG: 10 TABLET ORAL at 08:21

## 2024-06-08 RX ADMIN — BUDESONIDE, GLYCOPYRROLATE, AND FORMOTEROL FUMARATE 2 PUFF: 160; 9; 4.8 AEROSOL, METERED RESPIRATORY (INHALATION) at 08:20

## 2024-06-08 ASSESSMENT — PAIN DESCRIPTION - LOCATION
LOCATION: BACK
LOCATION: SHOULDER

## 2024-06-08 ASSESSMENT — PAIN SCALES - PAIN ASSESSMENT IN ADVANCED DEMENTIA (PAINAD)
BODYLANGUAGE: TENSE, DISTRESSED PACING, FIDGETING
TOTALSCORE: 3
FACIALEXPRESSION: SAD, FRIGHTENED, FROWN
CONSOLABILITY: NO NEED TO CONSOLE
TOTALSCORE: MEDICATION (SEE MAR)
NEGVOCALIZATION: OCCASIONAL MOAN/GROAN, LOW SPEECH, NEGATIVE/DISAPPROVING QUALITY
BREATHING: NORMAL

## 2024-06-08 ASSESSMENT — PAIN SCALES - GENERAL
PAINLEVEL_OUTOF10: 1
PAINLEVEL_OUTOF10: 0 - NO PAIN
PAINLEVEL_OUTOF10: 5 - MODERATE PAIN
PAINLEVEL_OUTOF10: 5 - MODERATE PAIN
PAINLEVEL_OUTOF10: 4
PAINLEVEL_OUTOF10: 0 - NO PAIN

## 2024-06-08 ASSESSMENT — COLUMBIA-SUICIDE SEVERITY RATING SCALE - C-SSRS
2. HAVE YOU ACTUALLY HAD ANY THOUGHTS OF KILLING YOURSELF?: NO
1. SINCE LAST CONTACT, HAVE YOU WISHED YOU WERE DEAD OR WISHED YOU COULD GO TO SLEEP AND NOT WAKE UP?: NO
2. HAVE YOU ACTUALLY HAD ANY THOUGHTS OF KILLING YOURSELF?: NO
6. HAVE YOU EVER DONE ANYTHING, STARTED TO DO ANYTHING, OR PREPARED TO DO ANYTHING TO END YOUR LIFE?: NO
6. HAVE YOU EVER DONE ANYTHING, STARTED TO DO ANYTHING, OR PREPARED TO DO ANYTHING TO END YOUR LIFE?: NO
1. SINCE LAST CONTACT, HAVE YOU WISHED YOU WERE DEAD OR WISHED YOU COULD GO TO SLEEP AND NOT WAKE UP?: NO
5. HAVE YOU STARTED TO WORK OUT OR WORKED OUT THE DETAILS OF HOW TO KILL YOURSELF? DO YOU INTEND TO CARRY OUT THIS PLAN?: NO
5. HAVE YOU STARTED TO WORK OUT OR WORKED OUT THE DETAILS OF HOW TO KILL YOURSELF? DO YOU INTEND TO CARRY OUT THIS PLAN?: NO

## 2024-06-08 ASSESSMENT — PAIN - FUNCTIONAL ASSESSMENT
PAIN_FUNCTIONAL_ASSESSMENT: FLACC (FACE, LEGS, ACTIVITY, CRY, CONSOLABILITY)
PAIN_FUNCTIONAL_ASSESSMENT: 0-10
PAIN_FUNCTIONAL_ASSESSMENT: FLACC (FACE, LEGS, ACTIVITY, CRY, CONSOLABILITY)
PAIN_FUNCTIONAL_ASSESSMENT: 0-10

## 2024-06-08 ASSESSMENT — PAIN DESCRIPTION - ORIENTATION: ORIENTATION: LEFT

## 2024-06-08 ASSESSMENT — ACTIVITIES OF DAILY LIVING (ADL): ADL_ASSISTANCE: INDEPENDENT

## 2024-06-08 ASSESSMENT — COGNITIVE AND FUNCTIONAL STATUS - GENERAL
STANDING UP FROM CHAIR USING ARMS: A LITTLE
CLIMB 3 TO 5 STEPS WITH RAILING: A LOT
WALKING IN HOSPITAL ROOM: A LITTLE
MOBILITY SCORE: 18
MOVING TO AND FROM BED TO CHAIR: A LITTLE
TURNING FROM BACK TO SIDE WHILE IN FLAT BAD: A LITTLE

## 2024-06-08 ASSESSMENT — PAIN SCALES - WONG BAKER: WONGBAKER_NUMERICALRESPONSE: HURTS LITTLE MORE

## 2024-06-08 ASSESSMENT — PAIN DESCRIPTION - DESCRIPTORS: DESCRIPTORS: PRESSURE

## 2024-06-08 NOTE — GROUP NOTE
"Group Topic: Other   Group Date: 6/8/2024  Start Time: 1330  End Time: 1430  Facilitators: RAMÍREZ Hernandez   Department: [facility] REHABTH VIRTUAL    Number of Participants: 7   Group Focus: leisure skills and social skills  Treatment Modality: Leisure Development and Other: Recreation Therapy  Interventions utilized were mental fitness  Purpose: In this group session pts were engaged in a Trivia/Jeopardy game, which aims to improve mental fitness, increase socialization and have fun. This game also focuses on listening skills, following directions and appropriate social interactions.    Name: Nas Jaffe YOB: 1945   MR: [id number]      Facilitator: Recreational Therapist  Level of Participation: did not attend  Progress: None  Comments: pt problem is depressed mood. Pt continues to refuse group this day. Pt states \"I dont like people\".   Plan: continue with services      "

## 2024-06-08 NOTE — GROUP NOTE
"Group Topic: Stress Reduction/Relaxation   Group Date: 6/8/2024  Start Time: 1030  End Time: 1130  Facilitators: RAMÍREZ Hernandez   Department: [facility] REHABTH VIRTUAL    Number of Participants: 7   Group Focus: concentration, mindfulness, and relaxation  Treatment Modality: Other: Recreation Therapy  Interventions utilized were other mindfulness   Purpose: In this therapeutic group patients engaged in mindfulness and relaxation skills including, chair yoga exercise/stretching which is utilized to assist in improving balance, strength, stress and overall mental health. Deep breathing techniques and guided imagery which both aims to promote relaxation, self-awareness and helps to decrease stress and anxiety.     Name: Nas Jaffe YOB: 1945   MR: [id number]      Facilitator: Recreational Therapist  Level of Participation: did not attend  Progress: None  Comments: pt problem is depressed mood. Pt continues to refuse to attend group. Pt states \"I'm dont like groups\". No handout offered.   Plan: continue with services      "

## 2024-06-08 NOTE — CARE PLAN
Problem: PT Problem  Goal: Patient will ambulate with normal gait pattern with walker  Outcome: Progressing  Note: Patient will ambulate with normal gait pattern with walker  Goal: Patient will ascend and descend 1 flight of stairs with rail modified independent  Outcome: Progressing  Note: Patient will ascend and descend 1 flight of stairs with rail modified independent  Goal: Patient will perform supine to sit on bed with independent demonstrating control  Outcome: Progressing  Note: Patient will perform supine to sit on bed with modified independent demonstrating control  Goal: Patient will perform sit to supine on bed with modified independent demonstrating control  Outcome: Progressing  Note: Patient will perform sit to supine on bed with modified independent demonstrating control

## 2024-06-08 NOTE — GROUP NOTE
Group Topic: Goals   Group Date: 6/7/2024  Start Time: 2002  End Time: 2012  Facilitators: Mouna Gama   Department: [facility] Geriatric     Number of Participants: 4   Group Focus: goals  Treatment Modality: Other: PCA  Interventions utilized were group exercise and reminiscence  Purpose: feelings, self-worth, self-care, and relapse prevention strategies    Name: Nas Jaffe YOB: 1945   MR: [id number]      Facilitator: Mental Health [facility]  Level of Participation: did not attend  Quality of Participation:  did not attend  Interactions with others:  did not attend  Mood/Affect:  did not attend  Triggers (if applicable): N/A  Cognition:  did not attend  Progress: Other  Comments: Pt problem is depression. Pt declined the invitation to attend group.  Plan: continue with services

## 2024-06-08 NOTE — CARE PLAN
The patient's goals for the shift include go home    The clinical goals for the shift include Medication compliance/ attend groups/ no falls    Over the shift, the patient did not make progress toward the following goals. Barriers to progression include Noncompliance. Recommendations to address these barriers include encourage compliance.

## 2024-06-08 NOTE — PROGRESS NOTES
Occupational Therapy                 Therapy Communication Note    Patient Name: Nas Jaffe  MRN: [id number]  Today's Date: 6/8/2024     Discipline: Occupational Therapy    Missed Visit Reason: Missed Visit Reason: Other (Comment) (Therapist approaching at 1708 Family present Pt eating dinner)    Missed Time: Attempt

## 2024-06-08 NOTE — NURSING NOTE
Patient signed a 3 day letter. This nurse sat with patient and 2 daughters and thoroughly discussed what the 3 day letter means and all 3 voice understanding. 3 day letter signed on 6/8/2024 at 1653. Dr. Gutierrez notified at 1705.

## 2024-06-08 NOTE — NURSING NOTE
"VAL NOTE     Problem:  SI, depression     Behavior:  Patient is irritable upon approach regarding his breakfast; states \"No one can live off this little bit of food.\" Patient continues to mumble about this facility keeping him here only to get payment from his insurance. Patient denies SI. Patient isolates to his room except for during mealtimes which he attends in the dining room.   Group Participation: Does not attend groups.   Appetite/Meals: 100-100-100       Interventions:  Medications administered per orders. RN offers to call the kitchen for something else for the patient to eat and patient declines but does request another carton of cereal.     Response:  Medication compliant.      Plan:  Continue current plan of care.     "

## 2024-06-08 NOTE — NURSING NOTE
"VAL NOTE     Problem:  depression     Behavior:  Pt isolated himself to his room, did not come out for the snack. Pt appeared asleep in his room, upon awakening complained about pain in his abdomen at the surgical scar area, \"I'm no ok, I want to go home\" statement was made by the Pt, denied current SI, demonstrated low mood, makes needs known, compliant with medication.     Group Participation: no  Appetite/Meals: refused        Interventions:  Scheduled medication administered, assessment performed, unit rules reviewed    Response:  Pt is compliant with medication, agreeable on unit rules, cooperative during assessment     Plan:  Continue monitoring, adhere to care plan  "

## 2024-06-08 NOTE — PROGRESS NOTES
"Nas Jaffe is a 79 y.o. male on day 2 of admission presenting with Severe recurrent major depression without psychotic features (Multi).      Subjective   Chart reviewed and case discussed with nursing.    Nas remains disengaged, withdrawn, refusing to attend groups explained as \"I dont like that tho thing.\"  Is irritable on approach, discharge focused, reporting that psychiatric treatment is \"experimental,\" and that \"I've always been depressed and you aren't going to help it.\"      Collateral from daughter Aishwarya yesterday offering level of concern related to alcohol use, that son is likely enabling, and that patient minimizes use.  On meeting with patient to discuss, has positive CAGE screen, answering yes to need to cut back, that has felt annoyed by discussion about drinking, denies guilt and morning consumption.  Patient then minimizes use reporting none since coming into the hospital, and that \"its only 3-4 days a week at the [facility].\"  Patient reports has previously \"quit\" drinking for \"5-8 years\" but that \"now I'm an old man, I decided to have some beer.\"  Discussed with patient alcohol working as a depressant and patient recognized this could then be contributing to worsened mood lately.  Patient denies having been associated with AA or treatment for alcohol in the past and irritably states, \"I'm not an alcoholic, what are you trying to make me look like?\"    In discussion with patient about gains in hospital absent of unit engagement, patient reports he will know when he's getting better and communicate this with the team.  Thus, despite active discharge requests, recognizes ongoing symptoms.  Is agreeable to resume pristiq today after discussion about adequate trial including using a treatment dose for certain period of time however cites that he will not attend group and notes \"but I worked with physical therapy this morning - what more do you want from me?  Theres nothing to do here.\"  " "Explained to patient has missed multiple groups already this morning by staying in bed.    Patient then explains he was unhappy with breakfast.  In review with staff, patient was offered in realtime a meal replacement and other accommodation regarding breakfast, and he accepted only cheerios refusing a replacement meal.    He does state that his abdomen is less tender today and finds this to be an improvement.           Objective     Last Recorded Vitals  Blood pressure 115/65, pulse 86, temperature 36.6 °C (97.9 °F), temperature source Temporal, resp. rate 19, height 1.727 m (5' 8\"), weight 103 kg (227 lb 4.7 oz), SpO2 93%.    Review of Systems    Psychiatric ROS - Adult  Anxiety: Negative  Depression: anhedonia, appetite decreased, concentration, energy, guilt, helpless, hopeless, interest, persistent thoughts of death, psychomotor retardation, sleep decreased , suicidal thoughts, and withdrawn  Delirium: negative  Psychosis: negative  Lore: negative  Safety Issues: thoughts of harm to others, suicidal ideation, and weapon (gun) in home  Psychiatric ROS Comment: as noted    Physical Exam      Mental Status Exam  General Appearance: Age appearing male wearing personal garments left lateral recumbent in bed, malodorous  Gait/Station: Not observed ambulating  Speech: clear, limited spontaneity  Mood: \"when can I go home?'  Affect: Dysphoric, constricted  and Congruent with mood and topic of conversation  Thought Process:  Logical but simple, superficial  Thought Associations: No loosening of associations  Thought Content: discharge focused, minimizing benefit of hospital stay and impact of contributing risk factors  Perception: No perceptual abnormalities noted  Level of Consciousness: Alert  Orientation: Alert and oriented to person, place, time and situation  Attention and Concentration: Intact  Recent Memory: Intact as evidenced by ability to recall details from the past 24 hours   Remote Memory: Intact as " evidenced by ability to recall previous medical issues   Executive function: Intact  Language: Naming intact  Fund of knowledge: Good  Insight: evolving, as evidenced by recognition of recent suicidality and active depression emerging engagment to accept treatment for this condition, continuous requests for discharge  Judgment: Limited, as evidence by inability to reason through medical decision making, recent high-risk or self-harming behavior , and desire to cause harm to self      Psychiatric Risk Assessment  Violence Risk Assessment: major mental illness, male,  history or weapons training, presence of firearms in home, and other , multiple medical conditions, chronic pain  Acute Risk of Harm to Others is Considered: moderate   Suicide Risk Assessment: access to weapons, age > 65 yrs old, , chronic medical illness, chronic pain, current psychiatric illness, feelings of hopelessness, life crisis (shame/despair), male, presence of firearms in home, suicidal ideations, suicidal plans, and unmarried  Protective Factors against Suicide: other limited  Acute Risk of Harm to Self is Considered: moderate    Relevant Results             Results for orders placed or performed during the hospital encounter of 06/06/24 (from the past 96 hour(s))   Lipid Panel   Result Value Ref Range    Cholesterol 80 (L) 133 - 200 mg/dL    HDL-Cholesterol 31.0 (L) >40.0 mg/dL    Cholesterol/HDL Ratio 2.6 SEE COMMENT    LDL Calculated 27 (L) 65 - 130 mg/dL    Triglycerides 112 40 - 150 mg/dL   Hemoglobin A1C   Result Value Ref Range    Hemoglobin A1C 5.8 (H) See below %    Estimated Average Glucose 120 Not Established mg/dL         Assessment/Plan   Principal Problem:    Severe recurrent major depression without psychotic features (Multi)  Active Problems:    Mood disorder ([facility])    Primary hypertension    Suicidal ideations    Hypoalbuminemia      79-year-old male admitted for suicidality with exacerbated  depression.  Patient had been from the community recently through medical hospitalization for cholecystitis with resulting cholecystectomy.  Patient was then medically discharged to skilled nursing facility for physical rehabilitation.  It had been noted the patient had been making comments related to suicidality with multiple plans and concern for intention at discharge from skilled nursing facility.  Psychiatry services at skilled nursing facility recommended admission and patient transitioned to psychiatric facility for further stabilization.  In meeting with patient today, he endorses past suicidality and ongoing depression symptoms.  Today he aggrees to resume antidepressant. He is discharge focused and appears to be dissimulating for purposes affording sooner discharge.  There is concern his level of insight and failure to appreciate the significance and concerns related to the current scenario.  He is allowing for collateral obtaining from his daughter with whom he lives.  Patient has several risk factors for suicide including age, sex, race, chronic medical conditions, past  history, weapons access, limited insight, patient is , substance use disorder of alcohol.  Referring provider updated on progress thus far and notes that patient elected to terminate services with her on discharge from SNF.     Biological -      Giving consent after discussion about risks, benefits, side effects to  Initiate pristiq 25 mg daily for depression - titrate as tolerated and beneficial to effect, likely 50 mg daily  Pristiq selected as was taking this agent at SNF prior to admission, and remains without adequate trial on this agent which is likely to offer relief from depressive symptoms    Will benefit from further intervention related to alcohol use, brief intervention offered today     2. Psychological -        Patient is encouraged to participate with the therapeutic milieu and program and group therapy         3. Sociological -       Patient encouraged to cooperate with social work staff on issues relevant to discharge planning                    I personally spent 25 minutes today providing care for this patient, including preparation, face to face time, documentation and other services such as review of medical records, diagnostic result, patient education, counseling, coordination of care as specified in the encounter.    Parts of this chart have been completed using voice recognition software.  Please excuse any errors of transcription.  Despite the medical decision making time stamp, my medical decision making has taken place during the patient's entire visit.  Thought process and reason for plan has been formulated from the time that I saw the patient until the time of disposition and is not specific to one specific moment during their visit and furthermore the medical decision making encompasses the entire chart and not only that represented in this note.        Varinder Gutierrez, DO

## 2024-06-08 NOTE — PROGRESS NOTES
Physical Therapy    Physical Therapy Evaluation    Patient Name: Nas Jaffe  MRN: [id number]  Today's Date: 6/8/2024   Time Calculation  Start Time: 0910  Stop Time: 0946  Time Calculation (min): 36 min    Assessment/Plan   PT Assessment  PT Assessment Results: Decreased strength, Decreased range of motion, Decreased endurance, Impaired balance, Decreased mobility, Pain  Rehab Prognosis: Good  Barriers to Discharge: Psych status  Evaluation/Treatment Tolerance: Patient tolerated treatment well  Medical Staff Made Aware: Yes  Strengths: Attitude of self, Premorbid level of function  Barriers to Participation: Comorbidities  End of Session Communication: Bedside nurse, PCT/NA/CTA  Assessment Comment: Pt is steady with ambulation with FWW. Fatigues quickly, R hip pain limits use of cane.  End of Session Patient Position: Up in chair, Alarm off, caregiver present  IP OR SWING BED PT PLAN  Inpatient or Swing Bed: Inpatient  PT Plan  Treatment/Interventions: Bed mobility, Transfer training, Gait training, Stair training, Balance training, Neuromuscular re-education, Strengthening, Endurance training, Range of motion, Therapeutic exercise, Therapeutic activity, Home exercise program  PT Plan: Skilled PT  PT Frequency: 4 times per week  PT Discharge Recommendations: Low intensity level of continued care  Equipment Recommended upon Discharge: Wheeled walker  PT Recommended Transfer Status: Assist x1  PT - OK to Discharge: Yes      Subjective   General Visit Information:  General  Reason for Referral: impaired gait  Referred By: Dr. Gutierrez  Past Medical History Relevant to Rehab: CHF, depression, cholecystitis s/p surgery on 5/19  Family/Caregiver Present: No  Prior to Session Communication: PCT/NA/CTA  Patient Position Received: Bed, 3 rail up  Preferred Learning Style: visual, verbal  General Comment: Cleared by nsg, pt met in supine, agreeable to OT session  Home Living:  Home Living  Type of Home: House  Lives With:  Adult children  Home Adaptive Equipment: Cane, Walker rolling or standard  Home Layout: Multi-level, Stairs to alternate level with rails  Alternate Level Stairs-Number of Steps: full flight to 2nd story or basement for shower/bedroom  Bathroom Shower/Tub: Walk-in shower  Bathroom Equipment: Grab bars in shower, Shower chair with back  Home Living Comments: patient is from [facility] where he is rehabbing after abdominal surgery; planned DC from rehab was scheduled from 6/8 - he typically resides in a multi level home c/ dtr, uses a cane at baseline  Prior Level of Function:  Prior Function Per Pt/Caregiver Report  Level of [place]: Independent with ADLs and functional transfers, Independent with homemaking with ambulation  Receives Help From: Family, Other (Comment)  ADL Assistance: Independent  Ambulatory Assistance: Independent  Prior Function Comments: denies fall hx  Precautions:  Precautions  Hearing/Visual Limitations: + [place], does not wear hearing aides, reports his glasses are not here  Medical Precautions: Abdominal precautions, Fall precautions  Post-Surgical Precautions: Abdominal surgery precautions  Vital Signs:       Objective   Pain:  Pain Assessment  Pain Assessment: 0-10  Pain Score: 4  Pain Type: Chronic pain  Pain Location: Hip  Pain Orientation: Right  Pain Descriptors: Pressure  Pain Frequency: Constant/continuous  Pain Onset: Ongoing  Clinical Progression: Not changed  Patient's Stated Pain Goal: No pain  Pain Interventions: Medication (See MAR)  Response to Interventions: use of walker less painful than cane  Cognition:  Cognition  Overall Cognitive Status: Within Functional Limits  Orientation Level: Oriented X4  Cognition Comments: cooperarive  Attention: Within Functional Limits    General Assessments:                  Activity Tolerance  Endurance: Tolerates 30 min exercise with multiple rests    Strength  Strength Comments: LE strength grossly 4+/5       Dynamic Standing  Balance  Dynamic Standing-Balance Support: Bilateral upper extremity supported  Dynamic Standing-Balance: Turning  Dynamic Standing-Comments: steady, trendelenberg due to R hip pain, longstanding  Functional Assessments:  Bed Mobility  Bed Mobility: Yes  Bed Mobility 1  Bed Mobility 1: Supine to sitting, Sitting to supine  Level of Assistance 1: Close supervision    Transfers  Transfer: Yes  Transfer 1  Transfer From 1: Stand to, Sit to  Transfer to 1: Sit, Stand, Chair with arms, Bed  Technique 1: Sit to stand, Stand to sit  Transfer Device 1: Walker  Transfer Level of Assistance 1: Contact guard    Ambulation/Gait Training  Ambulation/Gait Training Performed: Yes  Ambulation/Gait Training 1  Surface 1: Level tile  Device 1: Rolling walker  Gait Support Devices: Gait belt  Assistance 1: Contact guard  Quality of Gait 1: Wide base of support, Ataxic, Trendelenburg  Comments/Distance (ft) 1: 100 feet x 2, steady  Ambulation/Gait Training 2  Surface 2: Level tile  Device 2: Single point cane  Gait Support Devices: Gait belt  Assistance 2: Minimum assistance  Quality of Gait 2: Trendelenburg, Ataxic  Comments/Distance (ft) 2: 50 feet x 2, mmore effort, increased pain, but steady    Stairs  Stairs: No  Extremity/Trunk Assessments:  RUE   RUE : Within Functional Limits  LUE   LUE: Within Functional Limits  RLE   RLE : Exceptions to WFL  AROM RLE (degrees)  R Hip ABduction 0-45: 25  LLE   LLE : Within Functional Limits  Outcome Measures:  [facility] Basic Mobility  Turning from your back to your side while in a flat bed without using bedrails: None  Moving from lying on your back to sitting on the side of a flat bed without using bedrails: A little  Moving to and from bed to chair (including a wheelchair): A little  Standing up from a chair using your arms (e.g. wheelchair or bedside chair): A little  To walk in hospital room: A little  Climbing 3-5 steps with railing: A lot  Basic Mobility - Total Score: 18    Encounter  Problems       Encounter Problems (Active)       PT Problem       Patient will ambulate with normal gait pattern with walker (Progressing)       Start:  06/08/24    Expected End:  06/15/24         Goal Note       Patient will ambulate with normal gait pattern with walker              Patient will ascend and descend 1 flight of stairs with rail modified independent (Progressing)       Start:  06/08/24    Expected End:  06/15/24         Goal Note       Patient will ascend and descend 1 flight of stairs with rail modified independent              Patient will perform supine to sit on bed with independent demonstrating control (Progressing)       Start:  06/08/24    Expected End:  06/15/24         Goal Note       Patient will perform supine to sit on bed with modified independent demonstrating control              Patient will perform sit to supine on bed with modified independent demonstrating control (Progressing)       Start:  06/08/24    Expected End:  06/15/24         Goal Note       Patient will perform sit to supine on bed with modified independent demonstrating control                     Education Documentation  Body Mechanics, taught by Stephen Cárdenas PT at 6/8/2024 10:14 AM.  Learner: Patient  Readiness: Acceptance  Method: Explanation  Response: Verbalizes Understanding    Mobility Training, taught by Stephen Cárdenas PT at 6/8/2024 10:14 AM.  Learner: Patient  Readiness: Acceptance  Method: Explanation  Response: Verbalizes Understanding    Education Comments  No comments found.

## 2024-06-09 LAB
ATRIAL RATE: 68 BPM
P AXIS: 71 DEGREES
P OFFSET: 195 MS
P ONSET: 145 MS
PR INTERVAL: 158 MS
Q ONSET: 224 MS
QRS COUNT: 11 BEATS
QRS DURATION: 140 MS
QT INTERVAL: 424 MS
QTC CALCULATION(BAZETT): 450 MS
QTC FREDERICIA: 442 MS
R AXIS: -22 DEGREES
T AXIS: 14 DEGREES
T OFFSET: 436 MS
VENTRICULAR RATE: 68 BPM

## 2024-06-09 PROCEDURE — 2500000001 HC RX 250 WO HCPCS SELF ADMINISTERED DRUGS (ALT 637 FOR MEDICARE OP): Performed by: INTERNAL MEDICINE

## 2024-06-09 PROCEDURE — 1140000001 HC PRIVATE PSYCH ROOM DAILY

## 2024-06-09 PROCEDURE — 2500000001 HC RX 250 WO HCPCS SELF ADMINISTERED DRUGS (ALT 637 FOR MEDICARE OP): Performed by: PSYCHIATRY & NEUROLOGY

## 2024-06-09 PROCEDURE — 2500000002 HC RX 250 W HCPCS SELF ADMINISTERED DRUGS (ALT 637 FOR MEDICARE OP, ALT 636 FOR OP/ED): Mod: MUE | Performed by: INTERNAL MEDICINE

## 2024-06-09 PROCEDURE — 99232 SBSQ HOSP IP/OBS MODERATE 35: CPT | Performed by: PSYCHIATRY & NEUROLOGY

## 2024-06-09 RX ADMIN — BUDESONIDE, GLYCOPYRROLATE, AND FORMOTEROL FUMARATE 2 PUFF: 160; 9; 4.8 AEROSOL, METERED RESPIRATORY (INHALATION) at 20:30

## 2024-06-09 RX ADMIN — METOPROLOL TARTRATE 25 MG: 25 TABLET, FILM COATED ORAL at 08:03

## 2024-06-09 RX ADMIN — BUDESONIDE, GLYCOPYRROLATE, AND FORMOTEROL FUMARATE 2 PUFF: 160; 9; 4.8 AEROSOL, METERED RESPIRATORY (INHALATION) at 08:01

## 2024-06-09 RX ADMIN — TRAMADOL HYDROCHLORIDE 50 MG: 50 TABLET ORAL at 20:37

## 2024-06-09 RX ADMIN — POTASSIUM CHLORIDE 20 MEQ: 1500 TABLET, EXTENDED RELEASE ORAL at 08:19

## 2024-06-09 RX ADMIN — LISINOPRIL 10 MG: 10 TABLET ORAL at 08:02

## 2024-06-09 RX ADMIN — TRAMADOL HYDROCHLORIDE 50 MG: 50 TABLET ORAL at 08:13

## 2024-06-09 RX ADMIN — ASPIRIN 81 MG: 81 TABLET, COATED ORAL at 08:02

## 2024-06-09 RX ADMIN — DESVENLAFAXINE SUCCINATE 25 MG: 25 TABLET, EXTENDED RELEASE ORAL at 08:02

## 2024-06-09 ASSESSMENT — PAIN - FUNCTIONAL ASSESSMENT
PAIN_FUNCTIONAL_ASSESSMENT: FLACC (FACE, LEGS, ACTIVITY, CRY, CONSOLABILITY)
PAIN_FUNCTIONAL_ASSESSMENT: 0-10
PAIN_FUNCTIONAL_ASSESSMENT: 0-10
PAIN_FUNCTIONAL_ASSESSMENT: FLACC (FACE, LEGS, ACTIVITY, CRY, CONSOLABILITY)

## 2024-06-09 ASSESSMENT — COLUMBIA-SUICIDE SEVERITY RATING SCALE - C-SSRS
5. HAVE YOU STARTED TO WORK OUT OR WORKED OUT THE DETAILS OF HOW TO KILL YOURSELF? DO YOU INTEND TO CARRY OUT THIS PLAN?: NO
5. HAVE YOU STARTED TO WORK OUT OR WORKED OUT THE DETAILS OF HOW TO KILL YOURSELF? DO YOU INTEND TO CARRY OUT THIS PLAN?: NO
6. HAVE YOU EVER DONE ANYTHING, STARTED TO DO ANYTHING, OR PREPARED TO DO ANYTHING TO END YOUR LIFE?: NO
1. SINCE LAST CONTACT, HAVE YOU WISHED YOU WERE DEAD OR WISHED YOU COULD GO TO SLEEP AND NOT WAKE UP?: NO
2. HAVE YOU ACTUALLY HAD ANY THOUGHTS OF KILLING YOURSELF?: NO
2. HAVE YOU ACTUALLY HAD ANY THOUGHTS OF KILLING YOURSELF?: NO
6. HAVE YOU EVER DONE ANYTHING, STARTED TO DO ANYTHING, OR PREPARED TO DO ANYTHING TO END YOUR LIFE?: NO
1. SINCE LAST CONTACT, HAVE YOU WISHED YOU WERE DEAD OR WISHED YOU COULD GO TO SLEEP AND NOT WAKE UP?: NO

## 2024-06-09 ASSESSMENT — PAIN SCALES - GENERAL
PAINLEVEL_OUTOF10: 0 - NO PAIN
PAINLEVEL_OUTOF10: 6
PAINLEVEL_OUTOF10: 0 - NO PAIN
PAINLEVEL_OUTOF10: 5 - MODERATE PAIN

## 2024-06-09 ASSESSMENT — PAIN DESCRIPTION - DESCRIPTORS: DESCRIPTORS: ACHING

## 2024-06-09 ASSESSMENT — PAIN DESCRIPTION - LOCATION: LOCATION: BACK

## 2024-06-09 NOTE — GROUP NOTE
Group Topic: Excercise/Physical    Group Date: 6/9/2024  Start Time: 1330  End Time: 1420  Facilitators: RAMÍREZ Hernandez   Department: [facility] RE[facility] VIRTUAL    Number of Participants: 8   Group Focus: other physical activity/movement  Treatment Modality: Other: Recreation Therapy  Interventions utilized were group exercise  Purpose: In this group patients were prompted to engage together in a therapeutic game which aims to promote physical activity/movement, increased socialization, motivation, activity level, communication and following directions along utilizing balance and motor skills. CTRS integrated music to this activity to enhance mood and promote memory stimulation.      Name: Nas Jaffe YOB: 1945   MR: [id number]      Facilitator: Recreational Therapist  Level of Participation: active  Quality of Participation: appropriate/pleasant, attentive, cooperative, and engaged  Interactions with others: appropriate  Mood/Affect: depressed and flat  Cognition: coherent/clear  Progress: Moderate  Comments: pt problem is depressed mood. Pt joins group needing little encouragement. PT flat, depressed and responds with negative answers to most questions asked.   Plan: continue with services

## 2024-06-09 NOTE — GROUP NOTE
Group Topic: Goals   Group Date: 6/9/2024  Start Time: 0730  End Time: 0800  Facilitators: Ann Vinson   Department: [facility] Ger[facility]     Number of Participants: 7   Group Focus: goals  Treatment Modality: Behavior Modification Therapy, Cognitive Behavioral Therapy, and Individual Therapy  Interventions utilized were support  Purpose: coping skills, maladaptive thinking, feelings, self-worth, and self-care    Name: Nas Jaffe YOB: 1945   MR: [id number]      Facilitator: Mental Health [facility]  Level of Participation: minimal  Quality of Participation: appropriate/pleasant  Interactions with others: appropriate  Mood/Affect: appropriate  Triggers (if applicable): N/A  Cognition: coherent/clear  Progress: Minimal  Comments: pt problem is depressed mood   Plan: continue with services

## 2024-06-09 NOTE — NURSING NOTE
VAL NOTE     Problem:  SI/ depression      Behavior:  Patient asleep in his bed upon RN arrival to the unit. Patient is irritable upon approach but states he is in pain. Patient is cooperative with care and medication administration. Interacts appropriately with staff. Denies SI.   Group Participation: Attends both am and pm groups  Appetite/Meals: Good       Interventions:  Medication administered per MAR. RN talks with patient about attending groups today and patient agrees to go to at least one group. Patient is encouraged to spend some time out of his room today.     Response:  Medication compliant.      Plan:  Continue current plan of care.

## 2024-06-09 NOTE — GROUP NOTE
"Group Topic: Self-Care/Wellness   Group Date: 6/9/2024  Start Time: 1000  End Time: 1045  Facilitators: RAMÍREZ Hernandez   Department: [facility] REH[facility] VIRTUAL    Number of Participants: 9   Group Focus: other Healthy Living  Treatment Modality: Other: Recreation Therapy  Interventions utilized were mental fitness  Purpose: In this group session pts were engaged in a healthy-living [employer] game. This session was focused on promoting a lifestyle in will help patients increase control over their health and improve quality of life, that also aims to enhance cognition and stimulate socialization.     Name: Nas Jaffe YOB: 1945   MR: [id number]      Facilitator: Recreational Therapist  Level of Participation: minimal  Quality of Participation: passive  Interactions with others:  passive  Mood/Affect: flat  Cognition: not focused  Progress: Minimal  Comments: pt problem is depressed mood. Pt joins group after encouragement from other staff. Pt stays for entirety of session but declines to engage/participate. Pt reports \"I can't really read or hear\".   Plan: continue with services      "

## 2024-06-09 NOTE — PROGRESS NOTES
"Nas Jaffe is a 79 y.o. male on day 3 of admission presenting with Severe recurrent major depression without psychotic features (Multi).      Subjective   Chart reviewed and case discussed with nursing.    Presenting as more optimistic.  Reports he will do \"what ever I have to to be able to get out of here.\"  Patient agrees to attend group this morning.  He notes he already attended goalsetting group during breakfast and found it to be more positive than he thought it would be.  Patient is continuing to take scheduled medications without reported side effect or intolerance.  He reports his mood is better and notes that his suicidality was related to previous abdominal pain which is resolving.  Patient's family came yesterday and during this time patient asked to leave the facility.  He was presented with a 3-day letter which his daughter helped him complete.  Discussed with patient as such, options moving forward would be to either discharge, retract 3-day letter, or proceed with civil commitment through probate court.  Patient reports \"I do not want to get involved in all that I just want to be able to go home.\"  Patient denies further suicidality.  He has been cooperative, more social, and less irritable.  He is more insightful related to psychiatric disturbances and benefit of stabilization.  He is denying thoughts of harming others and remains without evidence of psychosis and aneesh.  He has not been aggressive nor threatening.  He has been attending meals appropriately.  He makes his needs known.  His supports are intact and seeking his discharge when able.    Is reporting some shoulder discomfort this morning due to sleeping position overnight.  No other associated symptoms when asked.    Aware of morning group starting at 10 am and reports intention to attend.           Objective     Last Recorded Vitals  Blood pressure 115/61, pulse 79, temperature 36.8 °C (98.2 °F), temperature source Oral, resp. rate " "18, height 1.727 m (5' 8\"), weight 103 kg (227 lb 4.7 oz), SpO2 95%.    Review of Systems    Psychiatric ROS - Adult  Anxiety: Negative  Depression: anhedonia, appetite decreased, concentration, energy, guilt, helpless, hopeless, interest, persistent thoughts of death, psychomotor retardation, sleep decreased , suicidal thoughts, and withdrawn  Delirium: negative  Psychosis: negative  Lore: negative  Safety Issues: thoughts of harm to others, suicidal ideation, and weapon (gun) in home  Psychiatric ROS Comment: as noted    Physical Exam      Mental Status Exam  General Appearance: Age appearing male wearing personal garments left lateral recumbent in bed, malodorous  Gait/Station: Not observed ambulating  Speech: clear, limited spontaneity  Mood: \"I went to that one group already\"  Affect: less Dysphoric and constricted,  and Congruent with mood and topic of conversation  Thought Process:  Logical but simple, less superficial  Thought Associations: No loosening of associations  Thought Content: remains discharge focused, now recognizing more the benefit of hospital stay and impact of contributing risk factors  Perception: No perceptual abnormalities noted  Level of Consciousness: Alert  Orientation: Alert and oriented to person, place, time and situation  Attention and Concentration: Intact  Recent Memory: Intact as evidenced by ability to recall details from the past 24 hours   Remote Memory: Intact as evidenced by ability to recall previous medical issues   Executive function: Intact  Language: Naming intact  Fund of knowledge: Good  Insight: evolving, as evidenced by recognition of recent suicidality and active depression emerging engagment to accept treatment for this condition, continuous requests for discharge  Judgment: improving, as evidence by inability to reason through medical decision making, recent high-risk or self-harming behavior , and desire to cause harm to self      Psychiatric Risk " Assessment  Violence Risk Assessment: major mental illness, male,  history or weapons training, presence of firearms in home, and other , multiple medical conditions, chronic pain  Acute Risk of Harm to Others is Considered: moderate   Suicide Risk Assessment: access to weapons, age > 65 yrs old, , chronic medical illness, chronic pain, current psychiatric illness, feelings of hopelessness, life crisis (shame/despair), male, presence of firearms in home, suicidal ideations, suicidal plans, and unmarried  Protective Factors against Suicide: other limited  Acute Risk of Harm to Self is Considered: moderate    Relevant Results             Results for orders placed or performed during the hospital encounter of 06/06/24 (from the past 96 hour(s))   Lipid Panel   Result Value Ref Range    Cholesterol 80 (L) 133 - 200 mg/dL    HDL-Cholesterol 31.0 (L) >40.0 mg/dL    Cholesterol/HDL Ratio 2.6 SEE COMMENT    LDL Calculated 27 (L) 65 - 130 mg/dL    Triglycerides 112 40 - 150 mg/dL   Hemoglobin A1C   Result Value Ref Range    Hemoglobin A1C 5.8 (H) See below %    Estimated Average Glucose 120 Not Established mg/dL         Assessment/Plan   Principal Problem:    Severe recurrent major depression without psychotic features (Multi)  Active Problems:    Mood disorder ([facility])    Primary hypertension    Suicidal ideations    Hypoalbuminemia      79-year-old male admitted for suicidality with exacerbated depression.  Patient had been from the community recently through medical hospitalization for cholecystitis with resulting cholecystectomy.  Patient was then medically discharged to skilled nursing facility for physical rehabilitation.  It had been noted the patient had been making comments related to suicidality with multiple plans and concern for intention at discharge from skilled nursing facility.  Psychiatry services at skilled nursing facility recommended admission and patient transitioned to  psychiatric facility for further stabilization.  In meeting with patient today, he endorses past suicidality and ongoing depression symptoms.  Today he aggrees to resume antidepressant. He is discharge focused and appears to be dissimulating for purposes affording sooner discharge.  There is concern his level of insight and failure to appreciate the significance and concerns related to the current scenario.  He is allowing for collateral obtaining from his daughter with whom he lives.  Patient has several risk factors for suicide including age, sex, race, chronic medical conditions, past  history, weapons access, limited insight, patient is , substance use disorder of alcohol.  Referring provider updated on progress thus far and notes that patient elected to terminate services with her on discharge from SNF.    June 9th: more insightful, participatory, more engaged, showing evolution and gains.  Probable discharge tomorrow.     Biological -      Giving consent after discussion about risks, benefits, side effects to  Initiate pristiq 25 mg daily for depression - titrate as tolerated and beneficial to effect, likely 50 mg daily  Pristiq selected as was taking this agent at SNF prior to admission, and remains without adequate trial on this agent which is likely to offer relief from depressive symptoms    Will benefit from further intervention related to alcohol use, brief intervention offered today     2. Psychological -        Patient is encouraged to participate with the therapeutic milieu and program and group therapy        3. Sociological -       Patient encouraged to cooperate with social work staff on issues relevant to discharge planning                    I personally spent 25 minutes today providing care for this patient, including preparation, face to face time, documentation and other services such as review of medical records, diagnostic result, patient education, counseling, coordination of  care as specified in the encounter.    Parts of this chart have been completed using voice recognition software.  Please excuse any errors of transcription.  Despite the medical decision making time stamp, my medical decision making has taken place during the patient's entire visit.  Thought process and reason for plan has been formulated from the time that I saw the patient until the time of disposition and is not specific to one specific moment during their visit and furthermore the medical decision making encompasses the entire chart and not only that represented in this note.        Varinder Gutierrez, DO

## 2024-06-10 VITALS
RESPIRATION RATE: 18 BRPM | TEMPERATURE: 97.5 F | BODY MASS INDEX: 34.45 KG/M2 | OXYGEN SATURATION: 94 % | SYSTOLIC BLOOD PRESSURE: 115 MMHG | DIASTOLIC BLOOD PRESSURE: 59 MMHG | HEART RATE: 92 BPM | HEIGHT: 68 IN | WEIGHT: 227.29 LBS

## 2024-06-10 PROBLEM — R45.851 SUICIDAL IDEATIONS: Status: RESOLVED | Noted: 2024-06-07 | Resolved: 2024-06-10

## 2024-06-10 PROCEDURE — 99239 HOSP IP/OBS DSCHRG MGMT >30: CPT | Performed by: PSYCHIATRY & NEUROLOGY

## 2024-06-10 PROCEDURE — 2500000001 HC RX 250 WO HCPCS SELF ADMINISTERED DRUGS (ALT 637 FOR MEDICARE OP): Performed by: PSYCHIATRY & NEUROLOGY

## 2024-06-10 PROCEDURE — 2500000002 HC RX 250 W HCPCS SELF ADMINISTERED DRUGS (ALT 637 FOR MEDICARE OP, ALT 636 FOR OP/ED): Performed by: INTERNAL MEDICINE

## 2024-06-10 PROCEDURE — 2500000001 HC RX 250 WO HCPCS SELF ADMINISTERED DRUGS (ALT 637 FOR MEDICARE OP): Performed by: INTERNAL MEDICINE

## 2024-06-10 RX ORDER — DESVENLAFAXINE SUCCINATE 25 MG/1
TABLET, EXTENDED RELEASE ORAL
Qty: 60 TABLET | Refills: 0 | Status: SHIPPED | OUTPATIENT
Start: 2024-06-10

## 2024-06-10 RX ORDER — MAGNESIUM CHLORIDE 64 MG
64 TABLET, DELAYED RELEASE (ENTERIC COATED) ORAL DAILY
Qty: 30 TABLET | Refills: 0 | Status: SHIPPED | OUTPATIENT
Start: 2024-06-10

## 2024-06-10 RX ADMIN — DESVENLAFAXINE SUCCINATE 25 MG: 25 TABLET, EXTENDED RELEASE ORAL at 08:17

## 2024-06-10 RX ADMIN — BUDESONIDE, GLYCOPYRROLATE, AND FORMOTEROL FUMARATE 2 PUFF: 160; 9; 4.8 AEROSOL, METERED RESPIRATORY (INHALATION) at 08:17

## 2024-06-10 RX ADMIN — LISINOPRIL 10 MG: 10 TABLET ORAL at 08:17

## 2024-06-10 RX ADMIN — METOPROLOL TARTRATE 25 MG: 25 TABLET, FILM COATED ORAL at 08:17

## 2024-06-10 RX ADMIN — Medication 70 MG: at 09:00

## 2024-06-10 RX ADMIN — POTASSIUM CHLORIDE 20 MEQ: 1500 TABLET, EXTENDED RELEASE ORAL at 08:17

## 2024-06-10 RX ADMIN — ASPIRIN 81 MG: 81 TABLET, COATED ORAL at 08:17

## 2024-06-10 ASSESSMENT — COLUMBIA-SUICIDE SEVERITY RATING SCALE - C-SSRS
2. HAVE YOU ACTUALLY HAD ANY THOUGHTS OF KILLING YOURSELF?: NO
6. HAVE YOU EVER DONE ANYTHING, STARTED TO DO ANYTHING, OR PREPARED TO DO ANYTHING TO END YOUR LIFE?: NO
1. SINCE LAST CONTACT, HAVE YOU WISHED YOU WERE DEAD OR WISHED YOU COULD GO TO SLEEP AND NOT WAKE UP?: NO
5. HAVE YOU STARTED TO WORK OUT OR WORKED OUT THE DETAILS OF HOW TO KILL YOURSELF? DO YOU INTEND TO CARRY OUT THIS PLAN?: NO

## 2024-06-10 ASSESSMENT — PAIN SCALES - GENERAL
PAINLEVEL_OUTOF10: 2
PAINLEVEL_OUTOF10: 2

## 2024-06-10 ASSESSMENT — PAIN - FUNCTIONAL ASSESSMENT: PAIN_FUNCTIONAL_ASSESSMENT: FLACC (FACE, LEGS, ACTIVITY, CRY, CONSOLABILITY)

## 2024-06-10 NOTE — NURSING NOTE
VAL NOTE     Problem:  Depression     Behavior:  Patient is in patient’s room laying in bed sleeping. Upon being woken up by this nurse, patient is mildly irritable and calm. Patient’s affect is blunted. Patient is talkative. Patient maintains good eye contact.    Group Participation: N/A  Appetite/Meals: N/A       Interventions:  This nurse completed a shift assessment and administered patient's scheduled night time medication.    Response:  Patient was mildly irritable and calm at first during this interaction, but then patient became pleasant. Patient was cooperative throughout this shift assessment and medication administration     Plan:  Continue to monitor for depression. Continue to monitor for patient safety.

## 2024-06-10 NOTE — CARE PLAN
The patient's goals for the shift include go home    The clinical goals for the shift include Attend groups/ medication compliance    Over the shift, the patient did make progress toward the following goals. Barriers to progression include some mild minimizations of the seriousness of his words. Recommendations to address these barriers include positive encouragement & acknowledging accountability.      Problem: Daily Care  Goal: Daily care needs are met  Outcome: Progressing     Problem: Safety  Goal: Patient will be injury free during hospitalization  Outcome: Progressing

## 2024-06-10 NOTE — NURSING NOTE
VAL NOTE     Problem:  Pt. Is continuing their journey on working to improve their depressive Sx.     Behavior:  Pt. Is cooperative w/ Tx. Plan and has been agreeable to talking w/ staff & peers alike. Pt. Has been able to maintain safety. Pt. States he's not really supposed to be here & that people blew his statements out of proprtion. Pt. Is cooperative however thus far.  Appetite/Meals: good        Interventions:  Pt. Was offered groups and their scheduled medications.     Response:  Pt. Has been compliant w/ meds, tolerated them well; and did attend the majority of group sessions w/ good results.     Plan:  Pt. will continue to be monitored for changes in mental status & safety on the unit.

## 2024-06-10 NOTE — PROGRESS NOTES
DARIANA spoke with pt who is upset that he is still here and that he is waiting to be discharged. Pt reports that his son-in-law is coming to pick him up. [facility] contacted pt's son-in-law Mehul to coordinate discharge time of 1pm. [facility] well as [facility] health services to pt. Mehul confirmed that there are no guns in the home, that they have been removed. Pt will discharge today with family at 1pm.       Michelle Rodríguez, DARIANA

## 2024-06-10 NOTE — GROUP NOTE
Group Topic: Goals   Group Date: 6/9/2024  Start Time: 2007  End Time: 2028  Facilitators: Mouna Gama   Department: [facility]     Number of Participants: 6   Group Focus: goals  Treatment Modality: Other: PCA  Interventions utilized were group exercise and reminiscence  Purpose: feelings, self-worth, self-care, and relapse prevention strategies    Name: Nas Jaffe YOB: 1945   MR: [id number]      Facilitator: [facility]  Level of Participation: did not attend  Quality of Participation:  did not attend  Interactions with others:  did not attend  Mood/Affect:  did not attend  Triggers (if applicable): N/A  Cognition:  did not attend  Progress: Other  Comments: Pt problem is depression. Pt declined the invitation to attend group.  Plan: continue with services

## 2024-06-10 NOTE — NURSING NOTE
"Discharge Nursing Note    Discharge date: 06/10/24   Discharge time: 1:18 PM      Discharged to:  home    Transportation provided by:  family    Responsible person notified of discharge/transfer?  Include name of person if answering \"YES\"  Yes - family    Patient left with all belongings:  Yes    Patient left with discharge medication list:  Yes    Patient left with discharge instructions:  Yes    AVS/Continuing Care Plan discussed with patient and copy given to either patient or handed to medical transport for discharges to facilities:  Yes    Other concerns at discharge:  none    Presentation on discharge:  stable   "

## 2024-06-10 NOTE — DISCHARGE SUMMARY
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            Hospital Course   Nas Jaffe is a 79 y.o. male presenting with reported exacerbated depression and suicidal ideation.     Chart reviewed including Care Everywhere records, and notes incorporated by reference or direct quotation.     Patient arrived to the [facility] [place] emergency department on June 6 around 120 in the afternoon with report of suicidal ideation the patient was sent by outpatient psychiatric provider due to suicidal statements at the skilled nursing facility.  It is reported in the emergency department documentation the patient has been having thoughts of dying by suicide and that he has several different methods and contemplation to do so.     Patient was seen in the emergency department for medical clearance and deemed to be not in need of other medical services interfering with potential psychiatric admission and referral was made for social work assessment by emergency psychiatric Access team.  Notable includes mild anemia with mild electrolyte imbalance.     Emergency psychiatric Access team then assessed patient who on triage was noted to be high risk as having made suicidal statements to family and providers over the last several weeks with reports of feeling hopeless due to medical issues including pain from recent gallbladder surgery.  It is documented the patient has shared plans to shoot himself or sit in his truck with a running.  It is further noted that patient started psychiatric  "medications via nurse practitioner from [facility] who rounds at the skilled nursing facility and patient has continued to have mood symptoms including suicidality.  In the emergency department patient is quoted as saying \"I retract what I said because I just want to go home.\"     Further charting describes patient as having been a resident of [facility] following recent surgery with anticipated discharge from that facility this weekend and that patient was making suicidal statements to family and providers over the last several weeks describing that he has had a hard life and feels hopeless.  However, patient denying suicidality directly when asked by  during initial assessment reporting that he instead wants to go home.  He previously endorsed that he had multiple plans to complete suicide.  Patient described feeling that he is a burden to his family and has been talking about planning for his afterlife including who would obtain his belongings.  Patient was started on Pristiq with mild improvement while in rehab but continued to voice suicidal thoughts.  Patient did endorse history of suicidality throughout his lifetime during the assessment in the emergency department and further explains this as \"everybody thinks about killing themselves.\"  Patient is noted to be [place] cooperative but with irritable mood.  It was determined the patient would benefit from inpatient psychiatric stabilization and was referred to this facility and thus transferred.     On meeting with patient this morning, he is seated in a wheelchair after having completed the lunch service.  He engages willingly and readily for encounter with this provider stating surprise that he did not think this provider would be meeting with him today.  Patient tells the story that he was recently admitted medically related to cholecystitis with subsequent surgery for this reason and then was discharged medically to rehab to " "improve his ability to walk.  Patient notes that he was \"kidnapped\" after making comments about having suicidal thoughts which he reports was true but is no longer.  He states several times over that \"I would just like to go home.\"  Patient endorses feeling depressed and describes this as being \"down in the dumps.\"  Patient notes he has been this way for 40 years with limited improvement.  Patient reports that he has previously [place] several [place] [facility] [place] but has not formally been treated psychiatrically in the past.  He reports medication is unhelpful and therefore declines additional psychiatric medications and reports would not like to take any psychiatric medications during this admission.  He endorses several features of depression including trouble with sleep, low interest, increased guilt, feelings of hopelessness and helplessness, low moods, psychomotor slowing, trouble with appetite and suicidality.  Despite this, he continues to report that he does not feel he is a risk of harm to himself and that he would like to be able to leave the facility also related to cost of admission.  He is allowing for collateral obtaining from his daughter Aishwarya with whom he lives.  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  Reports desire to punch referring provider in the face.  Denies expressed desire to otherwise harm others.  Denies psychotic features including hallucinosis.  Is not evidencing aneesh.    6/8:  Chart reviewed and case discussed with nursing.     Nas remains disengaged, withdrawn, refusing to attend groups explained as \"I dont like that tho thing.\"  Is irritable on approach, discharge focused, reporting that psychiatric treatment is \"experimental,\" and that \"I've always been depressed and you aren't going to help it.\"     " "  Collateral from daughter Aishwarya yesterday offering level of concern related to alcohol use, that son is likely enabling, and that patient minimizes use.  On meeting with patient to discuss, has positive CAGE screen, answering yes to need to cut back, that has felt annoyed by discussion about drinking, denies guilt and morning consumption.  Patient then minimizes use reporting none since coming into the hospital, and that \"its only 3-4 days a week at the [facility].\"  Patient reports has previously \"quit\" drinking for \"5-8 years\" but that \"now I'm an old man, I decided to have some beer.\"  Discussed with patient alcohol working as a depressant and patient recognized this could then be contributing to worsened mood lately.  Patient denies having been associated with AA or treatment for alcohol in the past and irritably states, \"I'm not an alcoholic, what are you trying to make me look like?\"     In discussion with patient about gains in hospital absent of unit engagement, patient reports he will know when he's getting better and communicate this with the team.  Thus, despite active discharge requests, recognizes ongoing symptoms.  Is agreeable to resume pristiq today after discussion about adequate trial including using a treatment dose for certain period of time however cites that he will not attend group and notes \"but I worked with physical therapy this morning - what more do you want from me?  Theres nothing to do here.\"  Explained to patient has missed multiple groups already this morning by staying in bed.     Patient then explains he was unhappy with breakfast.  In review with staff, patient was offered in realtime a meal replacement and other accommodation regarding breakfast, and he accepted only cheerios refusing a replacement meal.     He does state that his abdomen is less tender today and finds this to be an improvement.    6/9:  Chart reviewed and case discussed with nursing.     Presenting as more " "optimistic.  Reports he will do \"what ever I have to to be able to get out of here.\"  Patient agrees to attend group this morning.  He notes he already attended goalsetting group during breakfast and found it to be more positive than he thought it would be.  Patient is continuing to take scheduled medications without reported side effect or intolerance.  He reports his mood is better and notes that his suicidality was related to previous abdominal pain which is resolving.  Patient's family came yesterday and during this time patient asked to leave the facility.  He was presented with a 3-day letter which his daughter helped him complete.  Discussed with patient as such, options moving forward would be to either discharge, retract 3-day letter, or proceed with civil commitment through probate court.  Patient reports \"I do not want to get involved in all that I just want to be able to go home.\"  Patient denies further suicidality.  He has been cooperative, more social, and less irritable.  He is more insightful related to psychiatric disturbances and benefit of stabilization.  He is denying thoughts of harming others and remains without evidence of psychosis and aneesh.  He has not been aggressive nor threatening.  He has been attending meals appropriately.  He makes his needs known.  His supports are intact and seeking his discharge when able.     Is reporting some shoulder discomfort this morning due to sleeping position overnight.  No other associated symptoms when asked.     Aware of morning group starting at 10 am and reports intention to attend.    6/10 - day of discharge:  Remains with desire for discharge and reports no ideations.  Behavior has remained in control without aggression nor threatening behaviors.  Is completing ADLs, going to meals, and making needs known.  In discussion, patient reports has been honest in his reports with provider, understands that not being honest complicates the risk picture, " "agrees to continue treatment, and feels safe to leave the facility today.  He denies thoughts of harming others, and persists without features of psychosis and aneesh.  While residual depressive features persist, need for acute inpatient psychiatric admission continued may be of limited benefit at this time.  Additionally, patient able to contribute positive and future oriented elements to which he looks forward including improved walking ability, going back to work, spending time with family, and waking daily.  Social work collateral from family indicate they will assist with transition from hospital, link patient with VA services, and have removed firearms and automobile access at home.  Patient did participate in group yesterday and this morning.  Expresses some frustration that thought was going to be leaving earlier today, but accepts plan for son in law to come to hospital around 1pm to accept patient on discharge.  Patient continues to take medications without side effect observed, reported, nor endorsed when asked.  Remains some concern that level of insight related to seriousness of present condition and contribution of alcohol is less than optimal, however reports will engage with treatment on discharge.  Patient further indicates that he is no longer a tobacco user and was not supplied with nicotine replacement at discharge.  Discussed with patient opportunities to address behaviors related to alcohol consumption, and patient continues to report, \"I'm not an alcoholic, and I havent had any since I came into the hospital, so theres nothing else for me to do about it.\"  Encouraged patient to share with outpatient team his recent alcohol use and concerns may have at least contributed to mood symptoms and physical health recently.  Patient in agreement.    Ultimately discussed with patient that continued admission may serve to further stabilize, however that patient has written a 3 day letter that he is " unwilling to retract, that he is nonthreatening suicide nor to others, is maintaining appropriate behavioral control, that his needs can be met in the community in a lesser restrictive setting, that he is not demonstrating behaviors that infringe on the rights of self or others, that family is supportive, engaged, that patient has discharge follow up plan, that patient is taking medications, that patient agrees to continue taking medications adherently at discharge, that patient agrees to reach out for assistance as needed, that a safe discharge environment has been coordinated yields that patient does not meet criteria for civil commitment and that his 3 day letter expiring will result in discharge at this time.      Discharge Admission Goal Reconciliation    Admission goals met during stay include reconciliation of medications, treatment of target symptoms, gathering of collateral supportive of discharge, and linking with appropriate level of care in the community.      At discharge, patient encouraged to participate in activity as tolerated, go to all follow up appointments, take all medications as directed, outreach social supports, and utilize crisis safety resources when appropriate.      Risk Assessment at Discharge  Psychiatric:  Patient's psychiatric condition is of lower risk than on admission given the accumulated and maintained gains as described herein.  Functioning now closer to if not at baseline, and patient is further able to identify appropriate and positive coping skills to manage further or recurrent symptoms.      Physical:  Patients physical condition at discharge is reasonable given ability to complete ADLs.      Social:  Social functioning is improved with patient identifying protective factors of family while also demonstrating increased social interactions on the unit and finally demonstrating appropriate problem solving skills for attending aftercare appointments.      Scheduled  medications - the 2 medications given as prescription at discharge are pristiq and magnesium, others are continued from prior reconciliation.  acetaminophen 325 mg tablet  Commonly known as: Tylenol  Take 1 tablet (325 mg) by mouth every 6 hours if needed.          albuterol 2.5 mg /3 mL (0.083 %) nebulizer solution  Take 3 mL (2.5 mg) by nebulization every 6 hours if needed for wheezing or shortness of breath.         aspirin 81 mg EC tablet  Take 1 tablet (81 mg) by mouth once daily.  Last time this was given: Caroline 10, 2024  8:17 AM         Breztri Aerosphere 160-9-4.8 mcg/actuation HFA aerosol inhaler  Inhale 2 times a day.  Last time this was given: Caroline 10, 2024  8:17 AM  Generic drug: budesonide-glycopyr-formoterol         desvenlafaxine succinate 25 mg 24 hour tablet  Commonly known as: Pristiq  Take 1 tab by mouth once daily for 5 days, then increase to 2 tabs by mouth once daily. Pharmacy may substitute strengths as needed for insurance coverage.  Last time this was given: Caroline 10, 2024  8:17 AM  What changed:  how much to take  how to take this  when to take this  additional instructions         lisinopril 10 mg tablet  Take 1 tablet (10 mg) by mouth once daily.  Last time this was given: Caroline 10, 2024  8:17 AM         magnesium chloride 64 mg EC tablet  Commonly known as: MagDelay  Take 1 tablet (64 mg) by mouth once daily. Do not crush, chew, or split.  Last time this was given: Caroline 10, 2024  9:00 AM         metoprolol tartrate 25 mg tablet  Commonly known as: Lopressor  Take 1 tablet (25 mg) by mouth once daily.  Last time this was given: Caroline 10, 2024  8:17 AM         MILK OF MAGNESIA ORAL  Take by mouth.         potassium chloride CR 20 mEq ER tablet  Commonly known as: Klor-Con M20  Take 1 tablet (20 mEq) by mouth once daily. Do not crush or chew.  Last time this was given: Caroline 10, 2024  8:17 AM         traMADol 50 mg tablet  Commonly known as: Ultram  Take 1 tablet (50 mg) by mouth 2 times a day  "as needed for severe pain (7 - 10) or moderate pain (4 - 6).  Last time this was given: June 9, 2024  8:37 PM                  Pertinent Physical Exam At Time of Discharge  Physical Exam    Mental Status Exam  General Appearance: Age appearing male wearing personal garments, sitting in wheelchair in room have just emerged from toileting  Gait/Station: Not observed ambulating  Speech: clear, conversational volume, rate  Mood: \"I thought I was going to be leaving this morning\"  Affect: less Dysphoric and constricted,  and Congruent with mood and topic of conversation  Thought Process:  Logical but simple, less superficial  Thought Associations: No loosening of associations  Thought Content: remains discharge focused, recognizing more the benefit of hospital stay and impact of contributing risk factors reporting some resolve of acuity, denies thoughts of harming self, others  Perception: No perceptual abnormalities noted  Level of Consciousness: Alert  Orientation: Alert and oriented to person, place, time and situation  Attention and Concentration: Intact  Recent Memory: Intact as evidenced by ability to recall details from the past 24 hours   Remote Memory: Intact as evidenced by ability to recall previous medical issues   Executive function: Intact  Language: Naming intact  Fund of knowledge: Good  Insight: evolving, as evidenced by recognition of recent suicidality and active depression emerging engagment to accept treatment for this condition, continuous requests for discharge  Judgment: improving, as evidence by inability to reason through medical decision making, recent high-risk or self-harming behavior , and desire to cause harm to self          Outpatient Appointments/Follow-Ups    [facility]  [id number] [place] [address]  [phone] [address] [place] [address] [phone]  Go to  Follow up for ongoing PT and other therapy services as well as to establish psychiatric " services for ongoing medication management. Other resources given if needed.      I personally spent 35 minutes today providing care for this patient, including preparation, face to face time, documentation and other services such as review of medical records, diagnostic result, patient education, counseling, coordination of care as specified in the encounter.    Parts of this chart have been completed using voice recognition software.  Please excuse any errors of transcription.  Despite the medical decision making time stamp, my medical decision making has taken place during the patient's entire visit.  Thought process and reason for plan has been formulated from the time that I saw the patient until the time of disposition and is not specific to one specific moment during their visit and furthermore the medical decision making encompasses the entire chart and not only that represented in this note.     Varinder Gutierrez, DO

## 2024-06-10 NOTE — GROUP NOTE
Group Topic: Goals   Group Date: 6/10/2024  Start Time: 0730  End Time: 0800  Facilitators: Tamiko Hartmann   Department: [facility]    Number of Participants: 5   Group Focus: coping skills, daily focus, and goals  Treatment Modality: Other: pca  Interventions utilized were assignment  Purpose: coping skills and communication skills    Name: Nas Jaffe YOB: 1945   MR: [id number]      Facilitator: [facility]  Level of Participation: minimal  Quality of Participation: appropriate/pleasant  Interactions with others: appropriate  Mood/Affect: appropriate  Triggers (if applicable): n/a  Cognition: coherent/clear  Progress: Minimal  Comments: pt problem is depressed mood   Plan: continue with services

## (undated) DEVICE — CARE KIT, LAPAROSCOPIC, ADVANCED

## (undated) DEVICE — ADHESIVE, SKIN, DERMABOND ADVANCED, 15CM, PEN-STYLE

## (undated) DEVICE — SLEEVE, KII, Z-THREAD, 5X100CM

## (undated) DEVICE — Device

## (undated) DEVICE — RETRIEVER, ENDOPOUCH, SPEC BAG

## (undated) DEVICE — CLIP, ENDO APPLIER LIGAMAX 5MM

## (undated) DEVICE — NEEDLE, HYPODERMIC, PROEDGE, 22G X 1.5, BLACK

## (undated) DEVICE — GOWN, SURGICAL, SIRUS, NON REINFORCED, LARGE

## (undated) DEVICE — IRRIGATOR, WOUND, HYDRO SURG PLUS, W/O TIP, DISP

## (undated) DEVICE — NEEDLE, INSUFFLATION, PNEUMOPERITONEUM, ENDOPATH ULTRA VERES, 120 MM

## (undated) DEVICE — SYRINGE, 10 CC, LUER LOCK

## (undated) DEVICE — TROCAR, KII OPTICAL BLADELESS 5MM Z THREAD 100MM LNGTH

## (undated) DEVICE — GLOVE, SURGICAL, PROTEXIS PI BLUE W/NEUTHERA, 7.5, PF, LF

## (undated) DEVICE — SUTURE, MONOCRYL, 4-0, 27 IN, PS-2, UNDYED

## (undated) DEVICE — GLOVE, SURGICAL, PROTEXIS PI , 7.0, PF, LF

## (undated) DEVICE — TUBE SET, PNEUMOCLEAR, SMOKE EVACU, HIGH-FLOW